# Patient Record
Sex: FEMALE | Race: WHITE | NOT HISPANIC OR LATINO | Employment: PART TIME | URBAN - METROPOLITAN AREA
[De-identification: names, ages, dates, MRNs, and addresses within clinical notes are randomized per-mention and may not be internally consistent; named-entity substitution may affect disease eponyms.]

---

## 2017-03-02 ENCOUNTER — GENERIC CONVERSION - ENCOUNTER (OUTPATIENT)
Dept: OTHER | Facility: OTHER | Age: 57
End: 2017-03-02

## 2017-08-07 ENCOUNTER — HOSPITAL ENCOUNTER (OUTPATIENT)
Dept: RADIOLOGY | Facility: HOSPITAL | Age: 57
Discharge: HOME | End: 2017-08-07
Payer: COMMERCIAL

## 2017-08-07 ENCOUNTER — TRANSCRIBE ORDERS (OUTPATIENT)
Dept: ADMINISTRATIVE | Facility: HOSPITAL | Age: 57
End: 2017-08-07

## 2017-08-07 ENCOUNTER — ALLSCRIPTS OFFICE VISIT (OUTPATIENT)
Dept: OTHER | Facility: OTHER | Age: 57
End: 2017-08-07

## 2017-08-07 DIAGNOSIS — M77.41 METATARSALGIA OF RIGHT FOOT: ICD-10-CM

## 2017-08-07 PROCEDURE — 73630 X-RAY EXAM OF FOOT: CPT

## 2017-08-09 ENCOUNTER — GENERIC CONVERSION - ENCOUNTER (OUTPATIENT)
Dept: OTHER | Facility: OTHER | Age: 57
End: 2017-08-09

## 2017-09-19 ENCOUNTER — TRANSCRIBE ORDERS (OUTPATIENT)
Dept: ADMINISTRATIVE | Facility: HOSPITAL | Age: 57
End: 2017-09-19

## 2017-09-19 DIAGNOSIS — Z12.31 ENCOUNTER FOR MAMMOGRAM TO ESTABLISH BASELINE MAMMOGRAM: Primary | ICD-10-CM

## 2017-09-30 ENCOUNTER — ALLSCRIPTS OFFICE VISIT (OUTPATIENT)
Dept: OTHER | Facility: OTHER | Age: 57
End: 2017-09-30

## 2017-10-02 ENCOUNTER — HOSPITAL ENCOUNTER (OUTPATIENT)
Dept: RADIOLOGY | Facility: HOSPITAL | Age: 57
Discharge: HOME | End: 2017-10-02
Payer: COMMERCIAL

## 2017-10-02 ENCOUNTER — TRANSCRIBE ORDERS (OUTPATIENT)
Dept: ADMINISTRATIVE | Facility: HOSPITAL | Age: 57
End: 2017-10-02

## 2017-10-02 DIAGNOSIS — J18.9 PNEUMONIA: ICD-10-CM

## 2017-10-02 DIAGNOSIS — Z12.31 ENCOUNTER FOR SCREENING MAMMOGRAM FOR MALIGNANT NEOPLASM OF BREAST: ICD-10-CM

## 2017-10-02 DIAGNOSIS — Z01.419 ENCOUNTER FOR GYNECOLOGICAL EXAMINATION WITHOUT ABNORMAL FINDING: ICD-10-CM

## 2017-10-02 DIAGNOSIS — J20.9 ACUTE BRONCHITIS: ICD-10-CM

## 2017-10-02 PROCEDURE — 71020 HB CHEST X-RAY 2VW FRONTAL&LATL: CPT

## 2017-10-05 ENCOUNTER — ALLSCRIPTS OFFICE VISIT (OUTPATIENT)
Dept: OTHER | Facility: OTHER | Age: 57
End: 2017-10-05

## 2017-10-06 NOTE — PROGRESS NOTES
Assessment  1  Community acquired pneumonia (5) (J18 9)   2  Current every day smoker (305 1) (F17 200)    Plan  Community acquired pneumonia    · * XR CHEST PA & LATERAL; Status:Active; Requested QVO:[id number];   SocHx: Current every day smoker    · You need to quit smoking ; Status:Complete;   Done: [id number] 02:17PM    Discussion/Summary    She is given a written prescription for Levaquin to have on hand  I have asked her to get a follow-up chest x-ray when she is done the antibiotics for 2 weeks  She is given and no to return to work on the 9th beginning with half days  She is strongly encouraged to begin a tobacco cessation program       Chief Complaint  pt here for recheck on pneumonia  She says she is feeling a little better  Advance Directives  Advance Directive  Luke:   The patient is not in agreement to receive the Advance Care Planning service-   NO - Patient does not have an advance health care directive  Summary of Advance Directive Conversation  Paperwork was given  History of Present Illness  HPI: Patient is here for pneumonia check  She was previously on azithromycin for a sinus infection and then had a choking episode  She then had copious amounts of sputum and developed malaise and fatigue  A chest x-ray demonstrated the presence of an infiltrate  She was switched to Bactrim DS  She is completing a course of steroids and has not required her inhaler  She is feeling much better but has some GI upset from the antibiotics      Review of Systems    Constitutional: No fever, no chills, feels well, no tiredness, no recent weight gain or loss  ENT: no ear ache, no loss of hearing, no nosebleeds or nasal discharge, no sore throat or hoarseness  Cardiovascular: no complaints of slow or fast heart rate, no chest pain, no palpitations, no leg claudication or lower extremity edema  Respiratory: shortness of breath-and-cough     Breasts: no complaints of breast pain, breast lump or nipple discharge  Gastrointestinal: no complaints of abdominal pain, no constipation, no nausea or diarrhea, no vomiting, no bloody stools  Genitourinary: no complaints of dysuria, no incontinence, no pelvic pain, no dysmenorrhea, no vaginal discharge or abnormal vaginal bleeding  Musculoskeletal: no complaints of arthralgia, no myalgia, no joint swelling or stiffness, no limb pain or swelling  Integumentary: no complaints of skin rash or lesion, no itching or dry skin, no skin wounds  Neurological: no complaints of headache, no confusion, no numbness or tingling, no dizziness or fainting  Active Problems  1  Abnormality, skin (757 9) (L98 9)   2  Alopecia (704 00) (L65 9)   3  Asthma (493 90) (J45 909)   4  Bronchitis with bronchospasm (490) (J20 9)   5  Cervical smear, as part of routine gynecological examination (V76 2) (Z01 419)   6  Chronic sinusitis (473 9) (J32 9)   7  Decreased body height (781 91) (R29 890)   8  History of allergy (V15 09) (Z88 9)   9  Hyperlipidemia (272 4) (E78 5)   10  Metatarsalgia of right foot (726 70) (M77 41)   11  History of Need for vaccine for TD (tetanus-diphtheria) (V06 5) (Z23)   12  Osteoarthritis of both hands (715 94) (M19 041,M19 042)   13  Osteoporosis (733 00) (M81 0)   14  Other chronic pain (338 29) (G89 29)   15  Visit for routine gyn exam (V72 31) (Z01 419)   16  Vitamin D deficiency (268 9) (E55 9)    Past Medical History  1  History of Breast self examination education, encounter for (V65 49) (Z71 89)   2  History of Colon cancer screening (V76 51) (Z12 11)   3  History of Encounter for pre-employment examination (V70 5) (Z02 1)   4  History of Encounter for screening for osteoporosis (V82 81) (Z13 820)   5  History of  2 (V22 2) (Z33 1)   6  History of temporomandibular joint disorder (V13 59) (Z87 39)   7  History of Need for influenza vaccination (V04 81) (Z23)   8  History of Need for vaccine for TD (tetanus-diphtheria) (V06 5) (Z23)   9   History of Nephrolithiasis (V13 01)   10  History of Screening for human papillomavirus (HPV) (V73 81) (Z11 51)   11  History of Sessile colonic polyp (211 3) (K63 5)   12  History of Vulvar abscess (616 4) (N76 4)  Active Problems And Past Medical History Reviewed: The active problems and past medical history were reviewed and updated today  Family History  Father    1  Family history of cerebrovascular accident (V17 1) (Z82 3)  Family History Reviewed: The family history was reviewed and updated today  Social History   · Being A Social Drinker   · Denied: History of Caffeine Use   · Current every day smoker (305 1) (F17 200)   · Marital History - Currently    · [address]   · Two children   · Uses Safety Equipment - Seatbelts  The social history was reviewed and updated today  Surgical History  1  History of Appendectomy   2  History of Colonoscopy (Fiberoptic) Screening   3  History of Colposcopy Cervix With Biopsy(S) With Endocervical Curettage   4  History of Jaw Surgery  Surgical History Reviewed: The surgical history was reviewed and updated today  Current Meds   1  Aspirin 325 MG Oral Tablet; Therapy: 89Yip9073 to Recorded   2  Azithromycin 250 MG Oral Tablet; TAKE 2 TABLETS ON DAY 1 THEN TAKE 1 TABLET A   DAY FOR 4 DAYS; Therapy: 77LLH1936 to (Gato Estrada)  Requested for: 71RUP7739; Last   Rx:[id number] Ordered   3  Bactrim -160 MG Oral Tablet; TAKE 1 TABLET TWICE DAILY WITH FOOD; Therapy: 97OHX5418 to (Gato Estrada)  Requested for: 07ZXK3511; Last   Rx:[id number] Ordered   4  Multivitamins Oral Capsule; Therapy: () to Recorded   5  ProAir  (90 Base) MCG/ACT Inhalation Aerosol Solution; INHALE 2 PUFFS   EVERY 4 HOURS AS NEEDED; Therapy: 48RUU0314 to (Last Rx:23Mvj7732)  Requested for: 24MIB9358 Ordered   6  Tums 500 MG Oral Tablet Chewable; Therapy: () to Recorded   7   Vitamin D3 2000 UNIT Oral Tablet Chewable; Therapy: ([phone]) to Recorded    The medication list was reviewed and updated today  Allergies  1  Augmentin XR TB12   2  Avelox TABS   3  Ceclor CAPS   4  Keflex CAPS   5  Doxycycline Monohydrate CAPS   6  Egg/Pro LIQD   7  Vicodin TABS    Vitals   Recorded: 50IYC0706 01:53PM   Temperature 99 2 F   Heart Rate 83   Systolic 546   Diastolic 54   Height 5 ft 0 5 in   Weight 96 lb    BMI Calculated 18 44   BSA Calculated 1 37   O2 Saturation 93     Physical Exam    Constitutional   General appearance: No acute distress, well appearing and well nourished  Eyes   Conjunctiva and lids: No swelling, erythema or discharge  Pupils and irises: Equal, round and reactive to light  Ears, Nose, Mouth, and Throat   External inspection of ears and nose: Normal     Otoscopic examination: Tympanic membranes translucent with normal light reflex  Canals patent without erythema  Nasal mucosa, septum, and turbinates: Normal without edema or erythema  Oropharynx: Normal with no erythema, edema, exudate or lesions  Pulmonary   Respiratory effort: No increased work of breathing or signs of respiratory distress  Auscultation of lungs: Abnormal  -Course breath sounds bilaterally  Cardiovascular   Auscultation of heart: Normal rate and rhythm, normal S1 and S2, without murmurs  Examination of extremities for edema and/or varicosities: Normal     Carotid pulses: Normal     Abdomen   Abdomen: Non-tender, no masses  Liver and spleen: No hepatomegaly or splenomegaly  Lymphatic   Palpation of lymph nodes in neck: No lymphadenopathy  Musculoskeletal   Gait and station: Normal     Digits and nails: Normal without clubbing or cyanosis  Inspection/palpation of joints, bones, and muscles: Normal     Skin   Skin and subcutaneous tissue: Normal without rashes or lesions  Neurologic   Cranial nerves: Cranial nerves 2-12 intact  Reflexes: 2+ and symmetric  Sensation: No sensory loss      Psychiatric   Orientation to person, place, and time: Normal     Mood and affect: Normal          Future Appointments    Date/Time Provider Specialty Site   10/10/2017 02:00 PM Luiz Ramos DO Obstetrics/Gynecology [facility]     Signatures   Electronically signed by : Rj Cleary DO; Oct  5 2017  2:17PM EST                       (Author)

## 2017-10-10 ENCOUNTER — ALLSCRIPTS OFFICE VISIT (OUTPATIENT)
Dept: OTHER | Facility: OTHER | Age: 57
End: 2017-10-10

## 2017-10-18 LAB
ADEQUACY: (HISTORICAL): NORMAL
CLINICIAN PROVIDIED ICD 9 OR 10 (HISTORICAL): NORMAL
COMMENT (HISTORICAL): NORMAL
DIAGNOSIS (HISTORICAL): NORMAL
HPV HIGH RISK (HISTORICAL): NEGATIVE
NOTE: (HISTORICAL): NORMAL
PERFORMED BY (HISTORICAL): NORMAL
TEST INFORMATION (HISTORICAL): NORMAL

## 2017-10-24 ENCOUNTER — HOSPITAL ENCOUNTER (OUTPATIENT)
Dept: MAMMOGRAPHY | Facility: MEDICAL CENTER | Age: 57
Discharge: HOME | End: 2017-10-24
Payer: COMMERCIAL

## 2017-10-24 DIAGNOSIS — Z12.31 ENCOUNTER FOR SCREENING MAMMOGRAM FOR MALIGNANT NEOPLASM OF BREAST: ICD-10-CM

## 2017-10-24 DIAGNOSIS — Z01.419 ENCOUNTER FOR GYNECOLOGICAL EXAMINATION WITHOUT ABNORMAL FINDING: ICD-10-CM

## 2017-10-24 PROCEDURE — G0202 SCR MAMMO BI INCL CAD: HCPCS

## 2017-10-24 PROCEDURE — 77063 BREAST TOMOSYNTHESIS BI: CPT

## 2017-10-26 ENCOUNTER — HOSPITAL ENCOUNTER (OUTPATIENT)
Dept: RADIOLOGY | Facility: HOSPITAL | Age: 57
Discharge: HOME | End: 2017-10-26
Payer: COMMERCIAL

## 2017-10-26 DIAGNOSIS — J18.9 PNEUMONIA: ICD-10-CM

## 2017-10-26 PROCEDURE — 71020 HB CHEST X-RAY 2VW FRONTAL&LATL: CPT

## 2017-10-27 NOTE — PROGRESS NOTES
Assessment  1  Bronchitis with bronchospasm (490) (J20 9)   2  Current every day smoker (305 1) (F17 200)    Plan  Bronchitis with bronchospasm    · ProAir  (90 Base) MCG/ACT Inhalation Aerosol Solution; INHALE 2 PUFFS  EVERY 4 HOURS AS NEEDED   · From  Sulfamethoxazole-Trimethoprim 800-160 MG Oral Tablet Take 1 tablet  twice daily To Bactrim -160 MG Oral Tablet (Sulfamethoxazole-Trimethoprim)  Take 1 tablet twice daily    Discussion/Summary    1) change to bactrim 1 tab twice a day, stop zithromax: note multiple drug allergiesprednisone 10mg 4 tabs for 2 days, 3 tabs for 2 days, 2 tabs for 2 days, 1 tabs for 2 daysproair 2 puffs every 4 hours as needed  Possible side effects of new medications were reviewed with the patient/guardian today  The treatment plan was reviewed with the patient/guardian  The patient/guardian understands and agrees with the treatment plan      Chief Complaint  Pt here with chest congestion, yellowish green mucus that comes and goes  Dr Bo Toney sent in Azithromycin (today would be day #4) but she just feels worse  History of Present Illness  HPI: symptoms started few weeks ago  started with sore throat, went right to chest  coughing with production, thick mucous, yellow and green , feels feverish  in for zithromax for sinus infection and symptoms are not improving  were sick      Review of Systems    Constitutional: feeling tired, but-- as noted in HPI-- and-- no chills  ENT: earache,-- sore throat-- and-- nasal discharge, but-- as noted in HPI,-- no nosebleeds,-- no hearing loss-- and-- no hoarseness  Cardiovascular: no complaints of slow or fast heart rate, no chest pain, no palpitations, no leg claudication or lower extremity edema  Respiratory: shortness of breath,-- cough,-- wheezing-- and-- PND, but-- as noted in HPI,-- no orthopnea-- and-- no shortness of breath during exertion  Breasts: no complaints of breast pain, breast lump or nipple discharge  Gastrointestinal: no complaints of abdominal pain, no constipation, no nausea or diarrhea, no vomiting, no bloody stools  Genitourinary: no complaints of dysuria, no incontinence, no pelvic pain, no dysmenorrhea, no vaginal discharge or abnormal vaginal bleeding  Musculoskeletal: no complaints of arthralgia, no myalgia, no joint swelling or stiffness, no limb pain or swelling  Integumentary: no complaints of skin rash or lesion, no itching or dry skin, no skin wounds  Neurological: headache, but-- as noted in HPI  Active Problems  1  Abnormality, skin (757 9) (L98 9)   2  Alopecia (704 00) (L65 9)   3  Asthma (493 90) (J45 909)   4  Cervical smear, as part of routine gynecological examination (V76 2) (Z01 419)   5  Chronic sinusitis (473 9) (J32 9)   6  Decreased body height (781 91) (R29 890)   7  History of allergy (V15 09) (Z88 9)   8  Hyperlipidemia (272 4) (E78 5)   9  Metatarsalgia of right foot (726 70) (M77 41)   10  History of Need for vaccine for TD (tetanus-diphtheria) (V06 5) (Z23)   11  Osteoarthritis of both hands (715 94) (M19 041,M19 042)   12  Osteoporosis (733 00) (M81 0)   13  Other chronic pain (338 29) (G89 29)   14  Visit for routine gyn exam (V72 31) (Z01 419)   15  Vitamin D deficiency (268 9) (E55 9)    Past Medical History  1  History of Breast self examination education, encounter for (V65 49) (Z71 89)   2  History of Colon cancer screening (V76 51) (Z12 11)   3  History of Encounter for pre-employment examination (V70 5) (Z02 1)   4  History of Encounter for screening for osteoporosis (V82 81) (Z13 820)   5  History of  2 (V22 2) (Z33 1)   6  History of temporomandibular joint disorder (V13 59) (Z87 39)   7  History of Need for influenza vaccination (V04 81) (Z23)   8  History of Need for vaccine for TD (tetanus-diphtheria) (V06 5) (Z23)   9  History of Nephrolithiasis (V13 01)   10  History of Screening for human papillomavirus (HPV) (V73 81) (Z11 51)   11   History of Sessile colonic polyp (211 3) (K63 5)   12  History of Vulvar abscess (616 4) (N76 4)  Active Problems And Past Medical History Reviewed: The active problems and past medical history were reviewed and updated today  Family History  Father    1  Family history of cerebrovascular accident (V17 1) (Z82 3)  Family History Reviewed: The family history was reviewed and updated today  Social History   · Being A Social Drinker   · Denied: History of Caffeine Use   · Current every day smoker (305 1) (F17 200)   · Marital History - Currently    · [address]   · Two children   · Uses Safety Equipment - Seatbelts  The social history was reviewed and updated today  The social history was reviewed and is unchanged  Surgical History  1  History of Appendectomy   2  History of Colonoscopy (Fiberoptic) Screening   3  History of Colposcopy Cervix With Biopsy(S) With Endocervical Curettage   4  History of Jaw Surgery  Surgical History Reviewed: The surgical history was reviewed and updated today  Current Meds   1  Aspirin 325 MG Oral Tablet; Therapy: 50Han5509 to Recorded   2  Azithromycin 250 MG Oral Tablet; TAKE 2 TABLETS ON DAY 1 THEN TAKE 1 TABLET A   DAY FOR 4 DAYS; Therapy: 44BRG9937 to (Chato Jones)  Requested for: 21HZB2605; Last   Rx:[id number] Ordered   3  Multivitamins Oral Capsule; Therapy: () to Recorded   4  Tums 500 MG Oral Tablet Chewable; Therapy: () to Recorded   5  Vitamin D3 2000 UNIT Oral Tablet Chewable; Therapy: () to Recorded    The medication list was reviewed and updated today  Allergies  1  Augmentin XR TB12   2  Avelox TABS   3  Ceclor CAPS   4  Keflex CAPS   5  Doxycycline Monohydrate CAPS   6  Egg/Pro LIQD   7   Vicodin TABS    Vitals   Recorded: 73DUH4894 09:20AM   Temperature 99 7 F   Heart Rate 85   Systolic 587   Diastolic 76   Height 5 ft 0 5 in   Weight 97 lb

## 2017-10-30 ENCOUNTER — GENERIC CONVERSION - ENCOUNTER (OUTPATIENT)
Dept: OTHER | Facility: OTHER | Age: 57
End: 2017-10-30

## 2018-01-13 VITALS
BODY MASS INDEX: 17.65 KG/M2 | DIASTOLIC BLOOD PRESSURE: 62 MMHG | HEIGHT: 61 IN | WEIGHT: 93.5 LBS | SYSTOLIC BLOOD PRESSURE: 114 MMHG

## 2018-01-13 VITALS
SYSTOLIC BLOOD PRESSURE: 116 MMHG | TEMPERATURE: 98 F | WEIGHT: 96 LBS | HEIGHT: 61 IN | DIASTOLIC BLOOD PRESSURE: 72 MMHG | HEART RATE: 68 BPM | OXYGEN SATURATION: 96 % | BODY MASS INDEX: 18.12 KG/M2

## 2018-01-14 VITALS
HEART RATE: 85 BPM | BODY MASS INDEX: 18.31 KG/M2 | TEMPERATURE: 99.7 F | WEIGHT: 97 LBS | SYSTOLIC BLOOD PRESSURE: 122 MMHG | DIASTOLIC BLOOD PRESSURE: 76 MMHG | HEIGHT: 61 IN | OXYGEN SATURATION: 94 %

## 2018-01-15 VITALS
SYSTOLIC BLOOD PRESSURE: 102 MMHG | TEMPERATURE: 99.2 F | HEART RATE: 83 BPM | BODY MASS INDEX: 18.12 KG/M2 | OXYGEN SATURATION: 93 % | WEIGHT: 96 LBS | HEIGHT: 61 IN | DIASTOLIC BLOOD PRESSURE: 54 MMHG

## 2018-01-16 NOTE — RESULT NOTES
Discussion/Summary   chest x-ray is now normal      Verified Results  * XR CHEST PA & LATERAL 26Oct2017 01:45PM Stefanie Bolivar Order Number: [id number]     Test Name Result Flag Reference   XR CHEST PA & LATERAL (Report)     CHEST - DUAL ENERGY     INDICATION: Follow-up right middle lobe pneumonia  COMPARISON: 10/2/2017 at 12:06 AM      VIEWS: PA (including soft tissue/bone algorithms) and lateral projections     IMAGES: 4     FINDINGS:        Cardiomediastinal silhouette appears unremarkable  There is improved aeration within the right middle lobe  No new focal consolidations, pleural effusions, or pneumothorax  Visualized osseous structures appear within normal limits for the patient's age  IMPRESSION:     Improved aeration within the right middle lobe  No focal consolidations, pleural effusions, or pneumothorax         Workstation performed: [id number]     Signed by:   Mark Perdue MD   10/30/17

## 2018-01-16 NOTE — RESULT NOTES
Discussion/Summary   xray is negative for fracture     Verified Results  * XR FOOT 3+ VIEW RIGHT 05Rrh2547 10:36AM Stefanie Bolivar Order Number: [id number]     Test Name Result Flag Reference   XR FOOT 3+ VW RIGHT (Report)     RIGHT FOOT     INDICATION: Right foot pain  COMPARISON: None     VIEWS: 3     IMAGES: 3     FINDINGS:     There is no acute fracture or dislocation  Bipartite tibial sesamoid noted  No degenerative changes  No lytic or blastic lesions are seen  Soft tissues are unremarkable  IMPRESSION:     No acute osseous abnormality         Workstation performed: [id number]     Signed by:   Collin Doll MD   8/9/17

## 2018-01-25 ENCOUNTER — TELEPHONE (OUTPATIENT)
Dept: FAMILY MEDICINE CLINIC | Facility: CLINIC | Age: 58
End: 2018-01-25

## 2018-01-25 DIAGNOSIS — J01.01 ACUTE RECURRENT MAXILLARY SINUSITIS: ICD-10-CM

## 2018-01-25 DIAGNOSIS — J01.01 ACUTE RECURRENT MAXILLARY SINUSITIS: Primary | ICD-10-CM

## 2018-01-25 RX ORDER — AZITHROMYCIN 250 MG/1
250 TABLET, FILM COATED ORAL DAILY
Qty: 6 TABLET | Refills: 0 | Status: SHIPPED | OUTPATIENT
Start: 2018-01-25 | End: 2018-01-25 | Stop reason: SDUPTHER

## 2018-01-26 RX ORDER — AZITHROMYCIN 250 MG/1
TABLET, FILM COATED ORAL
Qty: 6 TABLET | Refills: 0 | Status: SHIPPED | OUTPATIENT
Start: 2018-01-26 | End: 2018-01-31

## 2018-02-10 ENCOUNTER — OFFICE VISIT (OUTPATIENT)
Dept: FAMILY MEDICINE CLINIC | Facility: CLINIC | Age: 58
End: 2018-02-10
Payer: COMMERCIAL

## 2018-02-10 VITALS
DIASTOLIC BLOOD PRESSURE: 72 MMHG | HEIGHT: 64 IN | SYSTOLIC BLOOD PRESSURE: 128 MMHG | TEMPERATURE: 99 F | HEART RATE: 72 BPM | BODY MASS INDEX: 17.54 KG/M2 | WEIGHT: 102.75 LBS | OXYGEN SATURATION: 98 %

## 2018-02-10 DIAGNOSIS — J32.0 CHRONIC MAXILLARY SINUSITIS: Primary | ICD-10-CM

## 2018-02-10 PROCEDURE — 99213 OFFICE O/P EST LOW 20 MIN: CPT | Performed by: FAMILY MEDICINE

## 2018-02-10 RX ORDER — PREDNISONE 10 MG/1
TABLET ORAL
Qty: 30 TABLET | Refills: 0 | Status: SHIPPED | COMMUNITY
Start: 2018-02-10 | End: 2018-04-09

## 2018-02-10 RX ORDER — ASPIRIN 325 MG
325 TABLET ORAL DAILY
COMMUNITY

## 2018-02-10 RX ORDER — DOXYCYCLINE 100 MG/1
100 CAPSULE ORAL 2 TIMES DAILY
Qty: 20 CAPSULE | Refills: 0 | Status: SHIPPED | OUTPATIENT
Start: 2018-02-10 | End: 2018-02-20

## 2018-02-10 RX ORDER — MULTIVITAMIN
1 TABLET ORAL DAILY
COMMUNITY

## 2018-02-10 NOTE — PATIENT INSTRUCTIONS
Rhinosinusitis   WHAT YOU NEED TO KNOW:   Rhinosinusitis (RS) is inflammation of your nose and sinuses  It commonly begins as a virus, often as a common cold  Viruses usually last 7 to 10 days and do not need treatment  When the virus does not get better on its own, you may have bacterial RS  This means that bacteria have begun to grow inside your sinuses  Acute RS lasts less than 4 weeks  Chronic RS lasts 12 weeks or more  Recurrent RS is when you have 4 or more episodes of RS in one year  DISCHARGE INSTRUCTIONS:   Return to the emergency department if:   · Your eye and eyelid are red, swollen, and painful  · You cannot open your eye  · You have double vision or you cannot see  · Your eyeball bulges out or you cannot move your eye  · You are more sleepy than normal or you notice changes in your ability to think, move, or talk  · You have a stiff neck, a fever, or a bad headache  · You have swelling of your forehead or scalp  Contact your healthcare provider if:   · Your symptoms are worse or do not improve after 3 to 5 days of treatment  · You have questions or concerns about your condition or care  Medicines: You may need any of the following:  · Acetaminophen  decreases pain and fever  It is available without a doctor's order  Ask how much to take and how often to take it  Follow directions  Acetaminophen can cause liver damage if not taken correctly  · NSAIDs , such as ibuprofen, help decrease swelling, pain, and fever  This medicine is available with or without a doctor's order  NSAIDs can cause stomach bleeding or kidney problems in certain people  If you take blood thinner medicine, always ask your healthcare provider if NSAIDs are safe for you  Always read the medicine label and follow directions  · Nasal steroid sprays  decrease inflammation in your nose and sinuses  · Decongestants  reduce swelling and drain mucus in the nose and sinuses   They may help you breathe easier  · Antihistamines  dry mucus in the nose and relieve sneezing  · Antibiotics  treat a bacterial infection and may be needed if your symptoms do not improve or they get worse  · Take your medicine as directed  Contact your healthcare provider if you think your medicine is not helping or if you have side effects  Tell him or her if you are allergic to any medicine  Keep a list of the medicines, vitamins, and herbs you take  Include the amounts, and when and why you take them  Bring the list or the pill bottles to follow-up visits  Carry your medicine list with you in case of an emergency  Self-care:   · Rinse your sinuses  Use a sinus rinse device to rinse your nasal passages with a saline (salt water) solution  This will help thin the mucus in your nose and rinse away pollen and dirt  It will also help reduce swelling so you can breathe normally  Ask your healthcare provider how often to do this  · Breathe in steam   Heat a bowl of water until you see steam  Lean over the bowl and make a tent over your head with a large towel  Breathe deeply for about 20 minutes  Be careful not to get too close to the steam or burn yourself  Do this 3 times a day  You can also breathe deeply when you take a hot shower  · Sleep with your head elevated  Place an extra pillow under your head before you go to sleep to help your sinuses drain  · Drink liquids as directed  Ask your healthcare provider how much liquid to drink each day and which liquids are best for you  Liquids will thin the mucus in your nose and help it drain  Avoid drinks that contain alcohol or caffeine  · Do not smoke, and avoid secondhand smoke  Nicotine and other chemicals in cigarettes and cigars can make your symptoms worse  Ask your healthcare provider for information if you currently smoke and need help to quit  E-cigarettes or smokeless tobacco still contain nicotine   Talk to your healthcare provider before you use these products  Follow up with your healthcare provider as directed: Follow up if your symptoms are worse or not better after 3 to 5 days of treatment  Write down your questions so you remember to ask them during your visits  © 2017 [employer] Information is for End User's use only and may not be sold, redistributed or otherwise used for commercial purposes  All illustrations and images included in CareNotes® are the copyrighted property of A D [employer] M [employer] Inc  or [employer]  The above information is an  only  It is not intended as medical advice for individual conditions or treatments  Talk to your doctor, nurse or pharmacist before following any medical regimen to see if it is safe and effective for you

## 2018-02-15 ENCOUNTER — TELEPHONE (OUTPATIENT)
Dept: FAMILY MEDICINE CLINIC | Facility: CLINIC | Age: 58
End: 2018-02-15

## 2018-02-15 ENCOUNTER — TRANSCRIBE ORDERS (OUTPATIENT)
Dept: FAMILY MEDICINE CLINIC | Facility: CLINIC | Age: 58
End: 2018-02-15

## 2018-02-15 NOTE — TELEPHONE ENCOUNTER
Seen Saturday for ear infection  Still feels like there is fluid in her ear and it's already franky five days  She still has medication to finish but wants to know if there an ENT you can refer her to the one previously mentioned at her appt does not take her insurance

## 2018-02-16 ENCOUNTER — HOSPITAL ENCOUNTER (OUTPATIENT)
Dept: RADIOLOGY | Facility: HOSPITAL | Age: 58
Discharge: HOME | End: 2018-02-16
Attending: FAMILY MEDICINE
Payer: COMMERCIAL

## 2018-02-16 ENCOUNTER — TELEPHONE (OUTPATIENT)
Dept: FAMILY MEDICINE CLINIC | Facility: CLINIC | Age: 58
End: 2018-02-16

## 2018-02-16 DIAGNOSIS — J34.89 SINUS PAIN: ICD-10-CM

## 2018-02-16 DIAGNOSIS — H92.09 OTALGIA, UNSPECIFIED LATERALITY: ICD-10-CM

## 2018-02-16 DIAGNOSIS — H92.09 OTALGIA, UNSPECIFIED LATERALITY: Primary | ICD-10-CM

## 2018-02-16 PROCEDURE — 70220 X-RAY EXAM OF SINUSES: CPT

## 2018-02-16 NOTE — TELEPHONE ENCOUNTER
WILL YOU PLEASE ORDER A SINUS X-RAY, PLEASE SEE PREVIOUS PHONE MESSAGE FOR ALEX    I NEED TO LET PT KNOW AS SOON A IT IS ORDERED

## 2018-02-19 DIAGNOSIS — J01.01 ACUTE RECURRENT MAXILLARY SINUSITIS: Primary | ICD-10-CM

## 2018-04-09 ENCOUNTER — OFFICE VISIT (OUTPATIENT)
Dept: FAMILY MEDICINE CLINIC | Facility: CLINIC | Age: 58
End: 2018-04-09
Payer: COMMERCIAL

## 2018-04-09 VITALS
HEIGHT: 64 IN | RESPIRATION RATE: 12 BRPM | WEIGHT: 99.25 LBS | SYSTOLIC BLOOD PRESSURE: 102 MMHG | DIASTOLIC BLOOD PRESSURE: 70 MMHG | HEART RATE: 73 BPM | BODY MASS INDEX: 16.94 KG/M2 | OXYGEN SATURATION: 97 % | TEMPERATURE: 98.4 F

## 2018-04-09 DIAGNOSIS — J32.8 OTHER CHRONIC SINUSITIS: ICD-10-CM

## 2018-04-09 DIAGNOSIS — J01.01 ACUTE RECURRENT MAXILLARY SINUSITIS: Primary | ICD-10-CM

## 2018-04-09 PROBLEM — M77.41 METATARSALGIA OF RIGHT FOOT: Status: ACTIVE | Noted: 2017-08-07

## 2018-04-09 PROCEDURE — 99214 OFFICE O/P EST MOD 30 MIN: CPT | Performed by: FAMILY MEDICINE

## 2018-04-09 RX ORDER — DOXYCYCLINE HYCLATE 100 MG/1
100 CAPSULE ORAL EVERY 12 HOURS SCHEDULED
Qty: 20 CAPSULE | Refills: 0 | Status: SHIPPED | OUTPATIENT
Start: 2018-04-09 | End: 2022-05-19 | Stop reason: SDUPTHER

## 2018-04-09 RX ORDER — MELATONIN
1000 DAILY
COMMUNITY

## 2018-04-09 RX ORDER — PREDNISONE 10 MG/1
TABLET ORAL
Qty: 20 TABLET | Refills: 0
Start: 2018-04-09 | End: 2018-05-24 | Stop reason: SDUPTHER

## 2018-04-09 NOTE — PROGRESS NOTES
Assessment/Plan:    No problem-specific Assessment & Plan notes found for this encounter  Diagnoses and all orders for this visit:    Acute recurrent maxillary sinusitis  Other chronic sinusitis  -     doxycycline hyclate (VIBRAMYCIN) 100 mg capsule; Take 1 capsule (100 mg total) by mouth every 12 (twelve) hours for 10 days  -     predniSONE 10 mg tablet; 4 tabs daily x2 days, 3 tabs daily x2 days, 2 tabs daily x 2 days, 1 tab daily x2 days  -     XR chest pa & lateral; Future    I suspect that the patient has a bacterial sinusitis  I prescribed antibiotics and encouraged medication for sx relief  Rest and fluids encouraged as well  Given her chronic sinusitis she does often get a steroid when she has acute sinusitis and she does feel she needs this  Additionally, she had pneumonia last October and she is very concerned about developing pneumonia again  Her lungs sound clear today but I did prescribe an x-ray for her-if she is getting worse, especially if she develops fevers and shortness of breath, she can go over to the hospital and get the x-ray done  Subjective:      Patient ID: Tari Rowe is a 60 y o  female  The pt is here because she has been sick for 5 days  + sinus pain/pressure  + ear pain and pressure  + cough  + nasal congestion - has dark green phlegm   + headaches  + PND  + sore throat  No fevers    Took a bactrim this morning          The following portions of the patient's history were reviewed and updated as appropriate: allergies, current medications, past family history, past medical history, past social history, past surgical history and problem list     Review of Systems      Objective:  Vitals:    04/09/18 0939   BP: 102/70   Pulse: 73   Resp: 12   Temp: 98 4 °F (36 9 °C)   SpO2: 97%   Weight: 45 kg (99 lb 4 oz)   Height: 5' 4" (1 626 m)      Physical Exam   Constitutional: She is oriented to person, place, and time   Vital signs are normal  She appears well-developed and well-nourished  She appears ill  HENT:   Head: Normocephalic and atraumatic  Right Ear: External ear normal  Tympanic membrane is bulging  Tympanic membrane is not erythematous  Left Ear: External ear normal  Tympanic membrane is bulging  Tympanic membrane is not erythematous  Nose: Mucosal edema and sinus tenderness present  No rhinorrhea  Right sinus exhibits maxillary sinus tenderness  Right sinus exhibits no frontal sinus tenderness  Left sinus exhibits maxillary sinus tenderness  Left sinus exhibits no frontal sinus tenderness  Mouth/Throat: Mucous membranes are normal  Posterior oropharyngeal erythema present  No oropharyngeal exudate, posterior oropharyngeal edema or tonsillar abscesses  Eyes: Conjunctivae and lids are normal    Pulmonary/Chest: Effort normal and breath sounds normal    Lymphadenopathy:     She has cervical adenopathy  Neurological: She is alert and oriented to person, place, and time  Skin: Skin is warm, dry and intact  Psychiatric: She has a normal mood and affect   Thought content normal

## 2018-05-24 ENCOUNTER — OFFICE VISIT (OUTPATIENT)
Dept: FAMILY MEDICINE CLINIC | Facility: CLINIC | Age: 58
End: 2018-05-24
Payer: COMMERCIAL

## 2018-05-24 VITALS
HEIGHT: 64 IN | HEART RATE: 86 BPM | DIASTOLIC BLOOD PRESSURE: 62 MMHG | OXYGEN SATURATION: 97 % | WEIGHT: 98 LBS | TEMPERATURE: 98.8 F | SYSTOLIC BLOOD PRESSURE: 114 MMHG | BODY MASS INDEX: 16.73 KG/M2

## 2018-05-24 DIAGNOSIS — J32.8 OTHER CHRONIC SINUSITIS: ICD-10-CM

## 2018-05-24 DIAGNOSIS — J01.01 ACUTE RECURRENT MAXILLARY SINUSITIS: ICD-10-CM

## 2018-05-24 DIAGNOSIS — J01.00 ACUTE NON-RECURRENT MAXILLARY SINUSITIS: Primary | ICD-10-CM

## 2018-05-24 PROCEDURE — 3008F BODY MASS INDEX DOCD: CPT | Performed by: FAMILY MEDICINE

## 2018-05-24 PROCEDURE — 99214 OFFICE O/P EST MOD 30 MIN: CPT | Performed by: FAMILY MEDICINE

## 2018-05-24 RX ORDER — DOXYCYCLINE HYCLATE 100 MG/1
100 CAPSULE ORAL EVERY 12 HOURS SCHEDULED
Qty: 20 CAPSULE | Refills: 0 | Status: SHIPPED | OUTPATIENT
Start: 2018-05-24 | End: 2018-06-03

## 2018-05-24 RX ORDER — PREDNISONE 10 MG/1
TABLET ORAL
Qty: 20 TABLET | Refills: 0
Start: 2018-05-24 | End: 2018-10-02

## 2018-05-24 NOTE — PROGRESS NOTES
Assessment/Plan:    No problem-specific Assessment & Plan notes found for this encounter  Diagnoses and all orders for this visit:    Acute non-recurrent maxillary sinusitis  -     doxycycline hyclate (VIBRAMYCIN) 100 mg capsule; Take 1 capsule (100 mg total) by mouth every 12 (twelve) hours for 10 days    Acute recurrent maxillary sinusitis  -     predniSONE 10 mg tablet; 4 tabs daily x2 days, 3 tabs daily x2 days, 2 tabs daily x 2 days, 1 tab daily x2 days    Other chronic sinusitis  -     predniSONE 10 mg tablet; 4 tabs daily x2 days, 3 tabs daily x2 days, 2 tabs daily x 2 days, 1 tab daily x2 days          I suspect that the patient may have a bacterial sinusitis  I prescribed antibiotics and encouraged medication for sx relief  Rest and fluids encouraged as well  I did also prescribe a steroid  She is actually not going to take anything as of yet-she feels like it might be getting better but with the holiday weekend coming up she would prefer to have something on hand  She was just here in April with similar symptoms and was treated with an antibiotic and a steroid at that time  If she does not abusing the medication and this recurs again it would be worth a visit to the ENT  She is using her Rhinocort and I encouraged she continue this, she can also get nasal saline  Subjective:      Patient ID: Amando Barton is a 60 y o  female      The pt is here because she has been sick for 7 days, at least  + sinus pain/pressure  + ear pain and pressure - they feel full  Changes one side to the other, swollen glands  + cough, a lot in the am  + nasal congestion  + headaches  + PND  + sore throat - comes and goes   Green mucous  No fevers    She did have an x-ray of her sinuses in February of 2018  The report showed no sinusitis but a history of sinus surgery-the patient reports she has never had sinus surgery        The following portions of the patient's history were reviewed and updated as appropriate: allergies, current medications, past family history, past medical history, past social history, past surgical history and problem list     Review of Systems      Objective:  Vitals:    05/24/18 0918   BP: 114/62   Pulse: 86   Temp: 98 8 °F (37 1 °C)   SpO2: 97%   Weight: 44 5 kg (98 lb)   Height: 5' 4" (1 626 m)      Physical Exam   Constitutional: She is oriented to person, place, and time  Vital signs are normal  She appears well-developed and well-nourished  She appears ill  HENT:   Head: Normocephalic and atraumatic  Right Ear: Tympanic membrane and external ear normal    Left Ear: Tympanic membrane and external ear normal    Nose: Mucosal edema and sinus tenderness present  No rhinorrhea  Right sinus exhibits maxillary sinus tenderness and frontal sinus tenderness  Left sinus exhibits maxillary sinus tenderness and frontal sinus tenderness  Mouth/Throat: Mucous membranes are normal  Posterior oropharyngeal erythema present  No oropharyngeal exudate, posterior oropharyngeal edema or tonsillar abscesses  Eyes: Conjunctivae and lids are normal    Pulmonary/Chest: Effort normal and breath sounds normal    Lymphadenopathy:     She has cervical adenopathy  Neurological: She is alert and oriented to person, place, and time  Skin: Skin is warm, dry and intact  Psychiatric: She has a normal mood and affect   Thought content normal

## 2018-05-24 NOTE — PATIENT INSTRUCTIONS
Rhinosinusitis   WHAT YOU NEED TO KNOW:   Rhinosinusitis (RS) is inflammation of your nose and sinuses  It commonly begins as a virus, often as a common cold  Viruses usually last 7 to 10 days and do not need treatment  When the virus does not get better on its own, you may have bacterial RS  This means that bacteria have begun to grow inside your sinuses  Acute RS lasts less than 4 weeks  Chronic RS lasts 12 weeks or more  Recurrent RS is when you have 4 or more episodes of RS in one year  DISCHARGE INSTRUCTIONS:   Return to the emergency department if:   · Your eye and eyelid are red, swollen, and painful  · You cannot open your eye  · You have double vision or you cannot see  · Your eyeball bulges out or you cannot move your eye  · You are more sleepy than normal or you notice changes in your ability to think, move, or talk  · You have a stiff neck, a fever, or a bad headache  · You have swelling of your forehead or scalp  Contact your healthcare provider if:   · Your symptoms are worse or do not improve after 3 to 5 days of treatment  · You have questions or concerns about your condition or care  Medicines: You may need any of the following:  · Acetaminophen  decreases pain and fever  It is available without a doctor's order  Ask how much to take and how often to take it  Follow directions  Acetaminophen can cause liver damage if not taken correctly  · NSAIDs , such as ibuprofen, help decrease swelling, pain, and fever  This medicine is available with or without a doctor's order  NSAIDs can cause stomach bleeding or kidney problems in certain people  If you take blood thinner medicine, always ask your healthcare provider if NSAIDs are safe for you  Always read the medicine label and follow directions  · Nasal steroid sprays  decrease inflammation in your nose and sinuses  · Decongestants  reduce swelling and drain mucus in the nose and sinuses   They may help you breathe easier  · Antihistamines  dry mucus in the nose and relieve sneezing  · Antibiotics  treat a bacterial infection and may be needed if your symptoms do not improve or they get worse  · Take your medicine as directed  Contact your healthcare provider if you think your medicine is not helping or if you have side effects  Tell him or her if you are allergic to any medicine  Keep a list of the medicines, vitamins, and herbs you take  Include the amounts, and when and why you take them  Bring the list or the pill bottles to follow-up visits  Carry your medicine list with you in case of an emergency  Self-care:   · Rinse your sinuses  Use a sinus rinse device to rinse your nasal passages with a saline (salt water) solution  This will help thin the mucus in your nose and rinse away pollen and dirt  It will also help reduce swelling so you can breathe normally  Ask your healthcare provider how often to do this  · Breathe in steam   Heat a bowl of water until you see steam  Lean over the bowl and make a tent over your head with a large towel  Breathe deeply for about 20 minutes  Be careful not to get too close to the steam or burn yourself  Do this 3 times a day  You can also breathe deeply when you take a hot shower  · Sleep with your head elevated  Place an extra pillow under your head before you go to sleep to help your sinuses drain  · Drink liquids as directed  Ask your healthcare provider how much liquid to drink each day and which liquids are best for you  Liquids will thin the mucus in your nose and help it drain  Avoid drinks that contain alcohol or caffeine  · Do not smoke, and avoid secondhand smoke  Nicotine and other chemicals in cigarettes and cigars can make your symptoms worse  Ask your healthcare provider for information if you currently smoke and need help to quit  E-cigarettes or smokeless tobacco still contain nicotine   Talk to your healthcare provider before you use these products  Follow up with your healthcare provider as directed: Follow up if your symptoms are worse or not better after 3 to 5 days of treatment  Write down your questions so you remember to ask them during your visits  © 2017 [address] Information is for End User's use only and may not be sold, redistributed or otherwise used for commercial purposes  All illustrations and images included in CareNotes® are the copyrighted property of A D A M , [employer]  or [address]  The above information is an  only  It is not intended as medical advice for individual conditions or treatments  Talk to your doctor, nurse or pharmacist before following any medical regimen to see if it is safe and effective for you

## 2018-08-22 ENCOUNTER — TELEPHONE (OUTPATIENT)
Dept: FAMILY MEDICINE CLINIC | Facility: CLINIC | Age: 58
End: 2018-08-22

## 2018-08-22 DIAGNOSIS — E78.2 MIXED HYPERLIPIDEMIA: Primary | ICD-10-CM

## 2018-08-22 DIAGNOSIS — L65.9 ALOPECIA: ICD-10-CM

## 2018-08-22 DIAGNOSIS — E55.9 VITAMIN D DEFICIENCY: ICD-10-CM

## 2018-08-23 ENCOUNTER — TELEPHONE (OUTPATIENT)
Dept: FAMILY MEDICINE CLINIC | Facility: CLINIC | Age: 58
End: 2018-08-23

## 2018-08-23 DIAGNOSIS — E78.2 MIXED HYPERLIPIDEMIA: Primary | ICD-10-CM

## 2018-08-23 DIAGNOSIS — E55.9 VITAMIN D DEFICIENCY: ICD-10-CM

## 2018-08-23 NOTE — TELEPHONE ENCOUNTER
YOU PUT LABS IN FOR PATIENT YESTERDAY FOR [facility]  HER INSURANCE HAS CHANGED AND [facility] WILL [facility]ING [facility]  CAN YOU PLEASE CHANGE THAT TO [facility] PLEASE? THANK YOU!

## 2018-08-31 LAB
25(OH)D3 SERPL-MCNC: 84 NG/ML (ref 30–100)
ALBUMIN SERPL-MCNC: 4 G/DL (ref 3.6–5.1)
ALBUMIN/GLOB SERPL: 1.7 (CALC) (ref 1–2.5)
ALP SERPL-CCNC: 77 U/L (ref 33–130)
ALT SERPL-CCNC: 27 U/L (ref 6–29)
AST SERPL-CCNC: 30 U/L (ref 10–35)
BASOPHILS # BLD AUTO: 51 CELLS/UL (ref 0–200)
BASOPHILS NFR BLD AUTO: 1 %
BILIRUB SERPL-MCNC: 0.4 MG/DL (ref 0.2–1.2)
BUN SERPL-MCNC: 15 MG/DL (ref 7–25)
BUN/CREAT SERPL: NORMAL (CALC) (ref 6–22)
CALCIUM SERPL-MCNC: 9.6 MG/DL (ref 8.6–10.4)
CHLORIDE SERPL-SCNC: 104 MMOL/L (ref 98–110)
CHOLEST SERPL-MCNC: 270 MG/DL
CHOLEST/HDLC SERPL: 3.9 (CALC)
CO2 SERPL-SCNC: 26 MMOL/L (ref 20–32)
CREAT SERPL-MCNC: 0.82 MG/DL (ref 0.5–1.05)
EOSINOPHIL # BLD AUTO: 158 CELLS/UL (ref 15–500)
EOSINOPHIL NFR BLD AUTO: 3.1 %
ERYTHROCYTE [DISTWIDTH] IN BLOOD BY AUTOMATED COUNT: 12.8 % (ref 11–15)
GLOBULIN SER CALC-MCNC: 2.4 G/DL (CALC) (ref 1.9–3.7)
GLUCOSE SERPL-MCNC: 94 MG/DL (ref 65–99)
HCT VFR BLD AUTO: 47.6 % (ref 35–45)
HDLC SERPL-MCNC: 70 MG/DL
HGB BLD-MCNC: 16.5 G/DL (ref 11.7–15.5)
LDLC SERPL CALC-MCNC: 171 MG/DL (CALC)
LYMPHOCYTES # BLD AUTO: 1540 CELLS/UL (ref 850–3900)
LYMPHOCYTES NFR BLD AUTO: 30.2 %
MCH RBC QN AUTO: 32.4 PG (ref 27–33)
MCHC RBC AUTO-ENTMCNC: 34.7 G/DL (ref 32–36)
MCV RBC AUTO: 93.3 FL (ref 80–100)
MONOCYTES # BLD AUTO: 546 CELLS/UL (ref 200–950)
MONOCYTES NFR BLD AUTO: 10.7 %
NEUTROPHILS # BLD AUTO: 2805 CELLS/UL (ref 1500–7800)
NEUTROPHILS NFR BLD AUTO: 55 %
NONHDLC SERPL-MCNC: 200 MG/DL (CALC)
PLATELET # BLD AUTO: 249 THOUSAND/UL (ref 140–400)
PMV BLD REES-ECKER: 9.2 FL (ref 7.5–12.5)
POTASSIUM SERPL-SCNC: 4.4 MMOL/L (ref 3.5–5.3)
PROT SERPL-MCNC: 6.4 G/DL (ref 6.1–8.1)
RBC # BLD AUTO: 5.1 MILLION/UL (ref 3.8–5.1)
SL AMB EGFR AFRICAN AMERICAN: 91 ML/MIN/1.73M2
SL AMB EGFR NON AFRICAN AMERICAN: 78 ML/MIN/1.73M2
SODIUM SERPL-SCNC: 141 MMOL/L (ref 135–146)
TRIGL SERPL-MCNC: 148 MG/DL
TSH SERPL-ACNC: 2.06 MIU/L (ref 0.4–4.5)
WBC # BLD AUTO: 5.1 THOUSAND/UL (ref 3.8–10.8)

## 2018-10-02 ENCOUNTER — ANNUAL EXAM (OUTPATIENT)
Dept: OBGYN CLINIC | Facility: CLINIC | Age: 58
End: 2018-10-02
Payer: COMMERCIAL

## 2018-10-02 VITALS
DIASTOLIC BLOOD PRESSURE: 64 MMHG | BODY MASS INDEX: 18.69 KG/M2 | SYSTOLIC BLOOD PRESSURE: 122 MMHG | HEIGHT: 60 IN | WEIGHT: 95.2 LBS

## 2018-10-02 DIAGNOSIS — Z12.31 ENCOUNTER FOR SCREENING MAMMOGRAM FOR MALIGNANT NEOPLASM OF BREAST: Primary | ICD-10-CM

## 2018-10-02 PROCEDURE — 99396 PREV VISIT EST AGE 40-64: CPT | Performed by: OBSTETRICS & GYNECOLOGY

## 2018-10-02 NOTE — PROGRESS NOTES
A/P    1  Annual Exam     last Pap smear October 2017 Normal    discussed settling of Pap smears her next Pap smear will be due in 2020       mammogram October 17, 2017 referral given for next Pap in October      colonoscopy up-to-date     58-year-old female presents for annual exam   denies any vaginal bleeding   no vaginal discharge   no pain with intercourse    Past medical / social / surgical / family history reviewed and updated   Medication and allergies discussed in detail and updated       Review of Systems - History obtained from chart review and the patient  General ROS: negative  Psychological ROS: negative  Ophthalmic ROS: negative  ENT ROS: negative  Allergy and Immunology ROS: negative  Hematological and Lymphatic ROS: negative  Endocrine ROS: negative  Breast ROS: negative for breast lumps  Respiratory ROS: no cough, shortness of breath, or wheezing  Cardiovascular ROS: no chest pain or dyspnea on exertion  Gastrointestinal ROS: no abdominal pain, change in bowel habits, or black or bloody stools  Genito-Urinary ROS: no dysuria, trouble voiding, or hematuria  Musculoskeletal ROS:  Positive heel pain status post fracture  Neurological ROS: no TIA or stroke symptoms  Dermatological ROS: negative      /64   Ht 4' 11 5" (1 511 m)   Wt 43 2 kg (95 lb 3 2 oz)   BMI 18 91 kg/m²     Physical Exam   Constitutional: She is oriented to person, place, and time  She appears well-developed  Eyes: Pupils are equal, round, and reactive to light  Neck: Normal range of motion  No thyromegaly present  Cardiovascular: Normal rate  Pulmonary/Chest: Effort normal  No respiratory distress  Right breast exhibits no inverted nipple, no mass and no tenderness  Left breast exhibits no inverted nipple, no mass and no tenderness  Breasts are symmetrical    Abdominal: Soft  She exhibits no distension  There is no tenderness     Genitourinary: Vagina normal and uterus normal  Rectal exam shows no external hemorrhoid  Cervix exhibits no motion tenderness and no discharge  Right adnexum displays no mass, no tenderness and no fullness  Left adnexum displays no mass, no tenderness and no fullness  No vaginal discharge found  Lymphadenopathy:     She has no cervical adenopathy  Neurological: She is alert and oriented to person, place, and time  Psychiatric: She has a normal mood and affect  Her behavior is normal  Judgment and thought content normal    Vitals reviewed

## 2018-10-02 NOTE — PATIENT INSTRUCTIONS
Pap Smear   GENERAL INFORMATION:   What is a Pap smear? A Pap smear, or Pap test, is a procedure to check your cervix for abnormal cells  The cervix is the narrow opening at the bottom of your uterus  The cervix meets the top part of the vagina  How do I prepare for a Pap smear? The best time to schedule the test is right after your period stops  Do not have a Pap smear during your monthly period  Do not have intercourse or put anything in your vagina for 24 hours before your test    What will happen during a Pap smear? · You will lie on your back and place your feet on footrests called stirrups  Your caregiver will gently insert a device called a speculum into your vagina  The speculum is used to spread the walls of your vagina so he can see your cervix  He will use a thin brush or cotton swab to collect cells from the inside of your cervix  · Your caregiver will also collect cells from the surface of your cervix with a plastic or wooden tool called a spatula  He may also gently scrape the upper part of your vagina for a sample  The samples are placed in a container with liquid or on a glass slide  They are sent to a lab and examined for abnormal cells  How often do I need a Pap smear? Pap smears are usually done every 1 to 3 years  You may need a Pap smear more often if you have any of the following:  · Positive test result for the human papillomavirus (HPV)    · Cervical intraepithelial neoplasm or cervical cancer    · HIV    · A weak immune system    · Exposure to diethylstilbestrol (BRENDA) medicine when your mother was pregnant with you  CARE AGREEMENT:   You have the right to help plan your care  Learn about your health condition and how it may be treated  Discuss treatment options with your caregivers to decide what care you want to receive  You always have the right to refuse treatment  The above information is an  only   It is not intended as medical advice for individual conditions or treatments  Talk to your doctor, nurse or pharmacist before following any medical regimen to see if it is safe and effective for you  © 2014 [address] is for End User's use only and may not be sold, redistributed or otherwise used for commercial purposes  All illustrations and images included in CareNotes® are the copyrighted property of A D A M , Inc  or [employer]

## 2018-10-08 ENCOUNTER — APPOINTMENT (OUTPATIENT)
Dept: RADIOLOGY | Facility: CLINIC | Age: 58
End: 2018-10-08
Payer: COMMERCIAL

## 2018-10-08 ENCOUNTER — TELEPHONE (OUTPATIENT)
Dept: OBGYN CLINIC | Facility: HOSPITAL | Age: 58
End: 2018-10-08

## 2018-10-08 ENCOUNTER — OFFICE VISIT (OUTPATIENT)
Dept: OBGYN CLINIC | Facility: CLINIC | Age: 58
End: 2018-10-08
Payer: COMMERCIAL

## 2018-10-08 VITALS
WEIGHT: 96.8 LBS | SYSTOLIC BLOOD PRESSURE: 124 MMHG | BODY MASS INDEX: 19.01 KG/M2 | DIASTOLIC BLOOD PRESSURE: 72 MMHG | HEIGHT: 60 IN | HEART RATE: 86 BPM

## 2018-10-08 DIAGNOSIS — M79.671 PAIN IN RIGHT FOOT: Primary | ICD-10-CM

## 2018-10-08 DIAGNOSIS — M79.671 PAIN IN RIGHT FOOT: ICD-10-CM

## 2018-10-08 DIAGNOSIS — M84.374A STRESS FRACTURE OF RIGHT CALCANEUS: ICD-10-CM

## 2018-10-08 PROCEDURE — 73630 X-RAY EXAM OF FOOT: CPT

## 2018-10-08 PROCEDURE — 28400 CLTX CALCANEAL FX W/O MNPJ: CPT | Performed by: ORTHOPAEDIC SURGERY

## 2018-10-08 PROCEDURE — 99243 OFF/OP CNSLTJ NEW/EST LOW 30: CPT | Performed by: ORTHOPAEDIC SURGERY

## 2018-10-08 NOTE — LETTER
October 8, 2018     Patient: Eran Iqbal   YOB: 1960   Date of Visit: 10/8/2018       To Whom it May Concern:    Eran Iqbal is under my professional care  She was seen in my office on 10/8/2018  She has a calcaneal stress fracture in her right foot and cannot drive  She is able to perform work activities that do not involve prolonged weightbearing and may be on light duty or desk work  She is not to bear weight or lift anything while on her feet and must use the cane while ambulating  If you have any questions or concerns, please don't hesitate to call           Sincerely,          Padma Singh MD        CC: Eran Iqbal

## 2018-10-08 NOTE — TELEPHONE ENCOUNTER
Rj- dr Nancy Petit patient  Ph- [phone]  Patient forgot to ask during todays office visit: May she take the boot off while she is sitting at a reclining position? Also, she just wanted to be sure she may walk on the boot while using a cane?  Thank you

## 2018-10-08 NOTE — PROGRESS NOTES
MERT Cai  Attending, Orthopaedic Surgery  [facility] [address] [facility]        ORTHOPAEDIC FOOT [facility] ANKLE CLINIC VISIT     Assessment:     Encounter Diagnoses   Name Primary?  Pain in right foot Yes    Stress fracture of right calcaneus               Plan:   · The patient verbalized understanding of exam findings and treatment plan  We engaged in the shared decision-making process and treatment options were discussed at length with the patient  Surgical and conservative management discussed today along with risks and benefits  · She recovered from her 3rd MT fracture but returned to her activities after 8 weeks of non-weightbearing too fast  She now has developed a calcaneal stress fracture  · We will treat her 6 weeks in a CAM boot  She should continue her Vit D and calcium supplementation  · Return in about 6 weeks (around 11/19/2018)  History of Present Illness:   Chief Complaint:   Chief Complaint   Patient presents with   Kerry Goodson - Pain     Thania Hollis is a 60 y o  female who is being seen for right heel pain  She was recently treated for a 3rd MT fracture and was nonweightbearing in a boot for 8 weeks  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 immediately after the period of immobilization  Pain is localized at calcaneus with minimal radiating and described as sharp and severe  She is here for a second opinion  Her pain can be 10/10 when weightbearing  She tried the boot but it did not help  Patient denies numbness, tingling or radicular pain  Denies history of neuropathy  Patient does not smoke, does not have diabetes and does not take blood thinners  Patient denies family history of anesthesia complications and has not had any complications with anesthesia       Pain/symptom timing:  Worse during the day when active  Pain/symptom context:  Worse with activites and work  Pain/symptom modifying factors: Rest makes better, activities make worse  Pain/symptom associated signs/symptoms: none    Prior treatment   · NSAIDsYes    · Injections No   · Bracing/Orthotics Yes   · Physical Therapy No     Orthopedic Surgical History:   See above    Past Medical, Surgical and Social History:  Past Medical History:  has a past medical history of Nephrolithiasis; Sessile colonic polyp; and TMJ (temporomandibular joint disorder)  Problem List:  does not have any pertinent problems on file  Past Surgical History:  has a past surgical history that includes Appendectomy; Colonoscopy; Colposcopy; and Mandible fracture surgery  Family History: family history includes Stroke in her father  Social History:  reports that she has been smoking  She has been smoking about 0 50 packs per day  She has never used smokeless tobacco  She reports that she drinks alcohol  She reports that she does not use drugs  Current Medications: has a current medication list which includes the following prescription(s): aspirin, cholecalciferol, and multivitamin  Allergies: is allergic to cefaclor; cephalexin; egg-pro  [alitraq]; augmentin [amoxicillin-pot clavulanate]; and avelox [moxifloxacin]       Review of Systems:  General- denies fever/chills  HEENT- denies hearing loss or sore throat  Eyes- denies eye pain or visual disturbances, denies red eyes  Respiratory- denies cough or SOB  Cardio- denies chest pain or palpitations  GI- denies abdominal pain  Endocrine- denies urinary frequency  Urinary- denies pain with urination  Musculoskeletal- Negative except noted above  Skin- denies rashes or wounds  Neurological- denies dizziness or headache  Psychiatric- denies anxiety or difficulty concentrating    Physical Exam:   /72 (BP Location: Left arm, Patient Position: Sitting, Cuff Size: Adult)   Pulse 86   Ht 5' (1 524 m)   Wt 43 9 kg (96 lb 12 8 oz)   BMI 18 90 kg/m²   General/Constitutional: No apparent distress: well-nourished and well developed  Eyes: normal ocular motion  Lymphatic: No appreciable lymphadenopathy  Respiratory: Non-labored breathing  Vascular: No edema, swelling or tenderness, except as noted in detailed exam   Integumentary: No impressive skin lesions present, except as noted in detailed exam   Neuro: No ataxia or tremors noted  Psych: Normal mood and affect, oriented to person, place and time  Appropriate affect  Musculoskeletal: Normal, except as noted in detailed exam and in HPI  Examination    Right    Gait Antalgic   Musculoskeletal Tender to palpation at calcaneus    Skin Normal   Swelling medially and laterally    Nails Normal    Range of Motion  20 degrees dorsiflexion, 40 degrees plantarflexion  Subtalar motion: 15e, 20i    Stability Stable    Muscle Strength 5/5 tibialis anterior  5/5 gastrocnemius-soleus  5/5 posterior tibialis  5/5 peroneal/eversion strength  5/5 EHL  5/5 FHL    Neurologic Normal    Sensation Intact to light touch throughout sural, saphenous, superficial peroneal, deep peroneal and medial/lateral plantar nerve distributions  Harper-Marie 5 07 filament (10g) testing deferred  Cardiovascular Brisk capillary refill < 2 seconds,intact DP and PT pulses    Special Tests +Calcaneal compression test      Imaging Studies:   3 views of the right foot were taken, reviewed and interpreted independently that demonstrate healed 3rd MT fracture which is non-displaced  No other bony abnormality      Fracture / Dislocation Treatment  Date/Time: 10/8/2018 4:38 PM  Performed by: Neo Trejo  Authorized by: Neo Trejo     Patient Location:  Clinic  Verbal consent obtained?: Yes    Consent given by:  Patient  Patient states understanding of procedure being performed: Yes    Test results available and properly labeled: Yes    Patient identity confirmed:  Verbally with patient  Injury location:  Foot  Location details:  Right foot  Injury type:  Fracture  Fracture type: calcaneal    Distal perfusion: normal    Neurological function: normal    Range of motion: reduced    Local anesthesia used?: No    Manipulation performed?: No    Immobilization:  Brace  Neurovascular status: Neurovascularly intact    Distal perfusion: normal    Neurological function: normal    Range of motion: unchanged    Patient tolerance:  Patient tolerated the procedure well with no immediate complications            Newt Adas Lachman, MD  Foot & Ankle Surgery   Department [address]      I personally performed the service  Newt Adas Lachman, MD

## 2018-10-18 ENCOUNTER — TRANSCRIBE ORDERS (OUTPATIENT)
Dept: ADMINISTRATIVE | Facility: HOSPITAL | Age: 58
End: 2018-10-18

## 2018-10-18 DIAGNOSIS — M81.0 AGE RELATED OSTEOPOROSIS, UNSPECIFIED PATHOLOGICAL FRACTURE PRESENCE: Primary | ICD-10-CM

## 2018-10-30 ENCOUNTER — HOSPITAL ENCOUNTER (OUTPATIENT)
Dept: MAMMOGRAPHY | Facility: MEDICAL CENTER | Age: 58
Discharge: HOME | End: 2018-10-30
Payer: COMMERCIAL

## 2018-10-30 DIAGNOSIS — Z12.31 ENCOUNTER FOR SCREENING MAMMOGRAM FOR MALIGNANT NEOPLASM OF BREAST: ICD-10-CM

## 2018-10-30 PROCEDURE — 77067 SCR MAMMO BI INCL CAD: CPT

## 2018-10-30 PROCEDURE — 77063 BREAST TOMOSYNTHESIS BI: CPT

## 2018-11-13 ENCOUNTER — HOSPITAL ENCOUNTER (OUTPATIENT)
Dept: BONE DENSITY | Facility: IMAGING CENTER | Age: 58
Discharge: HOME | End: 2018-11-13
Payer: COMMERCIAL

## 2018-11-13 DIAGNOSIS — M81.0 AGE RELATED OSTEOPOROSIS, UNSPECIFIED PATHOLOGICAL FRACTURE PRESENCE: ICD-10-CM

## 2018-11-13 PROCEDURE — 77080 DXA BONE DENSITY AXIAL: CPT

## 2018-11-29 ENCOUNTER — OFFICE VISIT (OUTPATIENT)
Dept: OBGYN CLINIC | Facility: CLINIC | Age: 58
End: 2018-11-29

## 2018-11-29 VITALS
SYSTOLIC BLOOD PRESSURE: 129 MMHG | HEART RATE: 69 BPM | HEIGHT: 60 IN | BODY MASS INDEX: 18.9 KG/M2 | DIASTOLIC BLOOD PRESSURE: 79 MMHG

## 2018-11-29 DIAGNOSIS — M77.41 METATARSALGIA OF RIGHT FOOT: ICD-10-CM

## 2018-11-29 DIAGNOSIS — M84.374A STRESS FRACTURE OF RIGHT CALCANEUS: Primary | ICD-10-CM

## 2018-11-29 PROCEDURE — 99024 POSTOP FOLLOW-UP VISIT: CPT | Performed by: ORTHOPAEDIC SURGERY

## 2018-11-29 NOTE — PROGRESS NOTES
MERT Saldana  Attending, Orthopaedic Surgery  Foot and [address] [facility] [address] [facility]      ORTHOPAEDIC FOOT AND ANKLE CLINIC VISIT     Assessment:     Encounter Diagnoses   Name Primary?  Stress fracture of right calcaneus Yes    Metatarsalgia of right foot             Plan:   · The patient verbalized understanding of exam findings and treatment plan  We engaged in the shared decision-making process and treatment options were discussed at length with the patient  Surgical and conservative management discussed today along with risks and benefits  · She is feeling much better  It is time to start weaning the boot slowly over the next 2 weeks  · We will refer her to PT to improve her strength and function prior to full return to activities  Return in about 4 weeks (around 12/27/2018)  History of Present Illness:   Chief Complaint:   Chief Complaint   Patient presents with   Campos Villegas is a 60 y o  female who is being seen in follow-up for Right calc stress fracture  When we last saw she we recommended boot for 6 weeks  Pain has signficantly improved  She has no residual pain  Pain/symptom timing:  Worse during the day when active  Pain/symptom context:  Worse with activites and work  Pain/symptom modifying factors:  Rest makes better, activities make worse  Pain/symptom associated signs/symptoms: none    Prior treatment   · NSAIDsYes   · Injections No   · Bracing/Orthotics Yes    · Physical Therapy No     Orthopedic Surgical History:   See above    Past Medical, Surgical and Social History:  Past Medical History:  has a past medical history of Nephrolithiasis; Sessile colonic polyp; and TMJ (temporomandibular joint disorder)  Problem List:  does not have any pertinent problems on file  Past Surgical History:  has a past surgical history that includes Appendectomy; Colonoscopy; Colposcopy; and Mandible fracture surgery    Family History: family history includes Stroke in her father  Social History:  reports that she has been smoking  She has been smoking about 0 50 packs per day  She has never used smokeless tobacco  She reports that she drinks alcohol  She reports that she does not use drugs  Current Medications: has a current medication list which includes the following prescription(s): aspirin, calcium citrate, cholecalciferol, and multivitamin  Allergies: is allergic to cefaclor; cephalexin; egg-pro  [alitraq]; augmentin [amoxicillin-pot clavulanate]; and avelox [moxifloxacin]  Review of Systems:  General- denies fever/chills  HEENT- denies hearing loss or sore throat  Eyes- denies eye pain or visual disturbances, denies red eyes  Respiratory- denies cough or SOB  Cardio- denies chest pain or palpitations  GI- denies abdominal pain  Endocrine- denies urinary frequency  Urinary- denies pain with urination  Musculoskeletal- Negative except noted above  Skin- denies rashes or wounds  Neurological- denies dizziness or headache  Psychiatric- denies anxiety or difficulty concentrating    Physical Exam:   /79 (BP Location: Right arm, Patient Position: Sitting, Cuff Size: Adult)   Pulse 69   Ht 5' (1 524 m)   BMI 18 90 kg/m²   General/Constitutional: No apparent distress: well-nourished and well developed  Eyes: normal ocular motion  Lymphatic: No appreciable lymphadenopathy  Respiratory: Non-labored breathing  Vascular: No edema, swelling or tenderness, except as noted in detailed exam   Integumentary: No impressive skin lesions present, except as noted in detailed exam   Neuro: No ataxia or tremors noted  Psych: Normal mood and affect, oriented to person, place and time  Appropriate affect  Musculoskeletal: Normal, except as noted in detailed exam and in HPI      Examination    Right    Gait Normal   Musculoskeletal No TTP    Skin Normal       Nails Normal    Range of Motion  10 degrees dorsiflexion, 30 degrees plantarflexion  Subtalar motion: normal    Stability Stable    Muscle Strength 5/5 tibialis anterior  5/5 gastrocnemius-soleus  5/5 posterior tibialis  5/5 peroneal/eversion strength  5/5 EHL  5/5 FHL    Neurologic Normal    Sensation Intact to light touch throughout sural, saphenous, superficial peroneal, deep peroneal and medial/lateral plantar nerve distributions  [place] 5 07 filament (10g) testing deferred  Cardiovascular Brisk capillary refill < 2 seconds,intact DP and PT pulses    Special Tests None      Imaging Studies:   No new imaging          James R Lachman, MD  Foot & Ankle Surgery   Department of [address] [place] [address]      I personally performed the service  Newt Adas Lachman, MD

## 2018-11-30 ENCOUNTER — TELEPHONE (OUTPATIENT)
Dept: OBGYN CLINIC | Facility: HOSPITAL | Age: 58
End: 2018-11-30

## 2018-11-30 NOTE — TELEPHONE ENCOUNTER
Levonne Fonder Dr Lachman Patient  - [phone]  Patient saw Dr Selena Jurado yesterday  He started to wean her off the boot  She is asking if she is allowed to casually drive? Only when she's walking and in sneakers (out of the boot)

## 2018-12-03 ENCOUNTER — TELEPHONE (OUTPATIENT)
Dept: OBGYN CLINIC | Facility: HOSPITAL | Age: 58
End: 2018-12-03

## 2018-12-03 NOTE — TELEPHONE ENCOUNTER
Left message with full details regarding driving restrictions and when she may resume driving again, thank you

## 2018-12-03 NOTE — TELEPHONE ENCOUNTER
patient returned call for Nettie Damian  Patient ask if you could please leave a little more detail in v/m if she does not    Best contact is [phone]

## 2018-12-06 ENCOUNTER — EVALUATION (OUTPATIENT)
Dept: PHYSICAL THERAPY | Facility: REHABILITATION | Age: 58
End: 2018-12-06
Payer: COMMERCIAL

## 2018-12-06 ENCOUNTER — TRANSCRIBE ORDERS (OUTPATIENT)
Dept: PHYSICAL THERAPY | Facility: REHABILITATION | Age: 58
End: 2018-12-06

## 2018-12-06 DIAGNOSIS — M77.41 METATARSALGIA OF RIGHT FOOT: ICD-10-CM

## 2018-12-06 DIAGNOSIS — M84.374A STRESS FRACTURE OF RIGHT CALCANEUS: ICD-10-CM

## 2018-12-06 DIAGNOSIS — M84.374A STRESS FRACTURE OF RIGHT FOOT, INITIAL ENCOUNTER: Primary | ICD-10-CM

## 2018-12-06 PROCEDURE — 97161 PT EVAL LOW COMPLEX 20 MIN: CPT | Performed by: PHYSICAL THERAPIST

## 2018-12-06 PROCEDURE — G8979 MOBILITY GOAL STATUS: HCPCS | Performed by: PHYSICAL THERAPIST

## 2018-12-06 PROCEDURE — G8978 MOBILITY CURRENT STATUS: HCPCS | Performed by: PHYSICAL THERAPIST

## 2018-12-06 NOTE — PROGRESS NOTES
PT Evaluation     Today's date: 2018  Patient name: Nancy Pino  : 1960  MRN: [id number]  Referring provider: Loretta Justice MD  Dx:   Encounter Diagnosis     ICD-10-CM    1  Stress fracture of right calcaneus M84 374A Ambulatory referral to Physical Therapy   2  Metatarsalgia of right foot M77 41 Ambulatory referral to Physical Therapy                  Assessment  Assessment details: 61 y/o female with c/o right foot pain s/p a right foot fracture 4 months ago and a recent calcaneal fracture when she was weaned from the CAM boot  Impairments: abnormal gait, abnormal or restricted ROM, activity intolerance, impaired balance, impaired physical strength, lacks appropriate home exercise program and pain with function  Barriers to therapy: Apprehension due to two recent right foot fractures  Understanding of Dx/Px/POC: good   Prognosis: good    Goals  ST - I with HEP  2 - right ankle df arom > 5 degrees  LT - FOTO > 61  2 - amb community distances without ankle     Plan  Patient would benefit from: skilled physical therapy  Planned therapy interventions: joint mobilization, manual therapy, neuromuscular re-education, patient education, strengthening, home exercise program and gait training  Frequency: 2x week  Duration in visits: 18  Treatment plan discussed with: patient        Subjective Evaluation    Quality of life: good    Pain  Relieving factors: rest  Aggravating factors: standing and walking      Diagnostic Tests  X-ray: abnormal        Objective     Palpation     Right   No palpable tenderness to the anterior tibialis and peroneus  Tenderness of the lateral gastrocnemius and medial gastrocnemius  Tenderness     Right Ankle/Foot   Tenderness in the fifth metatarsal base  No tenderness in the first metatarsal head, lateral malleolus, medial malleolus and plantar fascia       Neurological Testing     Sensation     Ankle/Foot   Left Ankle/Foot   Intact: light touch    Right Ankle/Foot   Intact: light touch     Active Range of Motion     Right Ankle/Foot   Dorsiflexion (ke): -5 degrees   Dorsiflexion (kf): 0 degrees   Great toe extension: [email or web address]    Joint Play     Right Ankle/Foot  Hypermobile in the forefoot  Hypomobile in the talocrural joint and midfoot  Strength/Myotome Testing     Right Ankle/Foot   Dorsiflexion: WFL  Inversion: WFL     Eversion: 4+    General Comments     Ankle/Foot Comments   Standing:  Increased weightbearing on left LE to favor right    Gait:  Decreased push-off on right leading to a short step length          Precautions: osteoporosis    Daily Treatment Diary     Manual  12/06                                                                                 Exercise Diary  12/06            gastroc stretch - towel 1'x3            Seated - HR 2x25            Ankle ABC's 30            Bike - arom             VG - HR             biodex             rocker board - arom             Gait training                                                                                                                                                                             Modalities

## 2018-12-06 NOTE — LETTER
2018    Nahid Cai [facility] [address]    Patient: Thania Hollis   YOB: 1960   Date of Visit: 2018     Encounter Diagnosis     ICD-10-CM    1  Stress fracture of right calcaneus M84 374A Ambulatory referral to Physical Therapy   2  Metatarsalgia of right foot M77 41 Ambulatory referral to Physical Therapy       Dear Dr Javed Cords:    Please review the attached Plan of Care from [address] Garner recent visit  Please verify that you agree therapy should continue by signing the attached document and sending it back to our office  If you have any questions or concerns, please don't hesitate to call  Sincerely,    Suly Ireland PT      Referring Provider:      I certify that I have read the below Plan of Care and certify the need for these services furnished under this plan of treatment while under my care  MD Osei [place] [id number] [address]          PT Evaluation     Today's date: 2018  Patient name: Thania Hollis  : 1960  MRN: [id number]  Referring provider: Deshawn York MD  Dx:   Encounter Diagnosis     ICD-10-CM    1  Stress fracture of right calcaneus M84 374A Ambulatory referral to Physical Therapy   2  Metatarsalgia of right foot M77 41 Ambulatory referral to Physical Therapy                  Assessment  Assessment details: 61 y/o female with c/o right foot pain s/p a right foot fracture 4 months ago and a recent calcaneal fracture when she was weaned from the CAM boot     Impairments: abnormal gait, abnormal or restricted ROM, activity intolerance, impaired balance, impaired physical strength, lacks appropriate home exercise program and pain with function  Barriers to therapy: Apprehension due to two recent right foot fractures  Understanding of Dx/Px/POC: good   Prognosis: good    Goals  ST - I with HEP  2 - right ankle df arom > 5 degrees  LT - FOTO > 61  2 - amb community distances without ankle     Plan  Patient would benefit from: skilled physical therapy  Planned therapy interventions: joint mobilization, manual therapy, neuromuscular re-education, patient education, strengthening, home exercise program and gait training  Frequency: 2x week  Duration in visits: 18  Treatment plan discussed with: patient        Subjective Evaluation    Quality of life: good    Pain  Relieving factors: rest  Aggravating factors: standing and walking      Diagnostic Tests  X-ray: abnormal        Objective     Palpation     Right   No palpable tenderness to the anterior tibialis and peroneus  Tenderness of the lateral gastrocnemius and medial gastrocnemius  Tenderness     Right Ankle/Foot   Tenderness in the fifth metatarsal base  No tenderness in the first metatarsal head, lateral malleolus, medial malleolus and plantar fascia  Neurological Testing     Sensation     Ankle/Foot   Left Ankle/Foot   Intact: light touch    Right Ankle/Foot   Intact: light touch     Active Range of Motion     Right Ankle/Foot   Dorsiflexion (ke): -5 degrees   Dorsiflexion (kf): 0 degrees   Great toe extension: [employer] [facility] [employer]    Joint Play     Right Ankle/Foot  Hypermobile in the forefoot  Hypomobile in the talocrural joint and midfoot  Strength/Myotome Testing     Right Ankle/Foot   Dorsiflexion: WFL  Inversion: WFL     Eversion: 4+    General Comments     Ankle/Foot Comments   Standing:  Increased weightbearing on left LE to favor right    Gait:  Decreased push-off on right leading to a short step length          Precautions: osteoporosis    Daily Treatment Diary     Manual                                                                                   Exercise Diary              gastroc stretch - towel 1'x3            Seated - HR 2x25            Ankle ABC's 30            Bike - arom             VG - HR             biodex             rocker board - arom Gait training                                                                                                                                                                             Modalities

## 2018-12-11 ENCOUNTER — OFFICE VISIT (OUTPATIENT)
Dept: PHYSICAL THERAPY | Facility: REHABILITATION | Age: 58
End: 2018-12-11
Payer: COMMERCIAL

## 2018-12-11 DIAGNOSIS — M84.374A STRESS FRACTURE OF RIGHT CALCANEUS: Primary | ICD-10-CM

## 2018-12-11 DIAGNOSIS — M77.41 METATARSALGIA OF RIGHT FOOT: ICD-10-CM

## 2018-12-11 PROCEDURE — 97140 MANUAL THERAPY 1/> REGIONS: CPT | Performed by: PHYSICAL THERAPIST

## 2018-12-11 PROCEDURE — 97110 THERAPEUTIC EXERCISES: CPT | Performed by: PHYSICAL THERAPIST

## 2018-12-13 ENCOUNTER — OFFICE VISIT (OUTPATIENT)
Dept: PHYSICAL THERAPY | Facility: REHABILITATION | Age: 58
End: 2018-12-13
Payer: COMMERCIAL

## 2018-12-13 DIAGNOSIS — M84.374A STRESS FRACTURE OF RIGHT CALCANEUS: Primary | ICD-10-CM

## 2018-12-13 DIAGNOSIS — M77.41 METATARSALGIA OF RIGHT FOOT: ICD-10-CM

## 2018-12-13 PROCEDURE — 97140 MANUAL THERAPY 1/> REGIONS: CPT | Performed by: PHYSICAL THERAPIST

## 2018-12-13 PROCEDURE — 97110 THERAPEUTIC EXERCISES: CPT | Performed by: PHYSICAL THERAPIST

## 2018-12-13 NOTE — PROGRESS NOTES
Daily Note     Today's date: 2018  Patient name: Eran Iqbal  : 1960  MRN: [id number]  Referring provider: Lavon Rubio MD  Dx:   Encounter Diagnosis     ICD-10-CM    1  Stress fracture of right calcaneus M84 374A    2  Metatarsalgia of right foot M77 41        Start Time: 1015  Stop Time: 1100  Total time in clinic (min): 45 minutes    Subjective: Pt states she felt "great" for the entire day after her last PT session  Objective: See treatment diary below  Sig increased tissue texture of the soleus  Assessment: Tolerated treatment well  Patient would benefit from continued PT      Plan: Continue per plan of care         Precautions: osteoporosis    Daily Treatment Diary     Manual            Prone - tissue deformation - soleus  LMR LMR          Prone - tissue deformation - gastroc  LMR LMR          Supine - gr 2 calcaneal fig-8 mobs  LMR LMR          Supine - tissue deformation - peroneals  LMR           Gr 4 post TCJ glide  LMR LMR                                                                               Exercise Diary            gastroc stretch - towel 1'x3 hep           Seated - HR 2x25 hep           Ankle ABC's 30            Bike - arom             VG - HR  L3 - 4'           biodex             rocker board - arom  nv Stand - 2'          VG - squats  L4 - 5' L4 - 5'          Gait training  nv 3'          gastroc stretch - wall   1'x3          Soleus stretch - wall   1'x3          Rocker board - balance   50                                                                                                                      Modalities

## 2018-12-18 ENCOUNTER — OFFICE VISIT (OUTPATIENT)
Dept: PHYSICAL THERAPY | Facility: REHABILITATION | Age: 58
End: 2018-12-18
Payer: COMMERCIAL

## 2018-12-18 DIAGNOSIS — M84.374A STRESS FRACTURE OF RIGHT CALCANEUS: Primary | ICD-10-CM

## 2018-12-18 DIAGNOSIS — M77.41 METATARSALGIA OF RIGHT FOOT: ICD-10-CM

## 2018-12-18 PROCEDURE — 97110 THERAPEUTIC EXERCISES: CPT | Performed by: PHYSICAL THERAPIST

## 2018-12-18 PROCEDURE — 97140 MANUAL THERAPY 1/> REGIONS: CPT | Performed by: PHYSICAL THERAPIST

## 2018-12-18 NOTE — PROGRESS NOTES
Daily Note     Today's date: 2018  Patient name: Elis Ortega  : 1960  MRN: [id number]  Referring provider: Bethany Lott MD  Dx:   Encounter Diagnosis     ICD-10-CM    1  Stress fracture of right calcaneus M84 374A    2  Metatarsalgia of right foot M77 41        Start Time: 1045  Stop Time: 1130  Total time in clinic (min): 45 minutes    Subjective: Pt is worried because she has pain up her calf into her knee  Objective: See treatment diary below  Assessment: Tolerated treatment well  Patient would benefit from continued PT      Plan: Continue per plan of care           Precautions: osteoporosis    Daily Treatment Diary     Manual           Prone - tissue deformation - soleus  LMR LMR LMR         Prone - tissue deformation - gastroc  LMR LMR LMR         Supine - gr 2 calcaneal fig-8 mobs  LMR LMR LMR         Supine - tissue deformation - peroneals  LMR           Gr 4 post TCJ glide  LMR LMR LMR                                                                              Exercise Diary           gastroc stretch - towel 1'x3 hep           Seated - HR 2x25 hep           Ankle ABC's 30            Bike - arom             VG - HR  L3 - 4'  L6 - fatiguex5         biodex             rocker board - arom  nv Stand - 2'          VG - squats  L4 - 5' L4 - 5'          Gait training  nv 3'          gastroc stretch - wall   1'x3 Slant board - 2'x2         Soleus stretch - wall   1'x3 Slant board - 2'x2         Rocker board - balance   50                                                                                                                      Modalities

## 2018-12-20 ENCOUNTER — OFFICE VISIT (OUTPATIENT)
Dept: PHYSICAL THERAPY | Facility: REHABILITATION | Age: 58
End: 2018-12-20
Payer: COMMERCIAL

## 2018-12-20 DIAGNOSIS — M84.374A STRESS FRACTURE OF RIGHT CALCANEUS: Primary | ICD-10-CM

## 2018-12-20 DIAGNOSIS — M77.41 METATARSALGIA OF RIGHT FOOT: ICD-10-CM

## 2018-12-20 PROCEDURE — 97110 THERAPEUTIC EXERCISES: CPT | Performed by: PHYSICAL THERAPIST

## 2018-12-20 PROCEDURE — 97140 MANUAL THERAPY 1/> REGIONS: CPT | Performed by: PHYSICAL THERAPIST

## 2018-12-20 NOTE — PROGRESS NOTES
Daily Note     Today's date: 2018  Patient name: Vamshi Gooden  : 1960  MRN: [id number]  Referring provider: Carie Abernathy MD  Dx:   Encounter Diagnosis     ICD-10-CM    1  Stress fracture of right calcaneus M84 374A    2  Metatarsalgia of right foot M77 41        Start Time: 1015  Stop Time: 1100  Total time in clinic (min): 45 minutes    Subjective: Pt feels her ankle is much looser and is noticing improvements when walking  Objective: See treatment diary below  Assessment: Tolerated treatment well  Patient would benefit from continued PT      Plan: Continue per plan of care           Precautions: osteoporosis    Daily Treatment Diary     Manual          Prone - tissue deformation - soleus  LMR LMR LMR LMR        Prone - tissue deformation - gastroc  LMR LMR LMR LMR        Supine - gr 2 calcaneal fig-8 mobs  LMR LMR LMR LMR        Supine - tissue deformation - peroneals  LMR           Gr 4 post TCJ glide  LMR LMR LMR 4 & 5 - LMR        Gr 4 plantar glide mid-foot     LMR                                                                Exercise Diary          gastroc stretch - towel 1'x3 hep           Seated - HR 2x25 hep           Ankle ABC's 30            Bike - arom     L1 - 5'        VG - HR  L3 - 4'  L6 - fatiguex5 L6 - 5'        biodex             rocker board - arom  nv Stand - 2'          VG - squats  L4 - 5' L4 - 5'          Gait training  nv 3'          gastroc stretch - wall   1'x3 Slant board - 2'x2 1'x2        Soleus stretch - wall   1'x3 Slant board - 2'x2 1'x2        Rocker board - balance   50          Pro stretch     3'                                                                                                       Modalities

## 2018-12-27 ENCOUNTER — OFFICE VISIT (OUTPATIENT)
Dept: PHYSICAL THERAPY | Facility: REHABILITATION | Age: 58
End: 2018-12-27
Payer: COMMERCIAL

## 2018-12-27 DIAGNOSIS — M77.41 METATARSALGIA OF RIGHT FOOT: ICD-10-CM

## 2018-12-27 DIAGNOSIS — M84.374A STRESS FRACTURE OF RIGHT CALCANEUS: Primary | ICD-10-CM

## 2018-12-27 PROCEDURE — 97110 THERAPEUTIC EXERCISES: CPT | Performed by: PHYSICAL THERAPIST

## 2018-12-27 PROCEDURE — 97140 MANUAL THERAPY 1/> REGIONS: CPT | Performed by: PHYSICAL THERAPIST

## 2018-12-27 NOTE — PROGRESS NOTES
Daily Note     Today's date: 2018  Patient name: Jimmy Villareal  : 1960  MRN: [id number]  Referring provider: Macario Reyes MD  Dx:   Encounter Diagnosis     ICD-10-CM    1  Stress fracture of right calcaneus M84 374A    2  Metatarsalgia of right foot M77 41        Start Time: 1000  Stop Time: 1045  Total time in clinic (min): 45 minutes    Subjective: 1 - "It hurts today "  2 - She now has a slant board at home for stretching  Objective: See treatment diary below  Pt advised to perform her stretches more frequently t/o the day  Assessment: Tolerated treatment fair  Patient would benefit from continued PT      Plan: Continue per plan of care  progress gastroc and soleus strengthening           Precautions: osteoporosis    Daily Treatment Diary     Manual         Prone - tissue deformation - soleus  LMR LMR LMR LMR LMR       Prone - tissue deformation - gastroc  LMR LMR LMR LMR        Supine - gr 2 calcaneal fig-8 mobs  LMR LMR LMR LMR        Supine - tissue deformation - peroneals  LMR           Gr 4 post TCJ glide  LMR LMR LMR 4 & 5 - LMR LMR       Gr 4 plantar glide mid-foot     LMR LMR       Prone gr 4 post TCJ glide      LMR                                                  Exercise Diary         gastroc stretch - towel 1'x3 hep           Seated - HR 2x25 hep           Ankle ABC's 30            Bike - arom     L1 - 5' L1 - 6'       VG - HR  L3 - 4'  L6 - fatiguex5 L6 - 5' nv       biodex             rocker board - arom  nv Stand - 2'          VG - squats  L4 - 5' L4 - 5'          Gait training  nv 3'          gastroc stretch - wall   1'x3 Slant board - 2'x2 1'x2        Soleus stretch - wall   1'x3 Slant board - 2'x2 1'x2 5'       Rocker board - balance   50          Pro stretch     3'        Slant board - gastroc stretch      5' Modalities

## 2019-01-03 ENCOUNTER — OFFICE VISIT (OUTPATIENT)
Dept: OBGYN CLINIC | Facility: CLINIC | Age: 59
End: 2019-01-03

## 2019-01-03 ENCOUNTER — OFFICE VISIT (OUTPATIENT)
Dept: PHYSICAL THERAPY | Facility: REHABILITATION | Age: 59
End: 2019-01-03
Payer: COMMERCIAL

## 2019-01-03 VITALS
SYSTOLIC BLOOD PRESSURE: 147 MMHG | HEART RATE: 76 BPM | WEIGHT: 98.6 LBS | HEIGHT: 60 IN | BODY MASS INDEX: 19.36 KG/M2 | DIASTOLIC BLOOD PRESSURE: 85 MMHG

## 2019-01-03 DIAGNOSIS — M84.374A STRESS FRACTURE OF RIGHT CALCANEUS: Primary | ICD-10-CM

## 2019-01-03 DIAGNOSIS — M77.41 METATARSALGIA OF RIGHT FOOT: ICD-10-CM

## 2019-01-03 PROCEDURE — 97140 MANUAL THERAPY 1/> REGIONS: CPT

## 2019-01-03 PROCEDURE — 99024 POSTOP FOLLOW-UP VISIT: CPT | Performed by: ORTHOPAEDIC SURGERY

## 2019-01-03 PROCEDURE — 97110 THERAPEUTIC EXERCISES: CPT

## 2019-01-03 NOTE — PROGRESS NOTES
Daily Note     Today's date: 1/3/2019  Patient name: Brit Singh  : 1960  MRN: [id number]  Referring provider: Kings Wyatt MD  Dx:   Encounter Diagnosis     ICD-10-CM    1  Stress fracture of right calcaneus M84 374A    2  Metatarsalgia of right foot M77 41        Start Time: 1030  Stop Time: 1115  Total time in clinic (min): 45 minutes    Subjective: Patient reports that 2 days ago her pain was a "9/10" but today she has "4/10"  Patient states, "I used a ball to massage my foot and it seemed to help " Patient has a follow up with doctor today at 1400  Objective: See treatment diary below  Assessment: Tolerated treatment fair  Patient would benefit from continued PT  Patient presents with mild antalgic gait with decreased R weight bearing  Decreased myofascial restriction post therapy  Plan: Continue per plan of care        Precautions: osteoporosis    Daily Treatment Diary     Manual  12/06 12/11 12/13 12/18 12/20 12/27 1/3      Prone - tissue deformation - soleus  LMR LMR LMR LMR LMR JM      Prone - tissue deformation - gastroc  LMR LMR LMR LMR        Supine - gr 2 calcaneal fig-8 mobs  LMR LMR LMR LMR        Supine - tissue deformation - peroneals  LMR           Gr 4 post TCJ glide  LMR LMR LMR 4 & 5 - LMR LMR       Gr 4 plantar glide mid-foot     LMR LMR       Prone gr 4 post TCJ glide      LMR                                                Exercise Diary  12/06 12/11 12/13 12/18 12/20 12/27 1/3      gastroc stretch - towel 1'x3 hep           Seated - HR 2x25 hep           Ankle ABC's 30            Bike - arom     L1 - 5' L1 - 6' L1 - 6'      VG - HR  L3 - 4'  L6 - fatiguex5 L6 - 5' nv       biodex             rocker board - arom  nv Stand - 2'          VG - squats  L4 - 5' L4 - 5'          Gait training  nv 3'          gastroc stretch - wall   1'x3 Slant board - 2'x2 1'x2        Soleus stretch - wall   1'x3 Slant board - 2'x2 1'x2 5' 5'      Rocker board - balance   50 Pro stretch     3'        Slant board - gastroc stretch      5' 5'      Biodex       PC 2'  LOS 2'  Random2'                                                                         Modalities

## 2019-01-03 NOTE — PROGRESS NOTES
MERT Cai  Attending, Orthopaedic Surgery  Foot and [address] [address] [facility]      ORTHOPAEDIC FOOT AND ANKLE CLINIC VISIT     Assessment:     Encounter Diagnoses   Name Primary?  Stress fracture of right calcaneus Yes    Metatarsalgia of right foot             Plan:   · The patient verbalized understanding of exam findings and treatment plan  We engaged in the shared decision-making process and treatment options were discussed at length with the patient  Surgical and conservative management discussed today along with risks and benefits  · She had an aggressive session in therapy approximately 10 days ago that she has been paying for ever since  I told her to take it easy over the next few days, Rest, Ice, elevation and NSAIDs as needed until this passes and gets back on track  · We will see her again in 2 months          History of Present Illness:   Chief Complaint:   Chief Complaint   Patient presents with   Roxanaclaudia Ervin is a 60 y o  female who is being seen in follow-up for Right foot pain  When we last saw she we recommended PT and weaning of the boot  She did very well until 10 days ago when she had a very strenuous PT session which she has paid for ever since   Pain had improved prior to that  Residual pain is localized at lateral dorsal midfoot with minimal radiating and described as sharp and severe  Pain/symptom timing:  Worse during the day when active  Pain/symptom context:  Worse with activites and work  Pain/symptom modifying factors:  Rest makes better, activities make worse  Pain/symptom associated signs/symptoms: none    Prior treatment   · NSAIDsYes   · Injections Yes   · Bracing/Orthotics Yes    · Physical Therapy Yes     Orthopedic Surgical History:   None    Past Medical, Surgical and Social History:  Past Medical History:  has a past medical history of Nephrolithiasis;  Sessile colonic polyp; and TMJ (temporomandibular joint disorder)  Problem List:  does not have any pertinent problems on file  Past Surgical History:  has a past surgical history that includes Appendectomy; Colonoscopy; Colposcopy; and Mandible fracture surgery  Family History: family history includes Stroke in her father  Social History:  reports that she has been smoking  She has been smoking about 0 50 packs per day  She has never used smokeless tobacco  She reports that she drinks alcohol  She reports that she does not use drugs  Current Medications: has a current medication list which includes the following prescription(s): aspirin, calcium citrate, cholecalciferol, and multivitamin  Allergies: is allergic to cefaclor; cephalexin; egg-pro  [compleat]; augmentin [amoxicillin-pot clavulanate]; and avelox [moxifloxacin]  Review of Systems:  General- denies fever/chills  HEENT- denies hearing loss or sore throat  Eyes- denies eye pain or visual disturbances, denies red eyes  Respiratory- denies cough or SOB  Cardio- denies chest pain or palpitations  GI- denies abdominal pain  Endocrine- denies urinary frequency  Urinary- denies pain with urination  Musculoskeletal- Negative except noted above  Skin- denies rashes or wounds  Neurological- denies dizziness or headache  Psychiatric- denies anxiety or difficulty concentrating    Physical Exam:   /85 (BP Location: Right arm, Patient Position: Sitting, Cuff Size: Adult)   Pulse 76   Ht 5' (1 524 m)   Wt 44 7 kg (98 lb 9 6 oz)   BMI 19 26 kg/m²   General/Constitutional: No apparent distress: well-nourished and well developed    Eyes: normal ocular motion  Lymphatic: No appreciable lymphadenopathy  Respiratory: Non-labored breathing  Vascular: No edema, swelling or tenderness, except as noted in detailed exam   Integumentary: No impressive skin lesions present, except as noted in detailed exam   Neuro: No ataxia or tremors noted  Psych: Normal mood and affect, oriented to person, place and time  Appropriate affect  Musculoskeletal: Normal, except as noted in detailed exam and in HPI  Examination    Right    Gait Normal   Musculoskeletal Tender to palpation at dorsal midfoot    Skin Normal       Nails Normal    Range of Motion  20 degrees dorsiflexion, 30 degrees plantarflexion  Subtalar motion: normal    Stability Stable    Muscle Strength 5/5 tibialis anterior  5/5 gastrocnemius-soleus  5/5 posterior tibialis  5/5 peroneal/eversion strength  5/5 EHL  5/5 FHL    Neurologic Normal    Sensation Intact to light touch throughout sural, saphenous, superficial peroneal, deep peroneal and medial/lateral plantar nerve distributions  [place] 5 07 filament (10g) testing deferred  Cardiovascular Brisk capillary refill < 2 seconds,intact DP and PT pulses    Special Tests None      Imaging Studies:   No new imaging        James R Lachman, MD  Foot & Ankle Surgery   Department of [address] [place] [address]      I personally performed the service  Fern Como Lachman, MD

## 2019-01-08 ENCOUNTER — APPOINTMENT (OUTPATIENT)
Dept: PHYSICAL THERAPY | Facility: REHABILITATION | Age: 59
End: 2019-01-08
Payer: COMMERCIAL

## 2019-01-10 ENCOUNTER — OFFICE VISIT (OUTPATIENT)
Dept: PHYSICAL THERAPY | Facility: REHABILITATION | Age: 59
End: 2019-01-10
Payer: COMMERCIAL

## 2019-01-10 DIAGNOSIS — M77.41 METATARSALGIA OF RIGHT FOOT: ICD-10-CM

## 2019-01-10 DIAGNOSIS — M84.374A STRESS FRACTURE OF RIGHT CALCANEUS: Primary | ICD-10-CM

## 2019-01-10 PROCEDURE — 97110 THERAPEUTIC EXERCISES: CPT | Performed by: PHYSICAL THERAPIST

## 2019-01-10 PROCEDURE — 97140 MANUAL THERAPY 1/> REGIONS: CPT | Performed by: PHYSICAL THERAPIST

## 2019-01-10 NOTE — PROGRESS NOTES
Daily Note     Today's date: 1/10/2019  Patient name: Stanford Avitia  : 1960  MRN: [id number]  Referring provider: Marysol Jennings MD  Dx:   Encounter Diagnosis     ICD-10-CM    1  Stress fracture of right calcaneus M84 374A    2  Metatarsalgia of right foot M77 41        Start Time: 1050  Stop Time: 1130  Total time in clinic (min): 40 minutes    Subjective: Pt reports improvements, "finally"  Objective: See treatment diary below  Assessment: Tolerated treatment well   Patient exhibited good technique with therapeutic exercises      Plan: re-eval next visit with expected d/c      Precautions: osteoporosis    Daily Treatment Diary     Manual  12/06 12/11 12/13 12/18 12/20 12/27 1/3 01/10     Prone - tissue deformation - soleus  LMR LMR LMR LMR LMR JM LMR     Prone - tissue deformation - gastroc  LMR LMR LMR LMR   LMR     Supine - gr 2 calcaneal fig-8 mobs  LMR LMR LMR LMR   LMR     Supine - tissue deformation - peroneals  LMR           Gr 4 post TCJ glide  LMR LMR LMR 4 & 5 - LMR LMR       Gr 4 plantar glide mid-foot     LMR LMR       Prone gr 4 post TCJ glide      LMR  LMR                                              Exercise Diary  12/06 12/11 12/13 12/18 12/20 12/27 1/3 01/10     gastroc stretch - towel 1'x3 hep           Seated - HR 2x25 hep           Ankle ABC's 30            Bike - arom     L1 - 5' L1 - 6' L1 - 6'      VG - HR  L3 - 4'  L6 - fatiguex5 L6 - 5' nv  L7 - 3x9     biodex             rocker board - arom  nv Stand - 2'          VG - squats  L4 - 5' L4 - 5'          Gait training  nv 3'          gastroc stretch - wall   1'x3 Slant board - 2'x2 1'x2   5'     Soleus stretch - wall   1'x3 Slant board - 2'x2 1'x2 5' 5' 5'     Rocker board - balance   50          Pro stretch     3'        Slant board - gastroc stretch      5' 5' 5'     Biodex       PC 2'  LOS 2'  Random2'                                                                         Modalities

## 2019-01-15 ENCOUNTER — APPOINTMENT (OUTPATIENT)
Dept: PHYSICAL THERAPY | Facility: REHABILITATION | Age: 59
End: 2019-01-15
Payer: COMMERCIAL

## 2019-01-17 ENCOUNTER — OFFICE VISIT (OUTPATIENT)
Dept: PHYSICAL THERAPY | Facility: REHABILITATION | Age: 59
End: 2019-01-17
Payer: COMMERCIAL

## 2019-01-17 DIAGNOSIS — M84.374A STRESS FRACTURE OF RIGHT CALCANEUS: Primary | ICD-10-CM

## 2019-01-17 DIAGNOSIS — M77.41 METATARSALGIA OF RIGHT FOOT: ICD-10-CM

## 2019-01-17 PROCEDURE — 97140 MANUAL THERAPY 1/> REGIONS: CPT

## 2019-01-17 PROCEDURE — 97110 THERAPEUTIC EXERCISES: CPT

## 2019-01-17 NOTE — PROGRESS NOTES
Daily Note     Today's date: 2019  Patient name: Alexandra Mathew  : 1960  MRN: [id number]  Referring provider: Gopi Galindo MD  Dx:   Encounter Diagnosis     ICD-10-CM    1  Stress fracture of right calcaneus M84 374A    2  Metatarsalgia of right foot M77 41                   Subjective: Pt notes that she has been feeling good lately but noticed increased pain on the top of her R foot yesterday when she walked at times  Notes that her heel has been feeling good lately  Objective: See treatment diary below  Assessment: Tolerated treatment well  Pt was able to increase gastroc and soleus mobility post self stretching and tissue deformation  She still had pain with weight bearing when she walked on the top of her foot  Patient exhibited good technique with therapeutic exercises  Ended on the bike today        Plan: re-eval next visit with expected d/c      Precautions: osteoporosis    Daily Treatment Diary     Manual  12/06 12/11 12/13 12/18 12/20 12/27 1/3 01/10 1/17    Prone - tissue deformation - soleus  LMR LMR LMR LMR LMR JM LMR MM    Prone - tissue deformation - gastroc  LMR LMR LMR LMR   LMR MM    Supine - gr 2 calcaneal fig-8 mobs  LMR LMR LMR LMR   LMR     Supine - tissue deformation - peroneals  LMR           Gr 4 post TCJ glide  LMR LMR LMR 4 & 5 - LMR LMR       Gr 4 plantar glide mid-foot     LMR LMR       Prone gr 4 post TCJ glide      LMR  LMR                                              Exercise Diary  12/06 12/11 12/13 12/18 12/20 12/27 1/3 01/10 1/17    gastroc stretch - towel 1'x3 hep           Seated - HR 2x25 hep           Ankle ABC's 30            Bike - arom     L1 - 5' L1 - 6' L1 - 6'  L1- 6'    VG - HR  L3 - 4'  L6 - fatiguex5 L6 - 5' nv  L7 - 3x9 L7 3x9    biodex             rocker board - arom  nv Stand - 2'          VG - squats  L4 - 5' L4 - 5'          Gait training  nv 3'          gastroc stretch - wall   1'x3 Slant board - 2'x2 1'x2   5' 5'    Soleus stretch - wall   1'x3 Slant board - 2'x2 1'x2 5' 5' 5' 5'    Rocker board - balance   50          Pro stretch     3'        Slant board - gastroc stretch      5' 5' 5' 5'    Biodex       PC 2'  LOS 2'  Random2'                                                                         Modalities

## 2019-01-22 ENCOUNTER — EVALUATION (OUTPATIENT)
Dept: PHYSICAL THERAPY | Facility: REHABILITATION | Age: 59
End: 2019-01-22
Payer: COMMERCIAL

## 2019-01-22 DIAGNOSIS — M84.374A STRESS FRACTURE OF RIGHT CALCANEUS: Primary | ICD-10-CM

## 2019-01-22 DIAGNOSIS — M77.41 METATARSALGIA OF RIGHT FOOT: ICD-10-CM

## 2019-01-22 PROCEDURE — 97110 THERAPEUTIC EXERCISES: CPT | Performed by: PHYSICAL THERAPIST

## 2019-01-22 PROCEDURE — 97140 MANUAL THERAPY 1/> REGIONS: CPT | Performed by: PHYSICAL THERAPIST

## 2019-01-22 NOTE — PROGRESS NOTES
PT Re-Evaluation     Today's date: 2019  Patient name: Prem Sloan  : 1960  MRN: [id number]  Referring provider: Cash Marinelli MD  Dx:   Encounter Diagnosis     ICD-10-CM    1  Stress fracture of right calcaneus M84 374A    2  Metatarsalgia of right foot M77 41        Start Time: 1020  Stop Time: 1100  Total time in clinic (min): 40 minutes     Pt has attended 9 sessions of PT since 18  Assessment/Plan    Subjective     She continues to c/o limitations with gait due to dorsal mid-foot pain and swelling  She denies any heel pain  Objective    AROM  Right Ankle/Foot   Dorsiflexion (ke): 8 degrees   Dorsiflexion (kf): 3 degrees   Great toe extension: WFL    MMT  Right Ankle/Foot   Dorsiflexion: WFL  Inversion: WFL  Eversion: WFL    PALPATION  TTP along the base of the 2nd metatarsal and the middle cuneiform    FOTO has increased from 41 to 47 indicating improvement       Goals  ST - I with HEP - MET  2 - right ankle df arom > 5 degrees - MET  LT - FOTO > 61 - NOT MET  2 - amb community distances without ankle pain - NOT MET     Precautions: osteoporosis    Daily Treatment Diary     Manual  12/06 12/11 12/13 12/18 12/20 12/27 1/3 01/10 1/17 01/22   Prone - tissue deformation - soleus  LMR LMR LMR LMR LMR JM LMR MM    Prone - tissue deformation - gastroc  LMR LMR LMR LMR   LMR MM    Supine - gr 2 calcaneal fig-8 mobs  LMR LMR LMR LMR   LMR     Supine - tissue deformation - peroneals  LMR           Gr 4 post TCJ glide  LMR LMR LMR 4 & 5 - LMR LMR       Gr 4 plantar glide mid-foot     LMR LMR    LMR   Prone gr 4 post TCJ glide      LMR  LMR     Gr 4 cuneiform plantar glide          LMR                Re-eval          LMR     Exercise Diary  12/06 12/11 12/13 12/18 12/20 12/27 1/3 01/10 1/17 01/22   gastroc stretch - towel 1'x3 hep           Seated - HR 2x25 hep           Ankle ABC's 30            Bike - arom     L1 - 5' L1 - 6' L1 - 6'  L1- 6'    VG - HR  L3 - 4'  L6 - fatiguex5 L6 - 5' nv  L7 - 3x9 L7 3x9    biodex             rocker board - arom  nv Stand - 2'          VG - squats  L4 - 5' L4 - 5'          Gait training  nv 3'          gastroc stretch - wall   1'x3 Slant board - 2'x2 1'x2   5' 5' hep   Soleus stretch - wall   1'x3 Slant board - 2'x2 1'x2 5' 5' 5' 5' hep   Rocker board - balance   50          Pro stretch     3'        Slant board - gastroc stretch      5' 5' 5' 5' 5'   Biodex       PC 2'  LOS 2'  Random2'      Towel curls          verbal   SLS on foam          45"x3   HR (up on 2, down on 1)          2x9   HEP update          LMR                  Modalities

## 2019-01-24 ENCOUNTER — APPOINTMENT (OUTPATIENT)
Dept: PHYSICAL THERAPY | Facility: REHABILITATION | Age: 59
End: 2019-01-24
Payer: COMMERCIAL

## 2019-01-29 ENCOUNTER — APPOINTMENT (OUTPATIENT)
Dept: PHYSICAL THERAPY | Facility: REHABILITATION | Age: 59
End: 2019-01-29
Payer: COMMERCIAL

## 2019-01-31 ENCOUNTER — APPOINTMENT (OUTPATIENT)
Dept: PHYSICAL THERAPY | Facility: REHABILITATION | Age: 59
End: 2019-01-31
Payer: COMMERCIAL

## 2019-06-04 ENCOUNTER — OFFICE VISIT (OUTPATIENT)
Dept: FAMILY MEDICINE CLINIC | Facility: CLINIC | Age: 59
End: 2019-06-04
Payer: COMMERCIAL

## 2019-06-04 VITALS
OXYGEN SATURATION: 97 % | DIASTOLIC BLOOD PRESSURE: 74 MMHG | TEMPERATURE: 99.8 F | SYSTOLIC BLOOD PRESSURE: 136 MMHG | WEIGHT: 99.8 LBS | BODY MASS INDEX: 19.59 KG/M2 | HEIGHT: 60 IN | HEART RATE: 69 BPM

## 2019-06-04 DIAGNOSIS — J01.00 ACUTE NON-RECURRENT MAXILLARY SINUSITIS: Primary | ICD-10-CM

## 2019-06-04 PROCEDURE — 99214 OFFICE O/P EST MOD 30 MIN: CPT | Performed by: FAMILY MEDICINE

## 2019-06-04 PROCEDURE — 3008F BODY MASS INDEX DOCD: CPT | Performed by: FAMILY MEDICINE

## 2019-06-04 RX ORDER — PREDNISONE 10 MG/1
TABLET ORAL
Qty: 40 TABLET | Refills: 0 | Status: SHIPPED | OUTPATIENT
Start: 2019-06-04 | End: 2019-11-12 | Stop reason: ALTCHOICE

## 2019-06-04 RX ORDER — BENZONATATE 200 MG/1
200 CAPSULE ORAL 3 TIMES DAILY PRN
Qty: 20 CAPSULE | Refills: 0 | Status: SHIPPED | OUTPATIENT
Start: 2019-06-04 | End: 2019-11-12 | Stop reason: ALTCHOICE

## 2019-06-04 RX ORDER — DOXYCYCLINE 100 MG/1
100 CAPSULE ORAL 2 TIMES DAILY
Qty: 20 CAPSULE | Refills: 0 | Status: SHIPPED | OUTPATIENT
Start: 2019-06-04 | End: 2019-06-14

## 2019-09-25 ENCOUNTER — TELEPHONE (OUTPATIENT)
Dept: FAMILY MEDICINE CLINIC | Facility: CLINIC | Age: 59
End: 2019-09-25

## 2019-09-25 DIAGNOSIS — Z13.0 SCREENING, ANEMIA, DEFICIENCY, IRON: ICD-10-CM

## 2019-09-25 DIAGNOSIS — Z11.59 ENCOUNTER FOR HEPATITIS C SCREENING TEST FOR LOW RISK PATIENT: ICD-10-CM

## 2019-09-25 DIAGNOSIS — E78.2 MIXED HYPERLIPIDEMIA: Primary | ICD-10-CM

## 2019-09-25 DIAGNOSIS — Z13.1 SCREENING FOR DIABETES MELLITUS: ICD-10-CM

## 2019-09-25 NOTE — TELEPHONE ENCOUNTER
ATTEMPT #1    Please call the patient and schedule a routine physical/health maintenance visit  I ordered routine blood work to [address] [place] [address]   The pt should have the blood work done prior to the visit

## 2019-10-04 ENCOUNTER — TELEPHONE (OUTPATIENT)
Dept: FAMILY MEDICINE CLINIC | Facility: CLINIC | Age: 59
End: 2019-10-04

## 2019-10-04 DIAGNOSIS — M85.89 OSTEOPENIA OF MULTIPLE SITES: Primary | ICD-10-CM

## 2019-10-04 NOTE — TELEPHONE ENCOUNTER
She is coming in for blood work she would also like to add a calcium level and a Vit D level   She would also like a magnesium level done also

## 2019-10-11 ENCOUNTER — TRANSCRIBE ORDERS (OUTPATIENT)
Dept: ADMINISTRATIVE | Facility: HOSPITAL | Age: 59
End: 2019-10-11

## 2019-10-11 DIAGNOSIS — Z12.31 ENCOUNTER FOR SCREENING MAMMOGRAM FOR BREAST CANCER: Primary | ICD-10-CM

## 2019-10-15 ENCOUNTER — ANNUAL EXAM (OUTPATIENT)
Dept: OBGYN CLINIC | Facility: CLINIC | Age: 59
End: 2019-10-15
Payer: COMMERCIAL

## 2019-10-15 VITALS
DIASTOLIC BLOOD PRESSURE: 70 MMHG | HEIGHT: 60 IN | WEIGHT: 99.4 LBS | BODY MASS INDEX: 19.52 KG/M2 | SYSTOLIC BLOOD PRESSURE: 140 MMHG

## 2019-10-15 DIAGNOSIS — Z12.31 ENCOUNTER FOR SCREENING MAMMOGRAM FOR BREAST CANCER: ICD-10-CM

## 2019-10-15 DIAGNOSIS — Z01.419 ENCOUNTER FOR ANNUAL ROUTINE GYNECOLOGICAL EXAMINATION: Primary | ICD-10-CM

## 2019-10-15 PROCEDURE — 99396 PREV VISIT EST AGE 40-64: CPT | Performed by: OBSTETRICS & GYNECOLOGY

## 2019-10-15 NOTE — PROGRESS NOTES
Assessment/Plan:    pap is up to date    Extremely dense breast tissue-I explained this  We discussed 3D mammogram and also possible additional screening with automated breast ultrasound  She was given information on this  mammogram reviewed  RX given     Discussed self breast exams    colon cancer screening-she has colonoscopy every 5 years and thinks she is due either in 2020 or 2021    Osteoporosis  in total hip, she has discussed this with a rheumatologist   She was considering prolia  I reviewed weight bearing and muscle strengthening exercises, Ca and Vit D  I strongly recommended she stop smoking  discussed preventive care, regular exercise and a healthy diet  Discussed smoking cessation as this contributes to her osteoporosis  No problem-specific Assessment & Plan notes found for this encounter  Diagnoses and all orders for this visit:    Encounter for annual routine gynecological examination    Encounter for screening mammogram for breast cancer  -     Mammo screening bilateral w 3d & cad; Future          Subjective:      Patient ID: Tran Aguilera is a 60 y o  female  Pt here for yearly  She has no gyn complaints  She had two fractures in her right foot and a DEXA was ordered  This showed osteoporosis  Normal pap and negative HPV in 2017    Mammogram due later this month, she has extremely dense tissue, average risk  She continues to smoke  The following portions of the patient's history were reviewed and updated as appropriate: allergies, current medications, past family history, past medical history, past social history, past surgical history and problem list     Review of Systems   Constitutional: Negative  Gastrointestinal: Negative  Genitourinary: Negative            Objective:      /70 (BP Location: Left arm, Patient Position: Sitting, Cuff Size: Standard)   Ht 4' 9 5" (1 461 m)   Wt 45 1 kg (99 lb 6 4 oz)   BMI 21 14 kg/m²          Physical Exam   Constitutional: She appears well-developed  Neck: No tracheal deviation present  No thyromegaly present  Cardiovascular: Normal rate and regular rhythm  Pulmonary/Chest: Effort normal and breath sounds normal  Right breast exhibits no inverted nipple, no mass, no nipple discharge, no skin change and no tenderness  Left breast exhibits no inverted nipple, no mass, no nipple discharge, no skin change and no tenderness  Breasts are symmetrical    Examined seated and supine   Abdominal: Soft  She exhibits no distension and no mass  There is no tenderness  Genitourinary: Rectum normal, vagina normal and uterus normal  No labial fusion  There is no rash, tenderness, lesion or injury on the right labia  There is no rash, tenderness, lesion or injury on the left labia  Cervix exhibits no motion tenderness, no discharge and no friability  Right adnexum displays no mass, no tenderness and no fullness  Left adnexum displays no mass, no tenderness and no fullness  Vitals reviewed

## 2019-10-22 ENCOUNTER — CLINICAL SUPPORT (OUTPATIENT)
Dept: FAMILY MEDICINE CLINIC | Facility: CLINIC | Age: 59
End: 2019-10-22
Payer: COMMERCIAL

## 2019-10-22 DIAGNOSIS — E78.2 MIXED HYPERLIPIDEMIA: ICD-10-CM

## 2019-10-22 DIAGNOSIS — Z13.0 SCREENING, ANEMIA, DEFICIENCY, IRON: ICD-10-CM

## 2019-10-22 DIAGNOSIS — M85.89 OSTEOPENIA OF MULTIPLE SITES: Primary | ICD-10-CM

## 2019-10-22 DIAGNOSIS — Z11.59 ENCOUNTER FOR HEPATITIS C SCREENING TEST FOR LOW RISK PATIENT: ICD-10-CM

## 2019-10-22 DIAGNOSIS — Z13.1 SCREENING FOR DIABETES MELLITUS: ICD-10-CM

## 2019-10-22 PROCEDURE — 36415 COLL VENOUS BLD VENIPUNCTURE: CPT

## 2019-10-23 LAB
25(OH)D3 SERPL-MCNC: 78 NG/ML (ref 30–100)
ALBUMIN SERPL-MCNC: 4.1 G/DL (ref 3.6–5.1)
ALBUMIN/GLOB SERPL: 2 (CALC) (ref 1–2.5)
ALP SERPL-CCNC: 69 U/L (ref 33–130)
ALT SERPL-CCNC: 16 U/L (ref 6–29)
AST SERPL-CCNC: 20 U/L (ref 10–35)
BASOPHILS # BLD AUTO: 49 CELLS/UL (ref 0–200)
BASOPHILS NFR BLD AUTO: 1 %
BILIRUB SERPL-MCNC: 0.4 MG/DL (ref 0.2–1.2)
BUN SERPL-MCNC: 17 MG/DL (ref 7–25)
BUN/CREAT SERPL: NORMAL (CALC) (ref 6–22)
CALCIUM SERPL-MCNC: 9.6 MG/DL (ref 8.6–10.4)
CHLORIDE SERPL-SCNC: 105 MMOL/L (ref 98–110)
CHOLEST SERPL-MCNC: 248 MG/DL
CHOLEST/HDLC SERPL: 3.1 (CALC)
CO2 SERPL-SCNC: 24 MMOL/L (ref 20–32)
CREAT SERPL-MCNC: 0.88 MG/DL (ref 0.5–0.99)
EOSINOPHIL # BLD AUTO: 137 CELLS/UL (ref 15–500)
EOSINOPHIL NFR BLD AUTO: 2.8 %
ERYTHROCYTE [DISTWIDTH] IN BLOOD BY AUTOMATED COUNT: 12.8 % (ref 11–15)
GLOBULIN SER CALC-MCNC: 2.1 G/DL (CALC) (ref 1.9–3.7)
GLUCOSE SERPL-MCNC: 92 MG/DL (ref 65–99)
HCT VFR BLD AUTO: 47.1 % (ref 35–45)
HCV AB S/CO SERPL IA: 0.01
HCV AB SERPL QL IA: NORMAL
HDLC SERPL-MCNC: 81 MG/DL
HGB BLD-MCNC: 15.9 G/DL (ref 11.7–15.5)
LDLC SERPL CALC-MCNC: 148 MG/DL (CALC)
LYMPHOCYTES # BLD AUTO: 1519 CELLS/UL (ref 850–3900)
LYMPHOCYTES NFR BLD AUTO: 31 %
MAGNESIUM SERPL-MCNC: 2 MG/DL (ref 1.5–2.5)
MCH RBC QN AUTO: 31.8 PG (ref 27–33)
MCHC RBC AUTO-ENTMCNC: 33.8 G/DL (ref 32–36)
MCV RBC AUTO: 94.2 FL (ref 80–100)
MONOCYTES # BLD AUTO: 568 CELLS/UL (ref 200–950)
MONOCYTES NFR BLD AUTO: 11.6 %
NEUTROPHILS # BLD AUTO: 2626 CELLS/UL (ref 1500–7800)
NEUTROPHILS NFR BLD AUTO: 53.6 %
NONHDLC SERPL-MCNC: 167 MG/DL (CALC)
PLATELET # BLD AUTO: 262 THOUSAND/UL (ref 140–400)
PMV BLD REES-ECKER: 9.2 FL (ref 7.5–12.5)
POTASSIUM SERPL-SCNC: 4.4 MMOL/L (ref 3.5–5.3)
PROT SERPL-MCNC: 6.2 G/DL (ref 6.1–8.1)
RBC # BLD AUTO: 5 MILLION/UL (ref 3.8–5.1)
SL AMB EGFR AFRICAN AMERICAN: 83 ML/MIN/1.73M2
SL AMB EGFR NON AFRICAN AMERICAN: 71 ML/MIN/1.73M2
SODIUM SERPL-SCNC: 140 MMOL/L (ref 135–146)
TRIGL SERPL-MCNC: 89 MG/DL
TSH SERPL-ACNC: 1.1 MIU/L (ref 0.4–4.5)
WBC # BLD AUTO: 4.9 THOUSAND/UL (ref 3.8–10.8)

## 2019-11-12 ENCOUNTER — TELEPHONE (OUTPATIENT)
Dept: FAMILY MEDICINE CLINIC | Facility: CLINIC | Age: 59
End: 2019-11-12

## 2019-11-12 ENCOUNTER — OFFICE VISIT (OUTPATIENT)
Dept: FAMILY MEDICINE CLINIC | Facility: CLINIC | Age: 59
End: 2019-11-12
Payer: COMMERCIAL

## 2019-11-12 VITALS
TEMPERATURE: 99.2 F | HEIGHT: 60 IN | HEART RATE: 66 BPM | SYSTOLIC BLOOD PRESSURE: 140 MMHG | BODY MASS INDEX: 19.39 KG/M2 | WEIGHT: 98.75 LBS | OXYGEN SATURATION: 98 % | DIASTOLIC BLOOD PRESSURE: 78 MMHG

## 2019-11-12 DIAGNOSIS — J01.00 ACUTE NON-RECURRENT MAXILLARY SINUSITIS: Primary | ICD-10-CM

## 2019-11-12 PROCEDURE — 99214 OFFICE O/P EST MOD 30 MIN: CPT | Performed by: FAMILY MEDICINE

## 2019-11-12 RX ORDER — DOXYCYCLINE HYCLATE 100 MG/1
100 CAPSULE ORAL EVERY 12 HOURS SCHEDULED
Qty: 20 CAPSULE | Refills: 0 | Status: SHIPPED | OUTPATIENT
Start: 2019-11-12 | End: 2019-11-22

## 2019-11-12 RX ORDER — PREDNISONE 10 MG/1
TABLET ORAL
Qty: 20 TABLET | Refills: 0 | Status: SHIPPED | OUTPATIENT
Start: 2019-11-12 | End: 2020-09-17

## 2019-11-12 NOTE — TELEPHONE ENCOUNTER
Pt wanted to know why you ordered the Doxycycline Hyclate instead of Mono? She already picked up the medication  But the price was a big difference

## 2019-11-12 NOTE — TELEPHONE ENCOUNTER
No specific reason    I was unaware that there was a price difference between monohydrate and hyclate

## 2019-11-12 NOTE — PROGRESS NOTES
Subjective:   Chief Complaint   Patient presents with    URI     PT IS HERE FOR URI SX'S SINCE SATURDAY   SINUS SX'S , CHEST CONGESTION AND SCRATCHY THROAT  Patient ID: Gaston Samano is a 60 y o  female  The pt is here because she has been sick for 4 days  + sinus pain/pressure  + ear pain and pressure  + cough  + nasal congestion  + headaches  + PND  + sore throat  No fevers        The following portions of the patient's history were reviewed and updated as appropriate: allergies, current medications, past family history, past medical history, past social history, past surgical history and problem list     Review of Systems        Tobacco Cessation Counseling: Tobacco cessation counseling was provided  The patient is sincerely urged to quit consumption of tobacco  She is ready to quit tobacco        Objective:  Vitals:    11/12/19 1105   BP: 140/78   Pulse: 66   Temp: 99 2 °F (37 3 °C)   SpO2: 98%   Weight: 44 8 kg (98 lb 12 oz)   Height: 5' (1 524 m)      Physical Exam   Constitutional: She is oriented to person, place, and time  Vital signs are normal  She appears well-developed and well-nourished  She appears ill  HENT:   Head: Normocephalic and atraumatic  Right Ear: Tympanic membrane and external ear normal    Left Ear: Tympanic membrane and external ear normal    Nose: Mucosal edema present  No rhinorrhea or sinus tenderness  Right sinus exhibits maxillary sinus tenderness  Right sinus exhibits no frontal sinus tenderness  Left sinus exhibits maxillary sinus tenderness  Left sinus exhibits no frontal sinus tenderness  Mouth/Throat: Mucous membranes are normal  Posterior oropharyngeal erythema present  No oropharyngeal exudate, posterior oropharyngeal edema or tonsillar abscesses  Eyes: Conjunctivae and lids are normal    Pulmonary/Chest: Effort normal and breath sounds normal    Lymphadenopathy:     She has cervical adenopathy     Neurological: She is alert and oriented to person, place, and time  Skin: Skin is warm, dry and intact  Psychiatric: She has a normal mood and affect  Thought content normal          Assessment/Plan:    No problem-specific Assessment & Plan notes found for this encounter  Diagnoses and all orders for this visit:    Acute non-recurrent maxillary sinusitis  -     doxycycline hyclate (VIBRAMYCIN) 100 mg capsule; Take 1 capsule (100 mg total) by mouth every 12 (twelve) hours for 10 days  -     predniSONE 10 mg tablet; 4 tabs daily x2 days, 3 tabs daily x2 days, 2 tabs daily x 2 days, 1 tab daily x2 days  -     budesonide (RINOCORT AQUA) 32 MCG/ACT nasal spray; 1 spray into each nostril daily        At the point but the patient I believe that she probably has a viral upper respiratory infection  She is very prone to developing sinusitis, though  I am going to give her both prednisone and an antibiotic  I advised she could start the prednisone now she would like and add the antibiotic within a few days if she feels the symptoms are worsening  She has already started her Rhinocort and has requested a refill of this

## 2019-11-21 ENCOUNTER — HOSPITAL ENCOUNTER (OUTPATIENT)
Dept: BONE DENSITY | Facility: IMAGING CENTER | Age: 59
Discharge: HOME | End: 2019-11-21
Payer: COMMERCIAL

## 2019-11-21 VITALS — WEIGHT: 100 LBS | BODY MASS INDEX: 19.63 KG/M2 | HEIGHT: 60 IN

## 2019-11-21 DIAGNOSIS — Z12.31 ENCOUNTER FOR SCREENING MAMMOGRAM FOR BREAST CANCER: ICD-10-CM

## 2019-11-21 PROCEDURE — 77067 SCR MAMMO BI INCL CAD: CPT

## 2019-11-21 PROCEDURE — 77063 BREAST TOMOSYNTHESIS BI: CPT

## 2020-09-17 ENCOUNTER — TELEMEDICINE (OUTPATIENT)
Dept: FAMILY MEDICINE CLINIC | Facility: CLINIC | Age: 60
End: 2020-09-17
Payer: COMMERCIAL

## 2020-09-17 VITALS — TEMPERATURE: 97.5 F

## 2020-09-17 DIAGNOSIS — J01.00 ACUTE NON-RECURRENT MAXILLARY SINUSITIS: ICD-10-CM

## 2020-09-17 DIAGNOSIS — J30.2 SEASONAL ALLERGIC RHINITIS, UNSPECIFIED TRIGGER: ICD-10-CM

## 2020-09-17 DIAGNOSIS — Z13.29 SCREENING FOR THYROID DISORDER: ICD-10-CM

## 2020-09-17 DIAGNOSIS — E78.2 MIXED HYPERLIPIDEMIA: ICD-10-CM

## 2020-09-17 DIAGNOSIS — Z13.1 SCREENING FOR DIABETES MELLITUS: ICD-10-CM

## 2020-09-17 DIAGNOSIS — Z13.0 SCREENING, ANEMIA, DEFICIENCY, IRON: Primary | ICD-10-CM

## 2020-09-17 PROCEDURE — 99214 OFFICE O/P EST MOD 30 MIN: CPT | Performed by: FAMILY MEDICINE

## 2020-09-17 PROCEDURE — 3725F SCREEN DEPRESSION PERFORMED: CPT | Performed by: FAMILY MEDICINE

## 2020-09-17 PROCEDURE — 4004F PT TOBACCO SCREEN RCVD TLK: CPT | Performed by: FAMILY MEDICINE

## 2020-09-17 RX ORDER — PREDNISONE 10 MG/1
TABLET ORAL
Qty: 40 TABLET | Refills: 0 | Status: SHIPPED | OUTPATIENT
Start: 2020-09-17 | End: 2021-07-26 | Stop reason: ALTCHOICE

## 2020-09-17 RX ORDER — DOXYCYCLINE HYCLATE 100 MG/1
100 CAPSULE ORAL EVERY 12 HOURS SCHEDULED
Qty: 20 CAPSULE | Refills: 0 | Status: SHIPPED | OUTPATIENT
Start: 2020-09-17 | End: 2020-09-27

## 2020-09-17 NOTE — PROGRESS NOTES
Virtual Regular Visit      Assessment/Plan:    Problem List Items Addressed This Visit        Other    Hyperlipidemia    Relevant Orders    Lipid panel      Other Visit Diagnoses     Screening, anemia, deficiency, iron    -  Primary    Relevant Orders    CBC and differential    Screening for thyroid disorder        Relevant Orders    TSH, 3rd generation with Free T4 reflex    Screening for diabetes mellitus        Relevant Orders    Comprehensive metabolic panel    Seasonal allergic rhinitis, unspecified trigger        Relevant Medications    Rhinocort Aqua 32 MCG/ACT nasal spray    predniSONE 10 mg tablet    Acute non-recurrent maxillary sinusitis        Relevant Medications    predniSONE 10 mg tablet    doxycycline hyclate (VIBRAMYCIN) 100 mg capsule        I am going to send the patient prednisone and doxycycline which is what she generally takes for her sinusitis  I definitely agree with her that she knows when she should and should not take it  She will almost certainly get a sinus infection at some point during the fall as is her typical routine  I did refill the Rhinocort and we will e-mail her the prescription so that she can get it filled at the [facility]  She is due for a physical in October  Her labs have been ordered and she has a physical scheduled  Reason for visit is   Chief Complaint   Patient presents with    Sinusitis     Pt calling because she thinks she has a sinus infection  Started with a sore throat on Monday as well as congestion and left ear pain  Pt is using her nose spray and it is helping  Pt scheduled for FBW on 10/22/2020 and is set up for her GYN appt   Virtual Regular Visit        Encounter provider Paris Escudero MD    Provider located at [address] [facility] [id number]      Recent Visits  No visits were found meeting these conditions     Showing recent visits within past 7 days and meeting all other requirements     Today's Visits  Date Type Provider Dept   09/17/20 Telemedicine MD Chandler Sainz   Showing today's visits and meeting all other requirements     Future Appointments  No visits were found meeting these conditions  Showing future appointments within next 150 days and meeting all other requirements        The patient was identified by name and date of birth  Michelle Skinner was informed that this is a telemedicine visit and that the visit is being conducted through [facility] and patient was informed that this is a secure, HIPAA-compliant platform  She agrees to proceed     My office door was closed  No one else was in the room  She acknowledged consent and understanding of privacy and security of the video platform  The patient has agreed to participate and understands they can discontinue the visit at any time  Patient is aware this is a billable service  Subjective  Michelle Skinner is a 63 y o  female         Patient is being seen virtually today because of her allergies/sinus symptoms  Started with sore throat on the left on Monday and was concerned that she was starting with sinusitis  Was not too bad and she decided to start taking her Rhinocort  Tuesday she was feeling much better after using the Rhinocort she has continued to do so  Was supposed to have her gradndauthers Tuesday and cancelled just in case  But now she is feeling significantly better, back to herself without any sore throat or postnasal drip  She denies sinus pain or pressure  She knows that at some point during the fall she will certainly developed a sinus infection  She had made the appointment so she was hoping that I could prescribe the antibiotic and prednisone that she generally gets when she has a sinus infection  She promises not to use it right now, she know she does not need it  She is thinking it would be beneficial to her to have on hand, though, as if she does develop symptoms for many days of sinusitis she feels she knows when she needs it and can take it  Additionally, she needs a refill of the Rhinocort Aqua  This is not covered in the [place] and she has been getting the prescription from a [address] [place] [address] [address]  She needs a printed prescription e-mail to her       Past Medical History:   Diagnosis Date    Nephrolithiasis     Sessile colonic polyp     last assessed 9/1/16    TMJ (temporomandibular joint disorder)        Past Surgical History:   Procedure Laterality Date    APPENDECTOMY      COLONOSCOPY      fiberoptic/screening - last assessed 9/2/14    COLPOSCOPY      cervix w/bx w/endocervical curettage     MANDIBLE FRACTURE SURGERY      jaw surgery - last assessed 9/2/14 - this was for the TMJ (Dr Christina Bourne)    TONSILLECTOMY         Current Outpatient Medications   Medication Sig Dispense Refill    aspirin 325 mg tablet Take 325 mg by mouth daily      CALCIUM CITRATE PO Take 500 mg by mouth      cholecalciferol (VITAMIN D3) 1,000 units tablet Take 1,000 Units by mouth daily      doxycycline hyclate (VIBRAMYCIN) 100 mg capsule Take 1 capsule (100 mg total) by mouth every 12 (twelve) hours for 10 days 20 capsule 0    Multiple Vitamin (MULTIVITAMIN) tablet Take 1 tablet by mouth daily      predniSONE 10 mg tablet 5 tabs daily x3 days, 4 tabs daily x2 days, 3 tabs daily x2 days, 2 tabs daily x 2 days, 1 tab daily x2 days 40 tablet 0    Rhinocort Aqua 32 MCG/ACT nasal spray 1 spray into each nostril daily 3 Bottle 3     No current facility-administered medications for this visit  The     Allergies   Allergen Reactions    Cefaclor Hives     Category: Allergy;     Cephalexin Nausea Only     Reaction Date: 28Apr2011; Category: Adverse Reaction;     Egg-Pro  [Compleat]     Augmentin [Amoxicillin-Pot Clavulanate] Rash and Hives     Reaction Date: [id number]; Category: Allergy;     Avelox [Moxifloxacin] Palpitations     Reaction Date: [id number]; Category:  Adverse Reaction; Review of Systems    Video Exam    Vitals:    09/17/20 1111   Temp: 97 5 °F (36 4 °C)   TempSrc: Tympanic       Physical Exam  Constitutional:       General: She is not in acute distress  Appearance: She is well-developed  She is not diaphoretic  HENT:      Head: Normocephalic  Eyes:      Conjunctiva/sclera: Conjunctivae normal    Neck:      Musculoskeletal: Normal range of motion  Pulmonary:      Effort: Pulmonary effort is normal  No respiratory distress  Skin:     General: Skin is warm and dry  Neurological:      General: No focal deficit present  Mental Status: She is alert and oriented to person, place, and time  Psychiatric:         Mood and Affect: Mood normal          Behavior: Behavior normal          Thought Content: Thought content normal          Judgment: Judgment normal           I spent 15 minutes directly with the patient during this visit      VIRTUAL VISIT DISCLAIMER    Abdiel Robles acknowledges that she has consented to an online visit or consultation  She understands that the online visit is based solely on information provided by her, and that, in the absence of a face-to-face physical evaluation by the physician, the diagnosis she receives is both limited and provisional in terms of accuracy and completeness  This is not intended to replace a full medical face-to-face evaluation by the physician  Abdiel Robles understands and accepts these terms

## 2020-10-20 ENCOUNTER — ANNUAL EXAM (OUTPATIENT)
Dept: OBGYN CLINIC | Facility: CLINIC | Age: 60
End: 2020-10-20
Payer: COMMERCIAL

## 2020-10-20 VITALS
BODY MASS INDEX: 19.24 KG/M2 | TEMPERATURE: 97.2 F | WEIGHT: 98 LBS | DIASTOLIC BLOOD PRESSURE: 82 MMHG | HEIGHT: 60 IN | SYSTOLIC BLOOD PRESSURE: 122 MMHG

## 2020-10-20 DIAGNOSIS — Z01.419 ENCOUNTER FOR ANNUAL ROUTINE GYNECOLOGICAL EXAMINATION: Primary | ICD-10-CM

## 2020-10-20 DIAGNOSIS — Z12.31 ENCOUNTER FOR SCREENING MAMMOGRAM FOR BREAST CANCER: ICD-10-CM

## 2020-10-20 DIAGNOSIS — Z78.0 ASYMPTOMATIC MENOPAUSE: ICD-10-CM

## 2020-10-20 DIAGNOSIS — M81.0 OSTEOPOROSIS WITHOUT CURRENT PATHOLOGICAL FRACTURE, UNSPECIFIED OSTEOPOROSIS TYPE: ICD-10-CM

## 2020-10-20 DIAGNOSIS — R19.04 LLQ ABDOMINAL MASS: ICD-10-CM

## 2020-10-20 PROCEDURE — 99396 PREV VISIT EST AGE 40-64: CPT | Performed by: OBSTETRICS & GYNECOLOGY

## 2020-10-23 ENCOUNTER — HOSPITAL ENCOUNTER (OUTPATIENT)
Dept: ULTRASOUND IMAGING | Facility: HOSPITAL | Age: 60
Discharge: HOME | End: 2020-10-23
Attending: OBSTETRICS & GYNECOLOGY
Payer: COMMERCIAL

## 2020-10-23 DIAGNOSIS — R19.04 LLQ ABDOMINAL MASS: ICD-10-CM

## 2020-10-23 PROCEDURE — 76830 TRANSVAGINAL US NON-OB: CPT

## 2020-10-23 PROCEDURE — 76856 US EXAM PELVIC COMPLETE: CPT

## 2020-10-29 ENCOUNTER — TELEPHONE (OUTPATIENT)
Dept: OBGYN CLINIC | Facility: CLINIC | Age: 60
End: 2020-10-29

## 2021-01-07 ENCOUNTER — HOSPITAL ENCOUNTER (OUTPATIENT)
Dept: BONE DENSITY | Facility: IMAGING CENTER | Age: 61
Discharge: HOME | End: 2021-01-07
Payer: COMMERCIAL

## 2021-01-07 ENCOUNTER — HOSPITAL ENCOUNTER (OUTPATIENT)
Dept: MAMMOGRAPHY | Facility: IMAGING CENTER | Age: 61
Discharge: HOME | End: 2021-01-07
Payer: COMMERCIAL

## 2021-01-07 VITALS — WEIGHT: 98 LBS | HEIGHT: 60 IN | BODY MASS INDEX: 19.24 KG/M2

## 2021-01-07 DIAGNOSIS — M81.0 OSTEOPOROSIS WITHOUT CURRENT PATHOLOGICAL FRACTURE, UNSPECIFIED OSTEOPOROSIS TYPE: ICD-10-CM

## 2021-01-07 DIAGNOSIS — Z12.31 ENCOUNTER FOR SCREENING MAMMOGRAM FOR BREAST CANCER: ICD-10-CM

## 2021-01-07 DIAGNOSIS — Z78.0 ASYMPTOMATIC MENOPAUSE: ICD-10-CM

## 2021-01-07 PROCEDURE — 77063 BREAST TOMOSYNTHESIS BI: CPT

## 2021-01-07 PROCEDURE — 77080 DXA BONE DENSITY AXIAL: CPT

## 2021-01-07 PROCEDURE — 77067 SCR MAMMO BI INCL CAD: CPT

## 2021-05-07 ENCOUNTER — TELEPHONE (OUTPATIENT)
Dept: FAMILY MEDICINE CLINIC | Facility: CLINIC | Age: 61
End: 2021-05-07

## 2021-05-07 NOTE — TELEPHONE ENCOUNTER
Health Maintenance Due   Topic Date Due   • Shingles Vaccine (2 of 3) 07/27/2016   • Medicare Wellness Visit  09/23/2020       Patient is due for topics as listed above but is not proceeding with Immunization(s) Shingles at this time.      Pt wants rhinocort aqua 32 mcg/act    She wants it printed out and she will   She is going to send to [facility]

## 2021-05-11 DIAGNOSIS — J30.2 SEASONAL ALLERGIC RHINITIS, UNSPECIFIED TRIGGER: ICD-10-CM

## 2021-05-21 DIAGNOSIS — J30.2 SEASONAL ALLERGIC RHINITIS, UNSPECIFIED TRIGGER: ICD-10-CM

## 2021-06-02 ENCOUNTER — TELEPHONE (OUTPATIENT)
Dept: FAMILY MEDICINE CLINIC | Facility: CLINIC | Age: 61
End: 2021-06-02

## 2021-06-02 NOTE — TELEPHONE ENCOUNTER
[address] calling regarding pt Rhinocort aqua nasal spray, pharmacy states it comes by doses is it ok ?    Call back # [phone] reference # [id number]

## 2021-06-02 NOTE — TELEPHONE ENCOUNTER
Pharmacy called stating they only have 120 dose in the rhino herman nasal herman , 15 ml was ordered, pharmacy want to know if the 120 dose is ok?    pharmacy number [phone]  Ref # [id number]

## 2021-07-19 ENCOUNTER — TELEPHONE (OUTPATIENT)
Dept: FAMILY MEDICINE CLINIC | Facility: CLINIC | Age: 61
End: 2021-07-19

## 2021-07-22 ENCOUNTER — TELEPHONE (OUTPATIENT)
Dept: FAMILY MEDICINE CLINIC | Facility: CLINIC | Age: 61
End: 2021-07-22

## 2021-07-22 NOTE — TELEPHONE ENCOUNTER
Patient is being treated for infectious colitis  She is seeing you in follow up from the ER on Tuesday  When she was at the ER they gave her Augmentin  She runs out on Saturday  Symptoms are getting better than they were  She only has a stomach ache occasionally  Some loose stools but stools are forming  Do you think she needs additional abx until she is seen? Just checking to make sure      Please advise patient

## 2021-07-27 ENCOUNTER — TELEMEDICINE (OUTPATIENT)
Dept: FAMILY MEDICINE CLINIC | Facility: CLINIC | Age: 61
End: 2021-07-27
Payer: COMMERCIAL

## 2021-07-27 ENCOUNTER — TELEPHONE (OUTPATIENT)
Dept: FAMILY MEDICINE CLINIC | Facility: CLINIC | Age: 61
End: 2021-07-27

## 2021-07-27 VITALS — HEIGHT: 60 IN | BODY MASS INDEX: 19.04 KG/M2 | TEMPERATURE: 98.4 F | WEIGHT: 97 LBS

## 2021-07-27 DIAGNOSIS — K52.9 COLITIS: Primary | ICD-10-CM

## 2021-07-27 PROCEDURE — 3008F BODY MASS INDEX DOCD: CPT | Performed by: FAMILY MEDICINE

## 2021-07-27 PROCEDURE — 4004F PT TOBACCO SCREEN RCVD TLK: CPT | Performed by: FAMILY MEDICINE

## 2021-07-27 PROCEDURE — 99214 OFFICE O/P EST MOD 30 MIN: CPT | Performed by: FAMILY MEDICINE

## 2021-07-27 RX ORDER — AMOXICILLIN AND CLAVULANATE POTASSIUM 875; 125 MG/1; MG/1
1 TABLET, FILM COATED ORAL EVERY 12 HOURS
COMMUNITY
Start: 2021-07-18 | End: 2021-07-27

## 2021-07-27 NOTE — TELEPHONE ENCOUNTER
She just spoke with you today and everything was going well  But new she just had 3 bouts of diarrhea  What should she do?      [phone]

## 2021-07-27 NOTE — PROGRESS NOTES
Virtual Regular Visit    Verification of patient location:    Patient is located in the following state in which I hold an active license [place]      Assessment/Plan:    Problem List Items Addressed This Visit     None      Visit Diagnoses     Colitis    -  Primary         I reviewed everything that the patient had done in the ER and her ER visit  She is going to follow-up with GI  She has completed her antibiotics  Her symptoms return before her GI visit she will let me know  Reason for visit is   Chief Complaint   Patient presents with    Follow-up     PT CALLS IN FOR E R  FOLLOW UP    ABDOMINAL PAIN, DIARRHEA  PT DUE FOR PHYSICAL AND FBW   Virtual Regular Visit        Encounter provider Eula Sprague MD    Provider located at [address] [place] [id number]      Recent Visits  Date Type Provider Dept   07/22/21 Telephone [facility] Chandler Blackburn   Showing recent visits within past 7 days and meeting all other requirements  Today's Visits  Date Type Provider Dept   07/27/21 MD Chandler Dahl   Showing today's visits and meeting all other requirements  Future Appointments  No visits were found meeting these conditions  Showing future appointments within next 150 days and meeting all other requirements       The patient was identified by name and date of birth  Herman Almazan was informed that this is a telemedicine visit and that the visit is being conducted through [address] [place] [address] Now and patient was informed that this is a secure, HIPAA-compliant platform  She agrees to proceed     My office door was closed  No one else was in the room  She acknowledged consent and understanding of privacy and security of the video platform  The patient has agreed to participate and understands they can discontinue the visit at any time  Patient is aware this is a billable service       Subjective  Herman Almazan is a 57 y o  female    The patient is being seen virtually today to follow-up after an ER visit   The pt had stomach aches for weeks, gas  By last weekend she was feeling terrible  She had terrible abd pain and went to the ER  Felt constipated  No blood in the stool initially but some by the end of the day  She was diagnosed with colitis  She had a CT scan  She had stool cx  She had blood work  She got fluids and meds to help her sx  She was started on abx - Augmentin   She has completed this   She has cont with a bland diet  Starting the slowly increase her diet  Due for colonoscopy - made an appt with GI at the end August  Stools are now formed        I reviewed the patient's ER notes, CT scan, labs, stool cultures       Past Medical History:   Diagnosis Date    Nephrolithiasis     Sessile colonic polyp     last assessed 9/1/16    TMJ (temporomandibular joint disorder)        Past Surgical History:   Procedure Laterality Date    APPENDECTOMY      COLONOSCOPY      fiberoptic/screening - last assessed 9/2/14    COLPOSCOPY      cervix w/bx w/endocervical curettage     MANDIBLE FRACTURE SURGERY      jaw surgery - last assessed 9/2/14 - this was for the TMJ (Dr Shadi Belle)    TONSILLECTOMY         Current Outpatient Medications   Medication Sig Dispense Refill    aspirin 325 mg tablet Take 325 mg by mouth daily      CALCIUM CITRATE PO Take 500 mg by mouth      cholecalciferol (VITAMIN D3) 1,000 units tablet Take 1,000 Units by mouth daily      Multiple Vitamin (MULTIVITAMIN) tablet Take 1 tablet by mouth daily      Rhinocort Aqua 32 MCG/ACT nasal spray 1 spray into each nostril daily (Patient taking differently: 1 spray into each nostril as needed ) 15 mL 3     No current facility-administered medications for this visit  Allergies   Allergen Reactions    Cefaclor Hives     Category: Allergy;     Cephalexin Nausea Only     Reaction Date: 28Apr2011; Category:  Adverse Reaction;     Egg-Pro  [Compleat]  Avelox [Moxifloxacin] Palpitations     Reaction Date: [id number]; Category: Adverse Reaction; Review of Systems    Video Exam    Vitals:    07/27/21 1032   Temp: 98 4 °F (36 9 °C)   Weight: 44 kg (97 lb)   Height: 5' (1 524 m)       Physical Exam  Constitutional:       General: She is not in acute distress  Appearance: She is well-developed  She is not diaphoretic  HENT:      Head: Normocephalic  Eyes:      Conjunctiva/sclera: Conjunctivae normal    Pulmonary:      Effort: Pulmonary effort is normal  No respiratory distress  Musculoskeletal:      Cervical back: Normal range of motion  Skin:     General: Skin is warm and dry  Neurological:      General: No focal deficit present  Mental Status: She is alert and oriented to person, place, and time  Psychiatric:         Mood and Affect: Mood normal          Behavior: Behavior normal          Thought Content: Thought content normal          Judgment: Judgment normal           I spent 15 minutes directly with the patient during this visit    VIRTUAL VISIT DISCLAIMER      Jamila Young verbally agrees to participate in [employer]  Pt is aware that [employer] could be limited without vital signs or the ability to perform a full hands-on physical Promise Wan understands she or the provider may request at any time to terminate the video visit and request the patient to seek care or treatment in person

## 2021-08-03 ENCOUNTER — TELEPHONE (OUTPATIENT)
Dept: FAMILY MEDICINE CLINIC | Facility: CLINIC | Age: 61
End: 2021-08-03

## 2021-08-04 ENCOUNTER — TELEPHONE (OUTPATIENT)
Dept: FAMILY MEDICINE CLINIC | Facility: CLINIC | Age: 61
End: 2021-08-04

## 2021-08-10 ENCOUNTER — TELEPHONE (OUTPATIENT)
Dept: GASTROENTEROLOGY | Facility: CLINIC | Age: 61
End: 2021-08-10

## 2021-08-10 NOTE — TELEPHONE ENCOUNTER
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

## 2021-08-11 ENCOUNTER — TELEPHONE (OUTPATIENT)
Dept: GASTROENTEROLOGY | Facility: CLINIC | Age: 61
End: 2021-08-11

## 2021-08-11 ENCOUNTER — OFFICE VISIT (OUTPATIENT)
Dept: GASTROENTEROLOGY | Facility: CLINIC | Age: 61
End: 2021-08-11
Payer: COMMERCIAL

## 2021-08-11 VITALS
SYSTOLIC BLOOD PRESSURE: 116 MMHG | BODY MASS INDEX: 18.06 KG/M2 | WEIGHT: 92 LBS | HEIGHT: 60 IN | DIASTOLIC BLOOD PRESSURE: 78 MMHG | HEART RATE: 83 BPM

## 2021-08-11 DIAGNOSIS — R19.7 DIARRHEA OF PRESUMED INFECTIOUS ORIGIN: ICD-10-CM

## 2021-08-11 DIAGNOSIS — Z86.010 HISTORY OF COLON POLYPS: ICD-10-CM

## 2021-08-11 DIAGNOSIS — R63.4 WEIGHT LOSS, ABNORMAL: ICD-10-CM

## 2021-08-11 DIAGNOSIS — R10.30 LOWER ABDOMINAL PAIN: Primary | ICD-10-CM

## 2021-08-11 PROBLEM — Z86.0100 HISTORY OF COLON POLYPS: Status: ACTIVE | Noted: 2021-08-11

## 2021-08-11 PROCEDURE — 3008F BODY MASS INDEX DOCD: CPT | Performed by: FAMILY MEDICINE

## 2021-08-11 PROCEDURE — 99204 OFFICE O/P NEW MOD 45 MIN: CPT | Performed by: INTERNAL MEDICINE

## 2021-08-11 RX ORDER — OCTISALATE, AVOBENZONE, HOMOSALATE, AND OCTOCRYLENE 29.4; 29.4; 49; 25.48 MG/ML; MG/ML; MG/ML; MG/ML
4 LOTION TOPICAL DAILY
Qty: 30 CAPSULE | Refills: 5
Start: 2021-08-11 | End: 2021-11-04

## 2021-08-11 RX ORDER — DICYCLOMINE HYDROCHLORIDE 10 MG/1
10 CAPSULE ORAL
Qty: 120 CAPSULE | Refills: 2 | Status: SHIPPED | OUTPATIENT
Start: 2021-08-11 | End: 2021-11-04

## 2021-08-11 NOTE — PATIENT INSTRUCTIONS
AVOID DAIRY, HIGH SUGAR CONTENT, HIGH CARB LOADS  DAIRY-FREE DIET    Lactose is the sugar that is found naturally in milk and milk products, as well as foods with ingredients such as milk or whey  In order for the body to use lactose, it has to break it down using an enzyme called lactase  Lactose intolerance is a condition that occurs when a person does not produce enough lactase to break down the lactose in food  Symptoms of lactose intolerance include:    Abdominal cramping  Bloating  Gas  Diarrhea    These symptoms may occur shortly after eating foods that contain lactose, but sometimes will not occur until hours later  This is because people vary in the amount of lactose they can tolerate  Avoiding Lactose    There are many different words that are used to describe the different forms that lactose may come in  Read food labels, and stay away from foods with any of the following ingredients:    Milk  Skim milk powder  Skim milk solids  Lactose  Whey  Caseinate  Curd    Reading food/nutrition labels is a very important habit to get into when looking for foods that do not contain lactose  Lactose can come in many "hidden" forms, and even products that do not appear to have milk included, may in fact have a milk product as an ingredient      Lactose-Free Foods    Following are lists of lactose-free foods, and foods to avoid:    Food Group     Beverages  ENJOY: Teas, regular iced tea, regular carbonated beverages, soy milk, cocoa powder, Nestle's Quik   AVOID: Milk (all types), powdered milk, sweetened/condensed milk, instant hot cocoa, instant iced tea, Ovaltine, chocolate drink mixes, cream, half-n-half and diet soda    Breads/Cereals/Crackers   ENJOY: [place] bread, Western Kathy bread, Mandaeism rye bread, [place] bread, angelica crackers, soda crackers, Ritz crackers, hot or cold cereals without added milk solids (read the label)   AVOID: Breads/rolls containing milk, prepared baking mixes (muffins, biscuits, pancakes, etc ), Zwieback, corn nuts, instant cereal with added milk solids    Potatoes and Starches   ENJOY: Items prepared without milk or milk products: macaroni, noodles, rice, spaghetti, white/sweet potatoes   AVOID: Products with milk added, such as instant potatoes, frozen Western Kathy fries, Scalloped/au gratin potatoes, macaroni and cheese mixes    Vegetables   ENJOY: Fresh, frozen, and canned vegetables without added milk products   AVOID: Creamed or breaded vegetables or vegetables with margarine added    Fruit   ENJOY: Fresh, canned or frozen fruit not processed with milk/milk products   AVOID: Any canned or frozen fruit processed with milk or milk products    Meat and Meat Substitutes   ENJOY: Plain meat, fish, poultry, eggs, Kosher prepared meat products, soybean meat substitutes, dried peas, beans, lentils, and nuts   AVOID: Breaded or creamed eggs, fish, meat or poultry, luncheon meats, sausage, hot dogs containing cheese or cheese products    Fats and Oils   ENJOY: [place]o, shortening, Miracle Whip, milk-free margarine, diet imitation margarine, salad dressings without milk products, vegetable oils, olives, mayonnaise, Coffee Rich and Rich's Whipped Topping   AVOID: Sour cream, cream cheese, chip dips, sauces and salad dressings made with milk products and peanut butter with added milk solids    Soups/Combination Foods   ENJOY: Bouillon, broth, vegetable soups, clear, soups, consommes, homemade soups made with allowed ingredients   AVOID: Chowders, ream soups, canned and dehydrated soups containing milk products    Seasonings   ENJOY: Pure monosodium glutamate (msg), soy sauce, carob powder, olives, gravy made with water, Baker's cocoa, pure seasonings and spices, sugar, honey, corn syrup, jam, jelly, marmalade, and molasses   AVOID: Condiments with milk solids or lactose added    Desserts   ENJOY: [place]'s, homemade cookies, cakes or pies made from allowed ingredients, tofu desserts, pure-sugar candies, marshmallows, and gelatin   AVOID: Desserts prepared with milk/milk products, pudding, sherbet, ice cream, custard, frozen yogurt, toffee, peppermint, butterscotch, chocolate, caramels, reduced-calorie desserts made with sugar substitute, or chewing gum made with lactose

## 2021-08-11 NOTE — PROGRESS NOTES
[facility] - Outpatient Consultation  Cody Pierre 57 y o  female MRN: [id number]  Encounter: [id number]    ASSESSMENT AND PLAN:      1  Lower abdominal pain  57-year-old female referred to us by Dr Nadege Johnson for abdominal pain and diarrhea  Reviewed the stool studies which were all negative from July 17th  CT scan from [facility] ER reviewed, sigmoid colitis  Likely infectious in etiology with some post infectious irritable bowel symptoms  - start dicyclomine (BENTYL) 10 mg capsule; Take 1 capsule (10 mg total) by mouth 4 (four) times a day (before meals and at bedtime)  Dispense: 120 capsule; Refill: 2  - dairy free diet, and limit high sugar carb loads  Okay to start reintroducing some other foods as tolerated  Keep a food journal   - start some probiotics given she finished a course of antibiotics last month  - Schedule colonoscopy @ [facility] and assess for inflammatory bowel disease, biopsy for microscopic colitis  We will schedule this sometime at the end of 9/2021 to give 6-8 weeks from completion of Abx      2  Diarrhea of presumed infectious origin    - Probiotic Product (Align) 4 MG CAPS; Take 1 capsule (4 mg total) by mouth daily  Dispense: 30 capsule; Refill: 5    3  History of colon polyps  Last colonoscopy in 9/2016, so due now  4  Weight loss, abnormal - likely 2/2 to not eating as well recently w the infection and abd cramping  Will recheck in 3 months  Followup Appointment: 3 months to f/u weight loss  ______________________________________________________________________    Chief Complaint   Patient presents with    Infectious Colitis     follow  up ED Speed  HPI:   Cody Pierre is a 57y o  year old female who presents today at the request of Dr Nadege Johnson for colitis  Patient states that she has been otherwise doing well  No sick contacts  No recent travel  No new medications    On July 17th, she had severe episode of lower abdominal pain, diarrhea, nausea  She went to the ER in [place] and CT showed sigmoid colitis  Stool studies were negative including C diff  Patient is a nurse  Patient completed 7 days of Augmentin with improvement  However, she was having intermittent issues with bloating and lower abdominal cramping  Then last week, she had some tacos, and this triggered another round of significant bloating and lower abdominal pain  No significant diarrhea or bleeding  No fevers or chills  No nausea or vomiting at this point  She has lost about 8 lb through all this  She is already fairly petite at baseline  She is scared to eat because of the abdominal pain and diarrhea and has been eating mainly chicken and mashed potatoes      Historical Information   Past Medical History:   Diagnosis Date    Nephrolithiasis     Sessile colonic polyp     last assessed 9/1/16    TMJ (temporomandibular joint disorder)      Past Surgical History:   Procedure Laterality Date    APPENDECTOMY      COLONOSCOPY      fiberoptic/screening - last assessed 9/2/14    COLPOSCOPY      cervix w/bx w/endocervical curettage     MANDIBLE FRACTURE SURGERY      jaw surgery - last assessed 9/2/14 - this was for the TMJ (Dr Krunal Mahan)    TONSILLECTOMY       Social History     Substance and Sexual Activity   Alcohol Use Not Currently    Comment: social      Social History     Substance and Sexual Activity   Drug Use No     Social History     Tobacco Use   Smoking Status Current Every Day Smoker    Packs/day: 0 50    Types: Cigarettes   Smokeless Tobacco Never Used     Family History   Problem Relation Age of Onset    Stroke Father         CVA    No Known Problems Mother     No Known Problems Sister     No Known Problems Daughter     No Known Problems Maternal Grandmother     No Known Problems Maternal Grandfather     No Known Problems Paternal Grandmother     No Known Problems Paternal Grandfather     No Known Problems Daughter     No Known Problems Paternal Aunt     No Known Problems Paternal Aunt     Breast cancer Cousin         52's    Cirrhosis Neg Hx     Colon cancer Neg Hx     Colon polyps Neg Hx        Meds/Allergies     Current Outpatient Medications:     aspirin 325 mg tablet    CALCIUM CITRATE PO    cholecalciferol (VITAMIN D3) 1,000 units tablet    Multiple Vitamin (MULTIVITAMIN) tablet    dicyclomine (BENTYL) 10 mg capsule    Probiotic Product (Align) 4 MG CAPS    Allergies   Allergen Reactions    Cefaclor Hives     Category: Allergy;     Cephalexin Nausea Only     Reaction Date: 28Apr2011; Category: Adverse Reaction;     Egg-Pro  [Compleat]     Avelox [Moxifloxacin] Palpitations     Reaction Date: [id number]; Category: Adverse Reaction;        PHYSICAL EXAM:    Blood pressure 116/78, pulse 83, height 5' (1 524 m), weight 41 7 kg (92 lb)  Body mass index is 17 97 kg/m²  General Appearance: NAD, cooperative, alert  Eyes: Anicteric, PERRLA, EOMI  ENT:  Normocephalic, atraumatic, normal mucosa  Neck:  Supple, symmetrical, trachea midline,   Resp:  Clear to auscultation bilaterally; no rales, rhonchi or wheezing; respirations unlabored   CV:  S1 S2, Regular rate and rhythm; no murmur, rub, or gallop  GI:  Soft, non-tender, non-distended; normal bowel sounds; no masses, no organomegaly   Rectal: Deferred  Musculoskeletal: No cyanosis, clubbing or edema  Normal ROM    Skin:  No jaundice, rashes, or lesions   Heme/Lymph: No palpable cervical lymphadenopathy  Psych: Normal affect, good eye contact  Neuro: No gross deficits, AAOx3    Lab Results:   Lab Results   Component Value Date    WBC 4 9 10/22/2019    HGB 15 9 (H) 10/22/2019    HCT 47 1 (H) 10/22/2019    MCV 94 2 10/22/2019     10/22/2019     Lab Results   Component Value Date    K 4 4 10/22/2019     10/22/2019    CO2 24 10/22/2019    BUN 17 10/22/2019    CREATININE 0 88 10/22/2019    CALCIUM 9 6 10/22/2019    AST 20 10/22/2019    ALT 16 10/22/2019    ALKPHOS 69 10/22/2019     No results found for: IRON, TIBC, FERRITIN  No results found for: LIPASE    Radiology Results:   No results found  REVIEW OF SYSTEMS:    CONSTITUTIONAL: Denies any fever, chills, rigors  Positive for weight loss  HEENT: No earache or tinnitus  Denies hearing loss or visual disturbances  CARDIOVASCULAR: No chest pain or palpitations  RESPIRATORY: Denies any cough, hemoptysis, shortness of breath or dyspnea on exertion  GASTROINTESTINAL: As noted in the History of Present Illness  GENITOURINARY: No problems with urination  Denies any hematuria or dysuria  NEUROLOGIC: No dizziness or vertigo, denies headaches  MUSCULOSKELETAL: Denies any muscle or joint pain  SKIN: Denies skin rashes or itching  ENDOCRINE: Denies excessive thirst  Denies intolerance to heat or cold  PSYCHOSOCIAL: Denies depression or anxiety  Denies any recent memory loss

## 2021-09-28 ENCOUNTER — ANESTHESIA EVENT (OUTPATIENT)
Dept: GASTROENTEROLOGY | Facility: AMBULATORY SURGERY CENTER | Age: 61
End: 2021-09-28

## 2021-09-28 ENCOUNTER — ANESTHESIA (OUTPATIENT)
Dept: GASTROENTEROLOGY | Facility: AMBULATORY SURGERY CENTER | Age: 61
End: 2021-09-28

## 2021-09-28 ENCOUNTER — HOSPITAL ENCOUNTER (OUTPATIENT)
Dept: GASTROENTEROLOGY | Facility: AMBULATORY SURGERY CENTER | Age: 61
Discharge: HOME | End: 2021-09-28
Payer: COMMERCIAL

## 2021-09-28 VITALS
BODY MASS INDEX: 18.55 KG/M2 | RESPIRATION RATE: 23 BRPM | HEART RATE: 69 BPM | DIASTOLIC BLOOD PRESSURE: 72 MMHG | OXYGEN SATURATION: 97 % | TEMPERATURE: 98.4 F | WEIGHT: 95 LBS | SYSTOLIC BLOOD PRESSURE: 141 MMHG

## 2021-09-28 DIAGNOSIS — R10.30 LOWER ABDOMINAL PAIN: ICD-10-CM

## 2021-09-28 PROCEDURE — 88305 TISSUE EXAM BY PATHOLOGIST: CPT | Performed by: PATHOLOGY

## 2021-09-28 PROCEDURE — 45380 COLONOSCOPY AND BIOPSY: CPT | Performed by: INTERNAL MEDICINE

## 2021-09-28 RX ORDER — SODIUM CHLORIDE, SODIUM LACTATE, POTASSIUM CHLORIDE, CALCIUM CHLORIDE 600; 310; 30; 20 MG/100ML; MG/100ML; MG/100ML; MG/100ML
50 INJECTION, SOLUTION INTRAVENOUS CONTINUOUS
Status: DISCONTINUED | OUTPATIENT
Start: 2021-09-28 | End: 2021-10-02 | Stop reason: HOSPADM

## 2021-09-28 RX ORDER — PROPOFOL 10 MG/ML
INJECTION, EMULSION INTRAVENOUS AS NEEDED
Status: DISCONTINUED | OUTPATIENT
Start: 2021-09-28 | End: 2021-09-28

## 2021-09-28 RX ORDER — LIDOCAINE HYDROCHLORIDE 10 MG/ML
INJECTION, SOLUTION EPIDURAL; INFILTRATION; INTRACAUDAL; PERINEURAL AS NEEDED
Status: DISCONTINUED | OUTPATIENT
Start: 2021-09-28 | End: 2021-09-28

## 2021-09-28 RX ADMIN — PROPOFOL 20 MG: 10 INJECTION, EMULSION INTRAVENOUS at 09:04

## 2021-09-28 RX ADMIN — PROPOFOL 20 MG: 10 INJECTION, EMULSION INTRAVENOUS at 09:15

## 2021-09-28 RX ADMIN — PROPOFOL 120 MG: 10 INJECTION, EMULSION INTRAVENOUS at 08:59

## 2021-09-28 RX ADMIN — PROPOFOL 20 MG: 10 INJECTION, EMULSION INTRAVENOUS at 09:11

## 2021-09-28 RX ADMIN — PROPOFOL 20 MG: 10 INJECTION, EMULSION INTRAVENOUS at 09:08

## 2021-09-28 RX ADMIN — SODIUM CHLORIDE, SODIUM LACTATE, POTASSIUM CHLORIDE, CALCIUM CHLORIDE 50 ML/HR: 600; 310; 30; 20 INJECTION, SOLUTION INTRAVENOUS at 08:42

## 2021-09-28 RX ADMIN — LIDOCAINE HYDROCHLORIDE 50 MG: 10 INJECTION, SOLUTION EPIDURAL; INFILTRATION; INTRACAUDAL; PERINEURAL at 08:59

## 2021-09-28 RX ADMIN — PROPOFOL 20 MG: 10 INJECTION, EMULSION INTRAVENOUS at 09:02

## 2021-09-28 NOTE — ANESTHESIA POSTPROCEDURE EVALUATION
Post-Op Assessment Note    CV Status:  Stable  Pain Score: 0    Pain management: adequate     Mental Status:  Awake   Hydration Status:  Euvolemic   PONV Controlled:  None   Airway Patency:  Patent      Post Op Vitals Reviewed: Yes      Staff: Anesthesiologist, CRNA   Comments: report given to RN; VSS; RA        No complications documented      BP   115/63   Temp      Pulse  68   Resp   20   SpO2   97

## 2021-09-28 NOTE — ANESTHESIA PREPROCEDURE EVALUATION
Procedure:  COLONOSCOPY    Relevant Problems   ANESTHESIA (within normal limits)      CARDIO   (+) Hyperlipidemia      MUSCULOSKELETAL   (+) Osteoarthritis of both hands      NEURO/PSYCH   (+) History of colon polyps      PULMONARY (within normal limits)   (-) Sleep apnea   (-) Smoking   (-) URI (upper respiratory infection)      Other   (+) TMJ (temporomandibular joint disorder) (bilateral; hx jaw surgery)        Physical Exam    Airway  Comment: Normal mouth opening  Mallampati score: II  TM Distance: >3 FB       Dental   No notable dental hx     Cardiovascular      Pulmonary      Other Findings        Anesthesia Plan  ASA Score- 1     Anesthesia Type- IV sedation with anesthesia with ASA Monitors  Additional Monitors:   Airway Plan:           Plan Factors-Exercise tolerance (METS): >4 METS  Chart reviewed  Existing labs reviewed  Patient summary reviewed  Patient is not a current smoker  Induction- intravenous  Postoperative Plan-     Informed Consent- Anesthetic plan and risks discussed with patient  I personally reviewed this patient with the CRNA  Discussed and agreed on the Anesthesia Plan with the CRNA  Michelle Esquivel

## 2021-11-04 ENCOUNTER — TELEMEDICINE (OUTPATIENT)
Dept: GASTROENTEROLOGY | Facility: CLINIC | Age: 61
End: 2021-11-04
Payer: COMMERCIAL

## 2021-11-04 VITALS — WEIGHT: 96 LBS | HEIGHT: 60 IN | BODY MASS INDEX: 18.85 KG/M2

## 2021-11-04 DIAGNOSIS — R63.4 WEIGHT LOSS, ABNORMAL: ICD-10-CM

## 2021-11-04 DIAGNOSIS — R19.7 DIARRHEA OF PRESUMED INFECTIOUS ORIGIN: Primary | ICD-10-CM

## 2021-11-04 DIAGNOSIS — Z86.010 HISTORY OF COLON POLYPS: ICD-10-CM

## 2021-11-04 PROCEDURE — 99213 OFFICE O/P EST LOW 20 MIN: CPT | Performed by: INTERNAL MEDICINE

## 2021-11-11 ENCOUNTER — TELEMEDICINE (OUTPATIENT)
Dept: FAMILY MEDICINE CLINIC | Facility: CLINIC | Age: 61
End: 2021-11-11
Payer: COMMERCIAL

## 2021-11-11 DIAGNOSIS — Z71.85 IMMUNIZATION COUNSELING: Primary | ICD-10-CM

## 2021-11-11 PROBLEM — R63.4 WEIGHT LOSS, ABNORMAL: Status: RESOLVED | Noted: 2021-11-04 | Resolved: 2021-11-11

## 2021-11-11 PROCEDURE — 99213 OFFICE O/P EST LOW 20 MIN: CPT | Performed by: FAMILY MEDICINE

## 2021-11-18 ENCOUNTER — ANNUAL EXAM (OUTPATIENT)
Dept: OBGYN CLINIC | Facility: CLINIC | Age: 61
End: 2021-11-18
Payer: COMMERCIAL

## 2021-11-18 VITALS
WEIGHT: 93.8 LBS | BODY MASS INDEX: 18.42 KG/M2 | SYSTOLIC BLOOD PRESSURE: 140 MMHG | DIASTOLIC BLOOD PRESSURE: 92 MMHG | HEIGHT: 60 IN

## 2021-11-18 DIAGNOSIS — Z12.31 ENCOUNTER FOR SCREENING MAMMOGRAM FOR BREAST CANCER: ICD-10-CM

## 2021-11-18 DIAGNOSIS — Z87.39 HISTORY OF OSTEOPOROSIS: ICD-10-CM

## 2021-11-18 DIAGNOSIS — Z98.890 HISTORY OF COLONOSCOPY: ICD-10-CM

## 2021-11-18 DIAGNOSIS — Z01.419 WOMEN'S ANNUAL ROUTINE GYNECOLOGICAL EXAMINATION: Primary | ICD-10-CM

## 2021-11-18 DIAGNOSIS — Z86.010 HISTORY OF COLON POLYPS: ICD-10-CM

## 2021-11-18 DIAGNOSIS — Z92.89 HISTORY OF MAMMOGRAM: ICD-10-CM

## 2021-11-18 PROCEDURE — G0145 SCR C/V CYTO,THINLAYER,RESCR: HCPCS | Performed by: OBSTETRICS & GYNECOLOGY

## 2021-11-18 PROCEDURE — G0476 HPV COMBO ASSAY CA SCREEN: HCPCS | Performed by: OBSTETRICS & GYNECOLOGY

## 2021-11-18 PROCEDURE — 99386 PREV VISIT NEW AGE 40-64: CPT | Performed by: OBSTETRICS & GYNECOLOGY

## 2021-11-22 LAB
HPV HR 12 DNA CVX QL NAA+PROBE: NEGATIVE
HPV16 DNA CVX QL NAA+PROBE: NEGATIVE
HPV18 DNA CVX QL NAA+PROBE: NEGATIVE

## 2021-11-26 LAB
LAB AP GYN PRIMARY INTERPRETATION: NORMAL
Lab: NORMAL

## 2021-12-06 NOTE — PROGRESS NOTES
Daily Note     Today's date: 2018  Patient name: Almas Ramirez  : 1960  MRN: [id number]  Referring provider: Derick Browne MD  Dx:   Encounter Diagnosis     ICD-10-CM    1  Stress fracture of right calcaneus M84 374A    2  Metatarsalgia of right foot M77 41                   Subjective:  Pt is up to 5 hours a day of walking without the boot, but she c/o increased pain and swelling in the retro-calcaneus region  She called her MD to inform him  Objective: See treatment diary below  Assessment: Tolerated treatment well  Patient would benefit from continued PT      Plan: Continue per plan of care  consider use of a heel lift for gait training         Precautions: osteoporosis    Daily Treatment Diary     Manual             Prone - tissue deformation - soleus  LMR           Prone - tissue deformation - gastroc  LMR           Supine - gr 2 calcaneal fig-8 mobs  LMR           Supine - tissue deformation - peroneals  LMR           Gr 4 post TCJ glide  LMR                                                                                Exercise Diary             gastroc stretch - towel 1'x3 hep           Seated - HR 2x25 hep           Ankle ABC's 30            Bike - arom             VG - HR  L3 - 4'           biodex             rocker board - arom  nv           VG - squats  L4 - 5'           Gait training  nv                                                                                                                                                              Modalities no

## 2022-01-13 ENCOUNTER — HOSPITAL ENCOUNTER (OUTPATIENT)
Dept: MAMMOGRAPHY | Facility: IMAGING CENTER | Age: 62
Discharge: HOME | End: 2022-01-13
Payer: COMMERCIAL

## 2022-01-13 VITALS — BODY MASS INDEX: 19.04 KG/M2 | WEIGHT: 97 LBS | HEIGHT: 60 IN

## 2022-01-13 DIAGNOSIS — Z12.31 ENCOUNTER FOR SCREENING MAMMOGRAM FOR BREAST CANCER: ICD-10-CM

## 2022-01-13 PROCEDURE — 77063 BREAST TOMOSYNTHESIS BI: CPT

## 2022-01-13 PROCEDURE — 77067 SCR MAMMO BI INCL CAD: CPT

## 2022-02-28 ENCOUNTER — TELEPHONE (OUTPATIENT)
Dept: FAMILY MEDICINE CLINIC | Facility: CLINIC | Age: 62
End: 2022-02-28

## 2022-02-28 DIAGNOSIS — J01.00 ACUTE NON-RECURRENT MAXILLARY SINUSITIS: Primary | ICD-10-CM

## 2022-02-28 RX ORDER — PREDNISONE 10 MG/1
TABLET ORAL
Qty: 20 TABLET | Refills: 0 | Status: SHIPPED | OUTPATIENT
Start: 2022-02-28 | End: 2022-05-17 | Stop reason: ALTCHOICE

## 2022-02-28 RX ORDER — DOXYCYCLINE HYCLATE 100 MG/1
100 CAPSULE ORAL EVERY 12 HOURS SCHEDULED
Qty: 20 CAPSULE | Refills: 0 | Status: SHIPPED | OUTPATIENT
Start: 2022-02-28 | End: 2022-03-10

## 2022-02-28 RX ORDER — DOXYCYCLINE 100 MG/1
100 CAPSULE ORAL 2 TIMES DAILY
Qty: 20 CAPSULE | Refills: 0 | Status: SHIPPED | OUTPATIENT
Start: 2022-02-28 | End: 2022-03-10

## 2022-02-28 NOTE — TELEPHONE ENCOUNTER
Patient is requesting the doxycycline hyclate, not the monocyte as she had in 2020  Can you please send the doxycycline hyclate into [facility]?

## 2022-02-28 NOTE — TELEPHONE ENCOUNTER
Pt says she is having sinus infection and she is having post nasal drip and stuffy ears and no fever no cough she said doxy will work for her wants into pharmacy     [facility]

## 2022-04-08 NOTE — PROGRESS NOTES
Assessment/Plan:    No problem-specific Assessment & Plan notes found for this encounter  Diagnoses and all orders for this visit:    Chronic maxillary sinusitis  -     doxycycline monohydrate (MONODOX) 100 mg capsule; Take 1 capsule (100 mg total) by mouth 2 (two) times a day for 10 days  -     predniSONE 10 mg tablet; 40mg qdx3d then 30mg qdx3d then 20mg qdx3d then 10mg qdx3d    Other orders  -     Multiple Vitamin (MULTIVITAMIN) tablet; Take 1 tablet by mouth daily  -     aspirin 325 mg tablet; Take 325 mg by mouth daily         she will continue with her Rhinocort and is given a weaning dose of prednisone as samples to take along with the doxycycline  Subjective:      Patient ID: Beny Cheema is a 61 y o  female  Patient is here with complaints of sinus pain and pressure and particular muffling and discomfort into the right ear  She was treated with the Zithromax right after Christmas but never had resolution of her symptoms completely  She now notes muffling to her hearing and crackling        The following portions of the patient's history were reviewed and updated as appropriate: allergies, current medications, past family history, past medical history, past social history, past surgical history and problem list     Review of Systems   Constitutional: Negative for activity change, appetite change, chills, diaphoresis, fatigue and unexpected weight change  HENT: Positive for ear pain, sinus pain and sinus pressure  Negative for congestion, ear discharge, hearing loss, nosebleeds and rhinorrhea  Eyes: Negative for pain, redness, itching and visual disturbance  Respiratory: Negative for cough, choking, chest tightness and shortness of breath  Cardiovascular: Negative for chest pain and leg swelling  Gastrointestinal: Negative for abdominal pain, blood in stool, constipation, diarrhea and nausea  Endocrine: Negative for cold intolerance, polydipsia and polyphagia  Genitourinary: Negative for dysuria, frequency, hematuria and urgency  Musculoskeletal: Negative for arthralgias, back pain, gait problem, joint swelling, neck pain and neck stiffness  Skin: Negative for color change and rash  Allergic/Immunologic: Negative for environmental allergies and food allergies  Neurological: Negative for dizziness, tremors, seizures, speech difficulty, numbness and headaches  Hematological: Negative for adenopathy  Does not bruise/bleed easily  Psychiatric/Behavioral: Negative for behavioral problems, dysphoric mood, hallucinations and self-injury  Objective:    Vitals:    02/10/18 0903   BP: 128/72   Pulse: 72   Temp: 99 °F (37 2 °C)   SpO2: 98%        Physical Exam   Constitutional: She is oriented to person, place, and time  She appears well-developed and well-nourished  HENT:   Head: Normocephalic and atraumatic  Left Ear: External ear normal    Mouth/Throat: Oropharynx is clear and moist  No oropharyngeal exudate  Sinus pressure  Right TM with fluid   Eyes: Conjunctivae and EOM are normal  Pupils are equal, round, and reactive to light  Right eye exhibits no discharge  Left eye exhibits no discharge  No scleral icterus  Neck: Normal range of motion  Neck supple  No thyromegaly present  Cardiovascular: Normal rate, regular rhythm and normal heart sounds  Exam reveals no gallop  No murmur heard  Pulmonary/Chest: Effort normal and breath sounds normal  No respiratory distress  She has no wheezes  She has no rales  Abdominal: Soft  Bowel sounds are normal  She exhibits no mass  There is no rebound and no guarding  Musculoskeletal: Normal range of motion  She exhibits no edema, tenderness or deformity  Lymphadenopathy:     She has no cervical adenopathy  Neurological: She is alert and oriented to person, place, and time  She has normal reflexes  No cranial nerve deficit  Coordination normal    Skin: Skin is warm and dry  No rash noted  Psychiatric: She has a normal mood and affect   Her behavior is normal  Judgment and thought content normal  show

## 2022-05-06 ENCOUNTER — CLINICAL SUPPORT (OUTPATIENT)
Dept: FAMILY MEDICINE CLINIC | Facility: CLINIC | Age: 62
End: 2022-05-06

## 2022-05-06 DIAGNOSIS — B34.9 VIRAL INFECTION, UNSPECIFIED: Primary | ICD-10-CM

## 2022-05-06 PROCEDURE — 87636 SARSCOV2 & INF A&B AMP PRB: CPT | Performed by: FAMILY MEDICINE

## 2022-05-07 LAB
FLUAV RNA RESP QL NAA+PROBE: NEGATIVE
FLUBV RNA RESP QL NAA+PROBE: NEGATIVE
SARS-COV-2 RNA RESP QL NAA+PROBE: POSITIVE

## 2022-05-10 ENCOUNTER — TELEPHONE (OUTPATIENT)
Dept: FAMILY MEDICINE CLINIC | Facility: CLINIC | Age: 62
End: 2022-05-10

## 2022-05-10 NOTE — TELEPHONE ENCOUNTER
----- Message from Mykel Muniz, DO sent at 5/9/2022  6:11 PM EDT -----  Your covid test is positive  If you have mild symptoms, quarantine for 5 days and mask for another 5 days  If you have more significant symptoms, please contact us

## 2022-05-12 ENCOUNTER — HOSPITAL ENCOUNTER (OUTPATIENT)
Dept: RADIOLOGY | Facility: HOSPITAL | Age: 62
Discharge: HOME | End: 2022-05-12
Payer: COMMERCIAL

## 2022-05-12 ENCOUNTER — TELEPHONE (OUTPATIENT)
Dept: FAMILY MEDICINE CLINIC | Facility: CLINIC | Age: 62
End: 2022-05-12

## 2022-05-12 DIAGNOSIS — U07.1 COVID-19: Primary | ICD-10-CM

## 2022-05-12 DIAGNOSIS — U07.1 COVID-19: ICD-10-CM

## 2022-05-12 PROCEDURE — 71046 X-RAY EXAM CHEST 2 VIEWS: CPT

## 2022-05-12 NOTE — TELEPHONE ENCOUNTER
Patient tested positive for covid 10 days ago  She still has a loose cough and lingering congestion  She is asking if you can order a chest xray for her? Please advise

## 2022-05-12 NOTE — TELEPHONE ENCOUNTER
Orders for chest xray are in   Walk in at [facility] for old hospital before [facility] or new hospital any time    Rashad Palacio, ignore other note

## 2022-05-12 NOTE — TELEPHONE ENCOUNTER
Yaakov Michelle had xray today at at [facility]   She had the xray of the chest   She has questions about it  She wanted to thank you  Please look at the xray  And she wants you to explain what it means  Does she need to do anything else  What is her next step? Linda Alberto your return

## 2022-05-13 ENCOUNTER — TELEPHONE (OUTPATIENT)
Dept: FAMILY MEDICINE CLINIC | Facility: CLINIC | Age: 62
End: 2022-05-13

## 2022-05-13 NOTE — TELEPHONE ENCOUNTER
See other note in results  Pt has no changes from covid noted in the lung, no pneumonia  It does show changes from copd/emphysema from smoking  She needs to monitor her breathing for improvement  Sometimes we use inhalers if needed

## 2022-05-13 NOTE — TELEPHONE ENCOUNTER
----- Message from Caprice Juarez DO sent at 5/13/2022  1:03 PM EDT -----  Your chest xray shows changes from copd/emphysema  No pneumonia or changes from covid  Although covid can certainly make your breathing worse

## 2022-05-17 ENCOUNTER — TELEMEDICINE (OUTPATIENT)
Dept: FAMILY MEDICINE CLINIC | Facility: CLINIC | Age: 62
End: 2022-05-17

## 2022-05-17 DIAGNOSIS — Z13.29 SCREENING FOR THYROID DISORDER: ICD-10-CM

## 2022-05-17 DIAGNOSIS — Z13.1 SCREENING FOR DIABETES MELLITUS: ICD-10-CM

## 2022-05-17 DIAGNOSIS — Z13.0 SCREENING, ANEMIA, DEFICIENCY, IRON: ICD-10-CM

## 2022-05-17 DIAGNOSIS — Z12.2 SCREENING FOR LUNG CANCER: ICD-10-CM

## 2022-05-17 DIAGNOSIS — E78.2 MIXED HYPERLIPIDEMIA: ICD-10-CM

## 2022-05-17 DIAGNOSIS — U07.1 COVID-19: Primary | ICD-10-CM

## 2022-05-17 DIAGNOSIS — R06.02 SHORTNESS OF BREATH: ICD-10-CM

## 2022-05-17 PROCEDURE — 3725F SCREEN DEPRESSION PERFORMED: CPT | Performed by: FAMILY MEDICINE

## 2022-05-17 PROCEDURE — 99213 OFFICE O/P EST LOW 20 MIN: CPT | Performed by: FAMILY MEDICINE

## 2022-05-17 PROCEDURE — 4004F PT TOBACCO SCREEN RCVD TLK: CPT | Performed by: FAMILY MEDICINE

## 2022-05-17 RX ORDER — ALBUTEROL SULFATE 90 UG/1
2 AEROSOL, METERED RESPIRATORY (INHALATION) EVERY 6 HOURS PRN
Qty: 6.7 G | Refills: 5 | Status: SHIPPED | OUTPATIENT
Start: 2022-05-17 | End: 2022-06-02 | Stop reason: ALTCHOICE

## 2022-05-17 NOTE — PROGRESS NOTES
Virtual Regular Visit    Verification of patient location:    Patient is located in the following state in which I hold an active license [place]      Assessment/Plan:    Problem List Items Addressed This Visit        Other    Hyperlipidemia    Relevant Orders    Lipid Panel with Direct LDL reflex    COVID-19 - Primary     Still with shortness of breath  Needs follow up ct scan for recheck of abnormal xray showing emphysema           Shortness of breath    Relevant Medications    albuterol (Proventil HFA) 90 mcg/act inhaler      Other Visit Diagnoses     Screening, anemia, deficiency, iron        Relevant Orders    CBC and differential    Screening for diabetes mellitus        Relevant Orders    Comprehensive metabolic panel    Screening for thyroid disorder        Relevant Orders    TSH, 3rd generation with Free T4 reflex    Screening for lung cancer        Relevant Orders    CT lung screening program            Depression Screening and Follow-up Plan: Patient was screened for depression during today's encounter  They screened negative with a PHQ-2 score of 0  Tobacco Cessation Counseling: Tobacco cessation counseling was provided  The patient is sincerely urged to quit consumption of tobacco  She is not ready to quit tobacco  Medication options and side effects of medication not discussed  Patient refused medication  Reason for visit is   Chief Complaint   Patient presents with    Follow-up     Pt want totalk about her positive covid test and her chest xray    Virtual Regular Visit        Encounter provider Sherry Nunn DO    Provider located at [place] [address]  [address] [facility] [address]      Recent Visits  Date Type Provider Dept   05/17/22 Telemedicine DO Chandler Perez   Showing recent visits within past 7 days and meeting all other requirements  Future Appointments  No visits were found meeting these conditions    Showing future appointments within next 150 days and meeting all other requirements       The patient was identified by name and date of birth  Linda Herzog was informed that this is a telemedicine visit and that the visit is being conducted through [address] [facility] and patient was informed that this is a secure, HIPAA-compliant platform  She agrees to proceed     My office door was closed  No one else was in the room  She acknowledged consent and understanding of privacy and security of the video platform  The patient has agreed to participate and understands they can discontinue the visit at any time  Patient is aware this is a billable service  Subjective  Linda Herzog is a 60 y o  female had covid 23 and still with shortness of breath   Pt has covid diagnosed on 5/6 and still with cough and fatigue and shortness of breath  No fever  Symptoms gradually improving  Pt had chest xray showing emphysema  Pt is still smoking  Had a chest xray in the past and did not show emphysema   Is eligible for ct lung cancer screen- discussed       Past Medical History:   Diagnosis Date    Colitis     Nephrolithiasis     Sessile colonic polyp     last assessed 9/1/16    TMJ (temporomandibular joint disorder)        Past Surgical History:   Procedure Laterality Date    APPENDECTOMY      COLONOSCOPY      fiberoptic/screening - last assessed 9/2/14    COLPOSCOPY      cervix w/bx w/endocervical curettage     MANDIBLE FRACTURE SURGERY      jaw surgery - last assessed 9/2/14 - this was for the TMJ (Dr Vidhya Castro)    TONSILLECTOMY         Current Outpatient Medications   Medication Sig Dispense Refill    albuterol (Proventil HFA) 90 mcg/act inhaler Inhale 2 puffs every 6 (six) hours as needed for wheezing 6 7 g 5    aspirin 325 mg tablet Take 325 mg by mouth daily      CALCIUM CITRATE PO Take 500 mg by mouth      cholecalciferol (VITAMIN D3) 1,000 units tablet Take 1,000 Units by mouth daily      Multiple Vitamin (MULTIVITAMIN) tablet Take 1 tablet by mouth daily      doxycycline hyclate (VIBRAMYCIN) 100 mg capsule Take 1 capsule (100 mg total) by mouth every 12 (twelve) hours for 10 days 20 capsule 0     No current facility-administered medications for this visit  Allergies   Allergen Reactions    Cefaclor Hives     Category: Allergy;     Cephalexin Nausea Only     Reaction Date: 28Apr2011; Category: Adverse Reaction;     Eggs Or Egg-Derived Products - Food Allergy Other (See Comments)     congestion    Avelox [Moxifloxacin] Palpitations     Reaction Date: [id number]; Category: Adverse Reaction; Review of Systems   Constitutional: Positive for fatigue  Negative for fever  HENT: Negative  Eyes: Negative  Respiratory: Positive for cough and shortness of breath  Cardiovascular: Negative  Gastrointestinal: Negative  Endocrine: Negative  Genitourinary: Negative  Musculoskeletal: Negative  Skin: Negative  Allergic/Immunologic: Negative  Neurological: Negative  Psychiatric/Behavioral: Negative  Video Exam    There were no vitals filed for this visit  Physical Exam  Vitals and nursing note reviewed  Constitutional:       Appearance: Normal appearance  She is well-developed  HENT:      Head: Normocephalic and atraumatic  Eyes:      Conjunctiva/sclera: Conjunctivae normal    Pulmonary:      Effort: Pulmonary effort is normal    Neurological:      Mental Status: She is alert and oriented to person, place, and time  Psychiatric:         Behavior: Behavior normal          Thought Content: Thought content normal          Judgment: Judgment normal           I spent 15 minutes directly with the patient during this visit    VIRTUAL VISIT DISCLAIMER      Ayan Proctor verbally agrees to participate in [facility]   Pt is aware that [facility] could be limited without vital signs or the ability to perform a full hands-on physical Fred Schulz understands she or the provider may request at any time to terminate the video visit and request the patient to seek care or treatment in person
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
## 2022-05-19 DIAGNOSIS — J01.01 ACUTE RECURRENT MAXILLARY SINUSITIS: ICD-10-CM
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
## 2022-05-19 DIAGNOSIS — J32.8 OTHER CHRONIC SINUSITIS: ICD-10-CM

## 2022-05-19 RX ORDER — DOXYCYCLINE 100 MG/1
100 CAPSULE ORAL 2 TIMES DAILY
Qty: 20 CAPSULE | Refills: 1 | Status: SHIPPED | OUTPATIENT
Start: 2022-05-19 | End: 2022-05-19 | Stop reason: ALTCHOICE

## 2022-05-19 RX ORDER — DOXYCYCLINE HYCLATE 100 MG/1
100 CAPSULE ORAL EVERY 12 HOURS SCHEDULED
Qty: 20 CAPSULE | Refills: 0 | Status: SHIPPED | OUTPATIENT
Start: 2022-05-19 | End: 2022-05-29

## 2022-05-23 PROBLEM — R06.02 SHORTNESS OF BREATH: Status: ACTIVE | Noted: 2022-05-23

## 2022-05-23 NOTE — ASSESSMENT & PLAN NOTE
Still with shortness of breath   Needs follow up ct scan for recheck of abnormal xray showing emphysema

## 2022-06-02 ENCOUNTER — OFFICE VISIT (OUTPATIENT)
Dept: FAMILY MEDICINE CLINIC | Facility: CLINIC | Age: 62
End: 2022-06-02
Payer: COMMERCIAL

## 2022-06-02 VITALS
DIASTOLIC BLOOD PRESSURE: 80 MMHG | BODY MASS INDEX: 18.26 KG/M2 | TEMPERATURE: 98.3 F | SYSTOLIC BLOOD PRESSURE: 130 MMHG | HEART RATE: 87 BPM | OXYGEN SATURATION: 93 % | WEIGHT: 93 LBS | HEIGHT: 60 IN

## 2022-06-02 DIAGNOSIS — H65.04 RECURRENT ACUTE SEROUS OTITIS MEDIA OF RIGHT EAR: Primary | ICD-10-CM

## 2022-06-02 DIAGNOSIS — J01.00 ACUTE NON-RECURRENT MAXILLARY SINUSITIS: ICD-10-CM

## 2022-06-02 PROBLEM — H65.01 ACUTE SEROUS OTITIS MEDIA OF RIGHT EAR: Status: ACTIVE | Noted: 2022-06-02

## 2022-06-02 PROCEDURE — 4004F PT TOBACCO SCREEN RCVD TLK: CPT | Performed by: FAMILY MEDICINE

## 2022-06-02 PROCEDURE — 3008F BODY MASS INDEX DOCD: CPT | Performed by: FAMILY MEDICINE

## 2022-06-02 PROCEDURE — 99213 OFFICE O/P EST LOW 20 MIN: CPT | Performed by: FAMILY MEDICINE

## 2022-06-02 RX ORDER — DOXYCYCLINE HYCLATE 100 MG/1
100 CAPSULE ORAL EVERY 12 HOURS SCHEDULED
Qty: 14 CAPSULE | Refills: 0 | Status: SHIPPED | OUTPATIENT
Start: 2022-06-02 | End: 2022-06-09

## 2022-06-02 NOTE — PATIENT INSTRUCTIONS
Prednisone 10mg 3 tabs for 2 days, 2 tabs for 2 days, 1 tab for 2 days    Schedule with ent  Doxycycline hyclate 1 tab twice a day

## 2022-06-02 NOTE — PROGRESS NOTES
Assessment/Plan:      1  Recurrent acute serous otitis media of right ear  Assessment & Plan:  Trial pred taper, if no help, restart doxycycline and schedule with ent      2  Acute non-recurrent maxillary sinusitis  -     doxycycline hyclate (VIBRAMYCIN) 100 mg capsule; Take 1 capsule (100 mg total) by mouth every 12 (twelve) hours for 7 days        Subjective:  Chief Complaint   Patient presents with    Follow-up     Look her right ear, finish antibiotic, still feel her ear crack, she want to may sure don't  had any fluid cause no want the infection to come back,         Patient ID: Aurelio Mccray is a 60 y o  female  Complains of right ear pressure  On antibiotics resolved completely but now symptoms returning  Review of Systems   Constitutional: Negative  Negative for fatigue and fever  HENT: Positive for ear pain  Eyes: Negative  Respiratory: Negative  Negative for cough  Cardiovascular: Negative  Gastrointestinal: Negative  Endocrine: Negative  Genitourinary: Negative  Musculoskeletal: Negative  Skin: Negative  Allergic/Immunologic: Negative  Neurological: Negative  Psychiatric/Behavioral: Negative  The following portions of the patient's history were reviewed and updated as appropriate: allergies, current medications, past family history, past medical history, past social history, past surgical history and problem list     Objective:  Vitals:    06/02/22 0823   BP: 130/80   Pulse: 87   Temp: 98 3 °F (36 8 °C)   SpO2: 93%   Weight: 42 2 kg (93 lb)   Height: 5' (1 524 m)      Physical Exam  Vitals and nursing note reviewed  Constitutional:       Appearance: She is well-developed  HENT:      Head: Normocephalic and atraumatic  Left Ear: Tympanic membrane normal       Ears:      Comments: Right dull without erythema  Cardiovascular:      Rate and Rhythm: Normal rate and regular rhythm  Heart sounds: Normal heart sounds     Pulmonary: Effort: Pulmonary effort is normal       Breath sounds: Normal breath sounds  Abdominal:      General: Bowel sounds are normal       Palpations: Abdomen is soft  Skin:     General: Skin is warm and dry  Neurological:      Mental Status: She is alert and oriented to person, place, and time  Psychiatric:         Behavior: Behavior normal          Thought Content:  Thought content normal          Judgment: Judgment normal

## 2022-06-05 PROBLEM — J01.00 ACUTE NON-RECURRENT MAXILLARY SINUSITIS: Status: ACTIVE | Noted: 2022-06-05

## 2022-09-09 ENCOUNTER — TELEPHONE (OUTPATIENT)
Dept: FAMILY MEDICINE CLINIC | Facility: CLINIC | Age: 62
End: 2022-09-09

## 2022-09-09 NOTE — TELEPHONE ENCOUNTER
HAS TO BE BRAND NAME ONLY  Send to [address] [place] [address] rx request form  Phone [phone]    Fax     Brand Rhinocort AQ Nasal Spray (Budesonide)    32 mcg (120 doses/nasal spray)    Quantity 1 x 1    Refills 1 yr  Use one spray into each nostril daily  Form scanned into the chart      Please print out the RX and give to the front office people    thanks

## 2022-09-15 ENCOUNTER — CLINICAL SUPPORT (OUTPATIENT)
Dept: FAMILY MEDICINE CLINIC | Facility: CLINIC | Age: 62
End: 2022-09-15
Payer: COMMERCIAL

## 2022-09-15 DIAGNOSIS — E78.2 MIXED HYPERLIPIDEMIA: Primary | ICD-10-CM

## 2022-09-15 DIAGNOSIS — Z13.0 SCREENING, ANEMIA, DEFICIENCY, IRON: ICD-10-CM

## 2022-09-15 DIAGNOSIS — Z13.29 SCREENING FOR ENDOCRINE DISORDER: ICD-10-CM

## 2022-09-15 DIAGNOSIS — Z13.29 SCREENING FOR THYROID DISORDER: ICD-10-CM

## 2022-09-15 PROCEDURE — 36415 COLL VENOUS BLD VENIPUNCTURE: CPT

## 2022-09-16 LAB
ALBUMIN SERPL-MCNC: 4 G/DL (ref 3.6–5.1)
ALBUMIN/GLOB SERPL: 1.9 (CALC) (ref 1–2.5)
ALP SERPL-CCNC: 64 U/L (ref 37–153)
ALT SERPL-CCNC: 27 U/L (ref 6–29)
AST SERPL-CCNC: 28 U/L (ref 10–35)
BASOPHILS # BLD AUTO: 52 CELLS/UL (ref 0–200)
BASOPHILS NFR BLD AUTO: 1 %
BILIRUB SERPL-MCNC: 0.5 MG/DL (ref 0.2–1.2)
BUN SERPL-MCNC: 13 MG/DL (ref 7–25)
BUN/CREAT SERPL: NORMAL (CALC) (ref 6–22)
CALCIUM SERPL-MCNC: 9.4 MG/DL (ref 8.6–10.4)
CHLORIDE SERPL-SCNC: 105 MMOL/L (ref 98–110)
CHOLEST SERPL-MCNC: 259 MG/DL
CHOLEST/HDLC SERPL: 3 (CALC)
CO2 SERPL-SCNC: 30 MMOL/L (ref 20–32)
CREAT SERPL-MCNC: 0.79 MG/DL (ref 0.5–1.05)
EOSINOPHIL # BLD AUTO: 151 CELLS/UL (ref 15–500)
EOSINOPHIL NFR BLD AUTO: 2.9 %
ERYTHROCYTE [DISTWIDTH] IN BLOOD BY AUTOMATED COUNT: 12.7 % (ref 11–15)
GFR/BSA.PRED SERPLBLD CYS-BASED-ARV: 84 ML/MIN/1.73M2
GLOBULIN SER CALC-MCNC: 2.1 G/DL (CALC) (ref 1.9–3.7)
GLUCOSE SERPL-MCNC: 94 MG/DL (ref 65–99)
HCT VFR BLD AUTO: 43.8 % (ref 35–45)
HDLC SERPL-MCNC: 86 MG/DL
HGB BLD-MCNC: 15.5 G/DL (ref 11.7–15.5)
LDLC SERPL CALC-MCNC: 151 MG/DL (CALC)
LYMPHOCYTES # BLD AUTO: 1425 CELLS/UL (ref 850–3900)
LYMPHOCYTES NFR BLD AUTO: 27.4 %
MCH RBC QN AUTO: 32.4 PG (ref 27–33)
MCHC RBC AUTO-ENTMCNC: 35.4 G/DL (ref 32–36)
MCV RBC AUTO: 91.4 FL (ref 80–100)
MONOCYTES # BLD AUTO: 530 CELLS/UL (ref 200–950)
MONOCYTES NFR BLD AUTO: 10.2 %
NEUTROPHILS # BLD AUTO: 3042 CELLS/UL (ref 1500–7800)
NEUTROPHILS NFR BLD AUTO: 58.5 %
NONHDLC SERPL-MCNC: 173 MG/DL (CALC)
PLATELET # BLD AUTO: 225 THOUSAND/UL (ref 140–400)
PMV BLD REES-ECKER: 9.2 FL (ref 7.5–12.5)
POTASSIUM SERPL-SCNC: 3.8 MMOL/L (ref 3.5–5.3)
PROT SERPL-MCNC: 6.1 G/DL (ref 6.1–8.1)
RBC # BLD AUTO: 4.79 MILLION/UL (ref 3.8–5.1)
SODIUM SERPL-SCNC: 141 MMOL/L (ref 135–146)
TRIGL SERPL-MCNC: 102 MG/DL
TSH SERPL-ACNC: 1.42 MIU/L (ref 0.4–4.5)
WBC # BLD AUTO: 5.2 THOUSAND/UL (ref 3.8–10.8)

## 2022-09-19 ENCOUNTER — TELEPHONE (OUTPATIENT)
Dept: FAMILY MEDICINE CLINIC | Facility: CLINIC | Age: 62
End: 2022-09-19

## 2022-09-19 NOTE — TELEPHONE ENCOUNTER
----- Message from Isra Up DO sent at 9/17/2022  7:37 PM EDT -----  Your cholesterol is a little higher than last time  Your other labs are good

## 2022-10-11 PROBLEM — H65.01 ACUTE SEROUS OTITIS MEDIA OF RIGHT EAR: Status: RESOLVED | Noted: 2022-06-02 | Resolved: 2022-10-11

## 2022-10-11 PROBLEM — J01.00 ACUTE NON-RECURRENT MAXILLARY SINUSITIS: Status: RESOLVED | Noted: 2022-06-05 | Resolved: 2022-10-11

## 2022-10-12 PROBLEM — R19.7 DIARRHEA OF PRESUMED INFECTIOUS ORIGIN: Status: RESOLVED | Noted: 2021-08-11 | Resolved: 2022-10-12

## 2022-11-07 ENCOUNTER — OFFICE VISIT (OUTPATIENT)
Dept: FAMILY MEDICINE CLINIC | Facility: CLINIC | Age: 62
End: 2022-11-07

## 2022-11-07 VITALS
BODY MASS INDEX: 18.85 KG/M2 | SYSTOLIC BLOOD PRESSURE: 130 MMHG | HEART RATE: 89 BPM | OXYGEN SATURATION: 95 % | HEIGHT: 60 IN | DIASTOLIC BLOOD PRESSURE: 80 MMHG | TEMPERATURE: 98.6 F | WEIGHT: 96 LBS

## 2022-11-07 DIAGNOSIS — J40 BRONCHITIS: Primary | ICD-10-CM

## 2022-11-07 RX ORDER — ALBUTEROL SULFATE 90 UG/1
2 AEROSOL, METERED RESPIRATORY (INHALATION) EVERY 6 HOURS PRN
Qty: 6.7 G | Refills: 0 | Status: SHIPPED | OUTPATIENT
Start: 2022-11-07

## 2022-11-07 RX ORDER — PREDNISONE 10 MG/1
TABLET ORAL
Qty: 20 TABLET | Refills: 0 | Status: SHIPPED | OUTPATIENT
Start: 2022-11-07 | End: 2022-11-18 | Stop reason: SDUPTHER

## 2022-11-07 RX ORDER — DOXYCYCLINE 100 MG/1
100 CAPSULE ORAL 2 TIMES DAILY
Qty: 20 CAPSULE | Refills: 0 | Status: SHIPPED | OUTPATIENT
Start: 2022-11-07 | End: 2022-11-07 | Stop reason: ALTCHOICE

## 2022-11-07 RX ORDER — DOXYCYCLINE HYCLATE 100 MG/1
100 CAPSULE ORAL EVERY 12 HOURS SCHEDULED
Qty: 20 CAPSULE | Refills: 0 | Status: SHIPPED | OUTPATIENT
Start: 2022-11-07 | End: 2022-11-17

## 2022-11-07 NOTE — PATIENT INSTRUCTIONS
Doxycycline 1 tab twice a day  Albuterol inhaler 2 puffs every 4 hours as needed  Prednisone taper  Fluids  Mucinex or robitussin dm

## 2022-11-07 NOTE — LETTER
Bill Anguiano   1960  Date of office visit 2022    Height 5'  Weight 96 lbs  bmi 18 75  Bp 130/80   Pulse 89    Yolanda is a patient in our office  She is medically cleared and physically fit to perform all duties of her job without restrictions       Sincerely,      GERHARD Butler

## 2022-11-07 NOTE — PROGRESS NOTES
Assessment/Plan:      1  Bronchitis  Assessment & Plan:  Pt states she tolerates doxycycline the best  Albuterol inhaler as needed  pred taper if symptoms ongoing    Orders:  -     albuterol (Proventil HFA) 90 mcg/act inhaler; Inhale 2 puffs every 6 (six) hours as needed for wheezing  -     predniSONE 10 mg tablet; 4 tabs for 2 days, 3 tabs for 2 days, 2 tabs for 2 days and 1 tab for 2 days        Subjective:  Chief Complaint   Patient presents with   • Cough     Chest congestion , ear , no fever , home covid test negative 11/05/2022 and 11/07/2022 both negative   Symptoms start Saturday         Patient ID: Lana Talavera is a 60 y o  female  Pt started with symptoms over the weekend  It went into her chest  She has a Productive cough, green phlegm this am, and chest tightness  Ear pressure  No head congestion  Took 2 covid tests- negative   with similar symptoms    Cough  Pertinent negatives include no fever or shortness of breath  Review of Systems   Constitutional: Positive for fatigue  Negative for fever  HENT: Negative  Eyes: Negative  Respiratory: Positive for cough  Negative for shortness of breath  Cardiovascular: Negative  Gastrointestinal: Negative  Endocrine: Negative  Genitourinary: Negative  Musculoskeletal: Negative  Skin: Negative  Allergic/Immunologic: Negative  Neurological: Negative  Psychiatric/Behavioral: Negative  The following portions of the patient's history were reviewed and updated as appropriate: allergies, current medications, past family history, past medical history, past social history, past surgical history and problem list     Objective:  Vitals:    11/07/22 1046   BP: 130/80   Pulse: 89   Temp: 98 6 °F (37 °C)   TempSrc: Tympanic   SpO2: 95%   Weight: 43 5 kg (96 lb)   Height: 5' (1 524 m)      Physical Exam  Vitals and nursing note reviewed  Constitutional:       Appearance: She is well-developed     HENT: Head: Normocephalic and atraumatic  Cardiovascular:      Rate and Rhythm: Normal rate and regular rhythm  Heart sounds: Normal heart sounds  Pulmonary:      Effort: Pulmonary effort is normal       Breath sounds: Normal breath sounds  Abdominal:      General: Bowel sounds are normal       Palpations: Abdomen is soft  Skin:     General: Skin is warm and dry  Neurological:      Mental Status: She is alert and oriented to person, place, and time  Psychiatric:         Behavior: Behavior normal          Thought Content:  Thought content normal          Judgment: Judgment normal

## 2022-11-08 NOTE — ASSESSMENT & PLAN NOTE
Pt states she tolerates doxycycline the best  Albuterol inhaler as needed   pred taper if symptoms ongoing

## 2022-11-17 ENCOUNTER — TELEPHONE (OUTPATIENT)
Dept: FAMILY MEDICINE CLINIC | Facility: CLINIC | Age: 62
End: 2022-11-17

## 2022-11-17 NOTE — TELEPHONE ENCOUNTER
Patient states that her symptoms have not totally resolved from when she saw you last   Her ears still feel full  Are you able to send in another abx/steroids to help resolve this? [facility] [place]

## 2022-11-17 NOTE — TELEPHONE ENCOUNTER
Patient request a call back in regards to physical paper work and other personal did not want to specific

## 2022-11-18 DIAGNOSIS — J40 BRONCHITIS: Primary | ICD-10-CM

## 2022-11-18 RX ORDER — AZITHROMYCIN 250 MG/1
TABLET, FILM COATED ORAL
Qty: 6 TABLET | Refills: 0 | Status: SHIPPED | OUTPATIENT
Start: 2022-11-18 | End: 2022-11-21 | Stop reason: ALTCHOICE

## 2022-11-18 RX ORDER — PREDNISONE 10 MG/1
TABLET ORAL
Qty: 20 TABLET | Refills: 0 | Status: SHIPPED | OUTPATIENT
Start: 2022-11-18

## 2022-11-21 DIAGNOSIS — J01.00 ACUTE NON-RECURRENT MAXILLARY SINUSITIS: Primary | ICD-10-CM

## 2022-11-21 RX ORDER — DOXYCYCLINE HYCLATE 100 MG/1
100 CAPSULE ORAL EVERY 12 HOURS SCHEDULED
Qty: 20 CAPSULE | Refills: 0 | Status: SHIPPED | OUTPATIENT
Start: 2022-11-21 | End: 2022-11-29

## 2022-11-29 ENCOUNTER — OFFICE VISIT (OUTPATIENT)
Dept: FAMILY MEDICINE CLINIC | Facility: CLINIC | Age: 62
End: 2022-11-29

## 2022-11-29 VITALS
DIASTOLIC BLOOD PRESSURE: 70 MMHG | SYSTOLIC BLOOD PRESSURE: 124 MMHG | BODY MASS INDEX: 18.65 KG/M2 | OXYGEN SATURATION: 95 % | TEMPERATURE: 98 F | HEIGHT: 60 IN | WEIGHT: 95 LBS | HEART RATE: 77 BPM

## 2022-11-29 DIAGNOSIS — E78.2 MIXED HYPERLIPIDEMIA: ICD-10-CM

## 2022-11-29 DIAGNOSIS — M81.0 OSTEOPOROSIS WITHOUT CURRENT PATHOLOGICAL FRACTURE, UNSPECIFIED OSTEOPOROSIS TYPE: ICD-10-CM

## 2022-11-29 DIAGNOSIS — Z00.00 ANNUAL PHYSICAL EXAM: Primary | ICD-10-CM

## 2022-11-29 DIAGNOSIS — Z11.1 ENCOUNTER FOR PPD TEST: ICD-10-CM

## 2022-11-29 PROBLEM — U07.1 COVID-19: Status: RESOLVED | Noted: 2022-05-17 | Resolved: 2022-11-29

## 2022-11-29 NOTE — PROGRESS NOTES
[facility]    NAME: Krishna Gao  AGE: 60 y o  SEX: female  : 1960     DATE: 2022     Assessment and Plan:     Problem List Items Addressed This Visit        Musculoskeletal and Integument    Osteoporosis     Does not want to do injectables or oral  Will continue calcium and vitamin D  Weight bearing exercises with walking            Other    Hyperlipidemia     Labs stable will continue to monitor        Other Visit Diagnoses     Annual physical exam    -  Primary    Encounter for PPD test        Relevant Orders    TB Skin Test (Completed)          Immunizations and preventive care screenings were discussed with patient today  Appropriate education was printed on patient's after visit summary  Counseling:  Alcohol/drug use: discussed moderation in alcohol intake, the recommendations for healthy alcohol use, and avoidance of illicit drug use  Dental Health: discussed importance of regular tooth brushing, flossing, and dental visits  Injury prevention: discussed safety/seat belts, safety helmets, smoke detectors, carbon dioxide detectors, and smoking near bedding or upholstery  Sexual health: discussed sexually transmitted diseases, partner selection, use of condoms, avoidance of unintended pregnancy, and contraceptive alternatives  · Exercise: the importance of regular exercise/physical activity was discussed  Recommend exercise 3-5 times per week for at least 30 minutes  No follow-ups on file  Chief Complaint:     Chief Complaint   Patient presents with   • Physical Exam     Pt here for physical, form, for work,       History of Present Illness:     Adult Annual Physical   Patient here for a comprehensive physical exam  The patient reports no problems      Needs 2 step PPD for work    Labs 9/15/22- al normal except  HDL 86 ,     Colonoscopy 21- repeat 5 years  Mammogram 1/13/2022- gets orders from [facility] 1- 1/7/21- osteoporosis- will get repeat order from [address] with GYN yearly- going next week    Diet and Physical Activity  · Diet/Nutrition: well balanced diet  · Exercise: no formal exercise  Depression Screening  PHQ-2/9 Depression Screening    Little interest or pleasure in doing things: 0 - not at all  Feeling down, depressed, or hopeless: 0 - not at all  PHQ-2 Score: 0  PHQ-2 Interpretation: Negative depression screen       General Health  · Sleep: sleeps well  · Hearing: normal - bilateral   · Vision: goes for regular eye exams, most recent eye exam <1 year ago and wears glasses  · Dental: regular dental visits  /GYN Health  · Patient is: postmenopausal       Review of Systems:     Review of Systems   Constitutional: Negative  HENT: Negative  Eyes: Negative  Respiratory: Negative  Cardiovascular: Negative  Gastrointestinal: Negative  Endocrine: Negative  Genitourinary: Negative  Musculoskeletal: Positive for arthralgias  Skin: Negative  Neurological: Negative  Hematological: Negative  Psychiatric/Behavioral: Negative         Past Medical History:     Past Medical History:   Diagnosis Date   • Colitis    • COVID-19 5/17/2022   • Nephrolithiasis    • Sessile colonic polyp     last assessed 9/1/16   • TMJ (temporomandibular joint disorder)       Past Surgical History:     Past Surgical History:   Procedure Laterality Date   • APPENDECTOMY     • COLONOSCOPY      fiberoptic/screening - last assessed 9/2/14   • COLPOSCOPY      cervix w/bx w/endocervical curettage    • MANDIBLE FRACTURE SURGERY      jaw surgery - last assessed 9/2/14 - this was for the TMJ (Dr Humberto Abraham)   • TONSILLECTOMY        Social History:     Social History     Socioeconomic History   • Marital status: /Civil Union     Spouse name: None   • Number of children: 2   • Years of education: None   • Highest education level: None   Occupational History   • None   Tobacco Use   • Smoking status: Every Day     Packs/day: 0 50     Types: Cigarettes   • Smokeless tobacco: Never   Vaping Use   • Vaping Use: Never used   Substance and Sexual Activity   • Alcohol use: Yes     Comment: social    • Drug use: No   • Sexual activity: Yes     Partners: Male     Birth control/protection: None   Other Topics Concern   • None   Social History Narrative    Does not use caffeine        Uses seat belt    [employer]    Denied hx of caffeine use     Social Determinants of Health     Financial Resource Strain: Not on file   Food Insecurity: Not on file   Transportation Needs: Not on file   Physical Activity: Not on file   Stress: Not on file   Social Connections: Not on file   Intimate Partner Violence: Not on file   Housing Stability: Not on file      Family History:     Family History   Problem Relation Age of Onset   • Stroke Father         CVA   • No Known Problems Mother    • No Known Problems Sister    • No Known Problems Daughter    • No Known Problems Maternal Grandmother    • No Known Problems Maternal Grandfather    • No Known Problems Paternal Grandmother    • No Known Problems Paternal Grandfather    • No Known Problems Daughter    • No Known Problems Paternal Aunt    • No Known Problems Paternal Aunt    • Breast cancer Cousin         52's   • Cirrhosis Neg Hx    • Colon cancer Neg Hx    • Colon polyps Neg Hx       Current Medications:     Current Outpatient Medications   Medication Sig Dispense Refill   • aspirin 325 mg tablet Take 325 mg by mouth daily     • CALCIUM CITRATE PO Take 500 mg by mouth     • cholecalciferol (VITAMIN D3) 1,000 units tablet Take 1,000 Units by mouth daily     • Multiple Vitamin (MULTIVITAMIN) tablet Take 1 tablet by mouth daily     • albuterol (Proventil HFA) 90 mcg/act inhaler Inhale 2 puffs every 6 (six) hours as needed for wheezing (Patient not taking: Reported on 11/29/2022) 6 7 g 0     No current facility-administered medications for this visit  Allergies: Allergies   Allergen Reactions   • Cefaclor Hives     Category: Allergy;    • Cephalexin Nausea Only     Reaction Date: 28Apr2011; Category: Adverse Reaction;    • Eggs Or Egg-Derived Products - Food Allergy Other (See Comments)     congestion   • Avelox [Moxifloxacin] Palpitations     Reaction Date: [id number]; Category: Adverse Reaction;       Physical Exam:     /70   Pulse 77   Temp 98 °F (36 7 °C)   Ht 5' (1 524 m)   Wt 43 1 kg (95 lb)   SpO2 95%   BMI 18 55 kg/m²     Physical Exam  Vitals and nursing note reviewed  Constitutional:       General: She is not in acute distress  Appearance: Normal appearance  She is well-developed and normal weight  HENT:      Head: Normocephalic and atraumatic  Right Ear: Tympanic membrane, ear canal and external ear normal       Left Ear: Tympanic membrane, ear canal and external ear normal       Nose: Nose normal       Mouth/Throat:      Mouth: Mucous membranes are moist       Pharynx: Oropharynx is clear  Eyes:      Conjunctiva/sclera: Conjunctivae normal       Pupils: Pupils are equal, round, and reactive to light  Cardiovascular:      Rate and Rhythm: Normal rate and regular rhythm  Heart sounds: Normal heart sounds  No murmur heard  Pulmonary:      Effort: Pulmonary effort is normal  No respiratory distress  Breath sounds: Normal breath sounds  Abdominal:      General: Abdomen is flat  Bowel sounds are normal       Palpations: Abdomen is soft  Tenderness: There is no abdominal tenderness  Musculoskeletal:      Cervical back: Neck supple  Right lower leg: No edema  Left lower leg: No edema  Lymphadenopathy:      Cervical: No cervical adenopathy  Skin:     General: Skin is warm and dry  Capillary Refill: Capillary refill takes less than 2 seconds  Neurological:      Mental Status: She is alert and oriented to person, place, and time     Psychiatric:         Mood and Affect: Mood normal          Behavior: Behavior normal          Thought Content:  Thought content normal          Judgment: Judgment normal           Ebony Majano, [facility]

## 2022-11-29 NOTE — ASSESSMENT & PLAN NOTE
Does not want to do injectables or oral  Will continue calcium and vitamin D  Weight bearing exercises with walking

## 2022-11-29 NOTE — PATIENT INSTRUCTIONS

## 2022-12-01 ENCOUNTER — ANNUAL EXAM (OUTPATIENT)
Dept: GYNECOLOGY | Facility: CLINIC | Age: 62
End: 2022-12-01

## 2022-12-01 ENCOUNTER — CLINICAL SUPPORT (OUTPATIENT)
Dept: FAMILY MEDICINE CLINIC | Facility: CLINIC | Age: 62
End: 2022-12-01

## 2022-12-01 VITALS
DIASTOLIC BLOOD PRESSURE: 84 MMHG | SYSTOLIC BLOOD PRESSURE: 132 MMHG | BODY MASS INDEX: 18.53 KG/M2 | WEIGHT: 94.4 LBS | HEIGHT: 60 IN

## 2022-12-01 DIAGNOSIS — Z98.890 HISTORY OF COLONOSCOPY WITH POLYPECTOMY: ICD-10-CM

## 2022-12-01 DIAGNOSIS — Z01.419 WOMEN'S ANNUAL ROUTINE GYNECOLOGICAL EXAMINATION: ICD-10-CM

## 2022-12-01 DIAGNOSIS — Z86.010 HISTORY OF COLONOSCOPY WITH POLYPECTOMY: ICD-10-CM

## 2022-12-01 DIAGNOSIS — N95.1 MENOPAUSAL STATE: ICD-10-CM

## 2022-12-01 DIAGNOSIS — Z11.1 ENCOUNTER FOR PPD SKIN TEST READING: ICD-10-CM

## 2022-12-01 DIAGNOSIS — Z12.31 ENCOUNTER FOR SCREENING MAMMOGRAM FOR BREAST CANCER: ICD-10-CM

## 2022-12-01 DIAGNOSIS — Z87.39 HISTORY OF OSTEOPOROSIS: ICD-10-CM

## 2022-12-01 LAB
INDURATION: 0 MM
TB SKIN TEST: NEGATIVE

## 2022-12-01 NOTE — PROGRESS NOTES
Assessment    Annual well-woman exam   Menopausal state, asymptomatic   History osteoporosis, history of fractures   History of colonoscopy polypectomy         Plan    Screening mammography ordered   Continue supplemental calcium vitamin-D   Colon cancer screening, up-to-date   Normal Pap in  next due in    All questions answered  Subjective here for annual exam     Elio Dickerson is a 60 y o  female who presents for annual exam   Menopausal state, she is still sexually active, denies pelvic pain symptoms unusual discharge or vaginal bleeding  Encouraged weight-bearing exercise  The patient reports that there is not domestic violence in her life  Current contraception: post menopausal status  History of abnormal Pap smear: no  Family history of uterine or ovarian cancer: no  Regular self breast exam: yes  History of abnormal mammogram: no  Family history of breast cancer: yes - paternal cousins  History of abnormal lipids: no  Menstrual History:  OB History        2    Para   2    Term   2            AB        Living   2       SAB        IAB        Ectopic        Multiple        Live Births                    Menarche age: 14  No LMP recorded  Patient is postmenopausal      Review of Systems  Pertinent items are noted in HPI        Objective no acute distress   /84 (BP Location: Left arm, Patient Position: Sitting, Cuff Size: Standard)   Ht 5' (1 524 m)   Wt 42 8 kg (94 lb 6 4 oz)   BMI 18 44 kg/m²     General:   alert and oriented, in no acute distress, alert, appears stated age and cooperative   Heart: regular rate and rhythm, S1, S2 normal, no murmur, click, rub or gallop   Lungs: clear to auscultation bilaterally   Abdomen: soft, non-tender, without masses or organomegaly   Vulva: normal, Bartholin's, Urethra, Nageezi's normal, female escutcheon   Vagina: normal mucosa, normal discharge, no palpable nodules   Cervix: anteverted, multiparous appearance, no cervical motion tenderness and no lesions   Uterus: normal size, anteverted, mobile, non-tender, normal shape and consistency   Adnexa: normal adnexa and no mass, fullness, tenderness   Bilateral breast exam in the sitting and supine position with chaperone present, no visible or palpable breast lesions identified  No breast masses noted  No supraclavicular or axillary lymphadenopathy noted  No nipple discharge  Reviewed self-breast exam techniques     Rectal exam,  deferred

## 2022-12-13 ENCOUNTER — CLINICAL SUPPORT (OUTPATIENT)
Dept: FAMILY MEDICINE CLINIC | Facility: CLINIC | Age: 62
End: 2022-12-13

## 2022-12-13 DIAGNOSIS — Z11.1 ENCOUNTER FOR PPD TEST: Primary | ICD-10-CM

## 2022-12-15 ENCOUNTER — CLINICAL SUPPORT (OUTPATIENT)
Dept: FAMILY MEDICINE CLINIC | Facility: CLINIC | Age: 62
End: 2022-12-15

## 2022-12-15 DIAGNOSIS — Z11.1 ENCOUNTER FOR PPD SKIN TEST READING: ICD-10-CM

## 2022-12-15 LAB
INDURATION: 0 MM
TB SKIN TEST: NEGATIVE

## 2023-01-25 NOTE — TELEPHONE ENCOUNTER
lmtcb Otezla Counseling: The side effects of Otezla were discussed with the patient, including but not limited to worsening or new depression, weight loss, diarrhea, nausea, upper respiratory tract infection, and headache. Patient instructed to call the office should any adverse effect occur.  The patient verbalized understanding of the proper use and possible adverse effects of Otezla.  All the patient's questions and concerns were addressed.

## 2023-02-16 ENCOUNTER — HOSPITAL ENCOUNTER (OUTPATIENT)
Dept: MAMMOGRAPHY | Facility: IMAGING CENTER | Age: 63
Discharge: HOME | End: 2023-02-16

## 2023-02-16 VITALS — BODY MASS INDEX: 19.04 KG/M2 | HEIGHT: 60 IN | WEIGHT: 97 LBS

## 2023-02-16 DIAGNOSIS — Z12.31 ENCOUNTER FOR SCREENING MAMMOGRAM FOR BREAST CANCER: ICD-10-CM

## 2023-04-16 PROBLEM — J01.01 ACUTE RECURRENT MAXILLARY SINUSITIS: Status: ACTIVE | Noted: 2023-04-16

## 2023-06-15 PROBLEM — J01.01 ACUTE RECURRENT MAXILLARY SINUSITIS: Status: RESOLVED | Noted: 2023-04-16 | Resolved: 2023-06-15

## 2023-10-16 ENCOUNTER — OFFICE VISIT (OUTPATIENT)
Dept: FAMILY MEDICINE CLINIC | Facility: CLINIC | Age: 63
End: 2023-10-16
Payer: COMMERCIAL

## 2023-10-16 VITALS
DIASTOLIC BLOOD PRESSURE: 80 MMHG | TEMPERATURE: 97.4 F | BODY MASS INDEX: 18.61 KG/M2 | HEIGHT: 60 IN | WEIGHT: 94.8 LBS | OXYGEN SATURATION: 95 % | HEART RATE: 88 BPM | SYSTOLIC BLOOD PRESSURE: 124 MMHG

## 2023-10-16 DIAGNOSIS — Z13.0 SCREENING, ANEMIA, DEFICIENCY, IRON: ICD-10-CM

## 2023-10-16 DIAGNOSIS — E78.2 MIXED HYPERLIPIDEMIA: ICD-10-CM

## 2023-10-16 DIAGNOSIS — Z13.29 SCREENING FOR THYROID DISORDER: ICD-10-CM

## 2023-10-16 DIAGNOSIS — Z13.29 SCREENING FOR ENDOCRINE DISORDER: ICD-10-CM

## 2023-10-16 DIAGNOSIS — J20.9 ACUTE BRONCHITIS, UNSPECIFIED ORGANISM: Primary | ICD-10-CM

## 2023-10-16 PROCEDURE — 99213 OFFICE O/P EST LOW 20 MIN: CPT | Performed by: NURSE PRACTITIONER

## 2023-10-16 RX ORDER — DOXYCYCLINE HYCLATE 100 MG/1
100 CAPSULE ORAL EVERY 12 HOURS SCHEDULED
Qty: 20 CAPSULE | Refills: 0 | Status: SHIPPED | OUTPATIENT
Start: 2023-10-16 | End: 2023-10-26

## 2023-10-16 RX ORDER — PREDNISONE 10 MG/1
TABLET ORAL
Qty: 20 TABLET | Refills: 0 | Status: SHIPPED | OUTPATIENT
Start: 2023-10-16 | End: 2023-10-24

## 2023-10-16 RX ORDER — ALBUTEROL SULFATE 90 UG/1
2 AEROSOL, METERED RESPIRATORY (INHALATION) EVERY 6 HOURS PRN
Qty: 8.5 G | Refills: 0 | Status: SHIPPED | OUTPATIENT
Start: 2023-10-16

## 2023-10-16 NOTE — PROGRESS NOTES
Name: Jacob Carson      : 1960      MRN: [id number]  Encounter Provider: CASEY Narvaez  Encounter Date: 10/16/2023   Encounter department: [address]     1. Acute bronchitis, unspecified organism  Assessment & Plan:  Pt states she tolerates doxycycline the best. Albuterol inhaler as needed. Prednisone as directed    Orders:  -     doxycycline hyclate (VIBRAMYCIN) 100 mg capsule; Take 1 capsule (100 mg total) by mouth every 12 (twelve) hours for 10 days  -     predniSONE 10 mg tablet; Take 4 tablets (40 mg total) by mouth daily for 2 days, THEN 3 tablets (30 mg total) daily for 2 days, THEN 2 tablets (20 mg total) daily for 2 days, THEN 1 tablet (10 mg total) daily for 2 days. -     albuterol (Proventil HFA) 90 mcg/act inhaler; Inhale 2 puffs every 6 (six) hours as needed for wheezing    2. Screening for endocrine disorder  -     Comprehensive metabolic panel; Future  -     Comprehensive metabolic panel    3. Mixed hyperlipidemia  -     Lipid Panel with Direct LDL reflex; Future  -     Lipid Panel with Direct LDL reflex    4. Screening, anemia, deficiency, iron  -     CBC and differential; Future  -     CBC and differential    5. Screening for thyroid disorder  -     TSH, 3rd generation with Free T4 reflex; Future  -     TSH, 3rd generation with Free T4 reflex        Depression Screening and Follow-up Plan: Patient was screened for depression during today's encounter. They screened negative with a PHQ-2 score of 0. Subjective        Started Saturday  night with cough, productive, no fevers. No sore throat. Headache, sinus pressure with cough. COVID negative at home. Has been taking rhinocort. No ear issues, normal appetite, + fatigue, sleeping a lot. No abd pain,n v, d. Has albuterol at home, needs refill. Review of Systems   Constitutional:  Positive for fatigue. Negative for fever.    HENT:  Positive for sinus pressure and sore throat. Negative for ear pain. Respiratory:  Positive for cough. Cardiovascular: Negative. Gastrointestinal: Negative. Skin: Negative. Neurological:  Positive for headaches. Hematological: Negative. Current Outpatient Medications on File Prior to Visit   Medication Sig   • aspirin 325 mg tablet Take 325 mg by mouth daily   • CALCIUM CITRATE PO Take 500 mg by mouth   • cholecalciferol (VITAMIN D3) 1,000 units tablet Take 1,000 Units by mouth daily   • Multiple Vitamin (MULTIVITAMIN) tablet Take 1 tablet by mouth daily   • predniSONE 10 mg tablet 4 tabs for 2 days, 3 tabs for 2 days, 2 tabs for 2 days and 1 tab for 2 days (Patient not taking: Reported on 10/16/2023)       Objective     /80   Pulse 88   Temp (!) 97.4 °F (36.3 °C)   Ht 5' (1.524 m)   Wt 43 kg (94 lb 12.8 oz)   SpO2 95%   BMI 18.51 kg/m²     Physical Exam  Constitutional:       General: She is not in acute distress. Appearance: Normal appearance. She is not ill-appearing. HENT:      Head: Normocephalic. Right Ear: Tympanic membrane, ear canal and external ear normal.      Left Ear: Tympanic membrane, ear canal and external ear normal.      Nose: Congestion and rhinorrhea present. Mouth/Throat:      Mouth: Mucous membranes are moist.      Pharynx: Posterior oropharyngeal erythema present. Eyes:      General: No scleral icterus. Right eye: No discharge. Left eye: No discharge. Extraocular Movements: Extraocular movements intact. Conjunctiva/sclera: Conjunctivae normal.      Pupils: Pupils are equal, round, and reactive to light. Cardiovascular:      Rate and Rhythm: Normal rate and regular rhythm. Heart sounds: Normal heart sounds. No murmur heard. Pulmonary:      Effort: Pulmonary effort is normal.      Breath sounds: Normal breath sounds. No wheezing or rhonchi. Abdominal:      Palpations: Abdomen is soft. Tenderness: There is no abdominal tenderness. Musculoskeletal:      Cervical back: Neck supple. Skin:     General: Skin is warm. Findings: No rash. Neurological:      Mental Status: She is alert and oriented to person, place, and time.    Psychiatric:         Mood and Affect: Mood normal.         Behavior: Behavior normal.       Coco Aaron

## 2023-10-17 ENCOUNTER — TELEPHONE (OUTPATIENT)
Dept: FAMILY MEDICINE CLINIC | Facility: CLINIC | Age: 63
End: 2023-10-17

## 2023-10-17 NOTE — TELEPHONE ENCOUNTER
Incoming fax to [address] [place] [address] requesting refill for Rhinocourt AQ Nasal Spray (Budesonide) 32 mcg (120 doses/nasal spray), for a 3 month supply with refills prn x1 year. Fax form to be signed and faxed back is on your desk.

## 2023-10-18 PROBLEM — J20.9 ACUTE BRONCHITIS: Status: ACTIVE | Noted: 2022-11-07

## 2023-11-06 ENCOUNTER — HOSPITAL ENCOUNTER (OUTPATIENT)
Dept: RADIOLOGY | Facility: HOSPITAL | Age: 63
Discharge: HOME | End: 2023-11-06
Payer: COMMERCIAL

## 2023-11-06 ENCOUNTER — TELEPHONE (OUTPATIENT)
Dept: FAMILY MEDICINE CLINIC | Facility: CLINIC | Age: 63
End: 2023-11-06

## 2023-11-06 ENCOUNTER — OFFICE VISIT (OUTPATIENT)
Dept: FAMILY MEDICINE CLINIC | Facility: CLINIC | Age: 63
End: 2023-11-06
Payer: COMMERCIAL

## 2023-11-06 VITALS
OXYGEN SATURATION: 94 % | TEMPERATURE: 97.5 F | HEART RATE: 85 BPM | DIASTOLIC BLOOD PRESSURE: 62 MMHG | HEIGHT: 60 IN | WEIGHT: 93 LBS | SYSTOLIC BLOOD PRESSURE: 120 MMHG | BODY MASS INDEX: 18.26 KG/M2

## 2023-11-06 DIAGNOSIS — R05.1 ACUTE COUGH: ICD-10-CM

## 2023-11-06 DIAGNOSIS — R05.1 ACUTE COUGH: Primary | ICD-10-CM

## 2023-11-06 DIAGNOSIS — J20.9 ACUTE BRONCHITIS, UNSPECIFIED ORGANISM: Primary | ICD-10-CM

## 2023-11-06 DIAGNOSIS — J01.01 ACUTE RECURRENT MAXILLARY SINUSITIS: ICD-10-CM

## 2023-11-06 PROCEDURE — 99213 OFFICE O/P EST LOW 20 MIN: CPT | Performed by: FAMILY MEDICINE

## 2023-11-06 PROCEDURE — 71046 X-RAY EXAM CHEST 2 VIEWS: CPT

## 2023-11-06 RX ORDER — DOXYCYCLINE HYCLATE 100 MG/1
100 CAPSULE ORAL EVERY 12 HOURS SCHEDULED
Qty: 20 CAPSULE | Refills: 0 | Status: SHIPPED | OUTPATIENT
Start: 2023-11-06 | End: 2023-11-07

## 2023-11-06 RX ORDER — PREDNISONE 10 MG/1
TABLET ORAL
Qty: 20 TABLET | Refills: 0 | Status: SHIPPED | OUTPATIENT
Start: 2023-11-06 | End: 2023-11-07

## 2023-11-06 NOTE — TELEPHONE ENCOUNTER
Pt is calling because she was in on 10/16 for coughing and was prescribed an antibiotic. Pt said she took the antibiotic for the 10 days and the cough came back. Pt said she also feels tightness in her chest. Pt is scheduled to come in today to see if she would need a chest Xray. Pt said she had pneumonia before and would like to try catch whatever it is she has early on.     Please advise

## 2023-11-06 NOTE — PROGRESS NOTES
Assessment/Plan:      1. Acute bronchitis, unspecified organism  Assessment & Plan:  Pt noticed improvement on doxycycline. Seemed to be resolved then started with symptoms again with productive cough. Does not want it to go into pneumonia. Orders:  -     doxycycline hyclate (Vibramycin) 100 mg capsule; Take 1 capsule (100 mg total) by mouth every 12 (twelve) hours for 10 days    2. Acute recurrent maxillary sinusitis  Assessment & Plan:  Restart doxycycline 1 tab twice a day. Orders:  -     predniSONE 10 mg tablet; 4 tabs for 2 days, 3 tabs for 2 days, 2 tabs for 2 days and 1 tab for 2 days          Subjective:  Chief Complaint   Patient presents with    Cough     Cough and spitting up green mucus and going to ask for chest x ray coughing and started again on Saturday most at night, ear pain ear feel congested no fever, tried          Patient ID: Mckayla Connelly is a 58 y.o. female. Pt complains of productive cough and seen at [facility]. Cough  Pertinent negatives include no fever. Review of Systems   Constitutional: Negative. Negative for fatigue and fever. HENT: Negative. Eyes: Negative. Respiratory:  Positive for cough. Cardiovascular: Negative. Gastrointestinal: Negative. Endocrine: Negative. Genitourinary: Negative. Musculoskeletal: Negative. Skin: Negative. Allergic/Immunologic: Negative. Neurological: Negative. Psychiatric/Behavioral: Negative. The following portions of the patient's history were reviewed and updated as appropriate: allergies, current medications, past family history, past medical history, past social history, past surgical history and problem list.    Objective:  Vitals:    11/06/23 1357   BP: 120/62   Pulse: 85   Temp: 97.5 °F (36.4 °C)   TempSrc: Tympanic   SpO2: 94%   Weight: 42.2 kg (93 lb)   Height: 5' (1.524 m)      Physical Exam  Vitals and nursing note reviewed.    Constitutional:       Appearance: She is well-developed. HENT:      Head: Normocephalic and atraumatic. Cardiovascular:      Rate and Rhythm: Normal rate and regular rhythm. Heart sounds: Normal heart sounds. Pulmonary:      Effort: Pulmonary effort is normal.      Breath sounds: Normal breath sounds. Abdominal:      General: Bowel sounds are normal.      Palpations: Abdomen is soft. Skin:     General: Skin is warm and dry. Neurological:      Mental Status: She is alert and oriented to person, place, and time. Psychiatric:         Behavior: Behavior normal.         Thought Content:  Thought content normal.         Judgment: Judgment normal.

## 2023-11-07 DIAGNOSIS — J20.9 ACUTE BRONCHITIS, UNSPECIFIED ORGANISM: ICD-10-CM

## 2023-11-07 DIAGNOSIS — J01.01 ACUTE RECURRENT MAXILLARY SINUSITIS: ICD-10-CM

## 2023-11-07 RX ORDER — PREDNISONE 10 MG/1
TABLET ORAL
Qty: 20 TABLET | Refills: 0 | Status: SHIPPED | OUTPATIENT
Start: 2023-11-07

## 2023-11-07 RX ORDER — DOXYCYCLINE HYCLATE 100 MG/1
100 CAPSULE ORAL EVERY 12 HOURS
Qty: 20 CAPSULE | Refills: 0 | Status: SHIPPED | OUTPATIENT
Start: 2023-11-07 | End: 2023-11-17

## 2023-11-07 NOTE — ASSESSMENT & PLAN NOTE
Pt noticed improvement on doxycycline. Seemed to be resolved then started with symptoms again with productive cough. Does not want it to go into pneumonia.

## 2023-11-08 ENCOUNTER — TELEPHONE (OUTPATIENT)
Dept: FAMILY MEDICINE CLINIC | Facility: CLINIC | Age: 63
End: 2023-11-08

## 2023-11-08 NOTE — TELEPHONE ENCOUNTER
Transcript from [place]. Please review and advise. Hi, this is Irl Haven calling. I did see the results of my chest X-ray. My birth date is [phone]. I was there to see Doctor Michel Ortiz. I don't know if she's in today, but I do have some questions. If you could call me [phone], it is about 11:30 on Wednesday. Thank you.

## 2023-11-09 DIAGNOSIS — I70.0 AORTIC CALCIFICATION (HCC): Primary | ICD-10-CM

## 2023-11-09 NOTE — TELEPHONE ENCOUNTER
Patient called again this morning as she has some questions based off of what she read in her Xray results. She is wondering if she should look further into the calcified aortic valve. IS there a specialist or any other testing that she should have done? States that she is feeling slightly better on the Doxycycline but if her symptoms do not fully resolve should she try something else? Please review Xray and advise on the above.

## 2023-11-09 NOTE — TELEPHONE ENCOUNTER
Patient called back and would like to know if an EKG was/could be ordered along with the Echo. She would like to have both done at the same time. Please advise.

## 2023-11-09 NOTE — TELEPHONE ENCOUNTER
Call pt and she is advised gave message, pt is still have the symptoms since Monday night and still taken the antibiotics and say, if she dont get better want to call in an different antibiotics she will call on Monday

## 2023-11-10 ENCOUNTER — HOSPITAL ENCOUNTER (OUTPATIENT)
Dept: NON INVASIVE DIAGNOSTICS | Facility: HOSPITAL | Age: 63
Discharge: HOME | End: 2023-11-10
Payer: COMMERCIAL

## 2023-11-10 VITALS
SYSTOLIC BLOOD PRESSURE: 120 MMHG | BODY MASS INDEX: 18.26 KG/M2 | DIASTOLIC BLOOD PRESSURE: 62 MMHG | HEIGHT: 60 IN | HEART RATE: 66 BPM | WEIGHT: 93 LBS

## 2023-11-10 DIAGNOSIS — I70.0 AORTIC CALCIFICATION (HCC): ICD-10-CM

## 2023-11-10 LAB
AORTIC ROOT: 3.2 CM
AORTIC VALVE MEAN VELOCITY: 8.5 M/S
AV LVOT MEAN GRADIENT: 3 MMHG
AV LVOT PEAK GRADIENT: 5 MMHG
AV MEAN GRADIENT: 3 MMHG
AV PEAK GRADIENT: 5 MMHG
AV VELOCITY RATIO: 0.95
DOP CALC AO PEAK VEL: 1.16 M/S
DOP CALC AO VTI: 24.72 CM
DOP CALC LVOT PEAK VEL VTI: 22.41 CM
DOP CALC LVOT PEAK VEL: 1.1 M/S
DOP CALC MV VTI: 22.98 CM
E WAVE DECELERATION TIME: 212 MS
E/A RATIO: 1.15
FRACTIONAL SHORTENING: 39 (ref 28–44)
INTERVENTRICULAR SEPTUM IN DIASTOLE (PARASTERNAL SHORT AXIS VIEW): 0.7 CM
INTERVENTRICULAR SEPTUM: 0.7 CM (ref 0.6–1.1)
LAAS-AP2: 8.8 CM2
LAAS-AP4: 8.4 CM2
LEFT ATRIUM SIZE: 2.8 CM
LEFT ATRIUM VOLUME (MOD BIPLANE): 18 ML
LEFT ATRIUM VOLUME INDEX (MOD BIPLANE): 13.3 ML/M2
LEFT INTERNAL DIMENSION IN SYSTOLE: 2.8 CM (ref 2.1–4)
LEFT VENTRICULAR INTERNAL DIMENSION IN DIASTOLE: 4.6 CM (ref 3.5–6)
LEFT VENTRICULAR POSTERIOR WALL IN END DIASTOLE: 0.7 CM
LEFT VENTRICULAR STROKE VOLUME: 67 ML
LVSV (TEICH): 67 ML
MV E'TISSUE VEL-LAT: 11 CM/S
MV E'TISSUE VEL-SEP: 8 CM/S
MV MEAN GRADIENT: 1 MMHG
MV PEAK A VEL: 0.67 M/S
MV PEAK E VEL: 77 CM/S
MV PEAK GRADIENT: 3 MMHG
MV STENOSIS PRESSURE HALF TIME: 62 MS
MV VALVE AREA P 1/2 METHOD: 3.55
RIGHT ATRIAL 2D VOLUME: 19 ML
RIGHT ATRIUM AREA SYSTOLE A4C: 9 CM2
SL CV LEFT ATRIUM LENGTH A2C: 3.5 CM
SL CV PED ECHO LEFT VENTRICLE DIASTOLIC VOLUME (MOD BIPLANE) 2D: 97 ML
SL CV PED ECHO LEFT VENTRICLE SYSTOLIC VOLUME (MOD BIPLANE) 2D: 31 ML
TR MAX PG: 22 MMHG
TR PEAK VELOCITY: 2.4 M/S
TRICUSPID ANNULAR PLANE SYSTOLIC EXCURSION: 2.1 CM
TRICUSPID VALVE PEAK REGURGITATION VELOCITY: 2.37 M/S

## 2023-11-10 PROCEDURE — 93306 TTE W/DOPPLER COMPLETE: CPT

## 2023-11-13 DIAGNOSIS — R06.02 SHORTNESS OF BREATH: ICD-10-CM

## 2023-11-13 DIAGNOSIS — E78.49 OTHER HYPERLIPIDEMIA: Primary | ICD-10-CM

## 2023-11-13 LAB
AORTIC ROOT: 3.2 CM
AORTIC VALVE MEAN VELOCITY: 8.5 M/S
AV LVOT MEAN GRADIENT: 3 MMHG
AV LVOT PEAK GRADIENT: 5 MMHG
AV MEAN GRADIENT: 3 MMHG
AV PEAK GRADIENT: 5 MMHG
AV VELOCITY RATIO: 0.95
DOP CALC AO PEAK VEL: 1.16 M/S
DOP CALC AO VTI: 24.72 CM
DOP CALC LVOT PEAK VEL VTI: 22.41 CM
DOP CALC LVOT PEAK VEL: 1.1 M/S
DOP CALC MV VTI: 22.98 CM
E WAVE DECELERATION TIME: 212 MS
E/A RATIO: 1.15
FRACTIONAL SHORTENING: 39 (ref 28–44)
INTERVENTRICULAR SEPTUM IN DIASTOLE (PARASTERNAL SHORT AXIS VIEW): 0.7 CM
INTERVENTRICULAR SEPTUM: 0.7 CM (ref 0.6–1.1)
LAAS-AP2: 8.8 CM2
LAAS-AP4: 8.4 CM2
LEFT ATRIUM SIZE: 2.8 CM
LEFT ATRIUM VOLUME (MOD BIPLANE): 18 ML
LEFT ATRIUM VOLUME INDEX (MOD BIPLANE): 13.3 ML/M2
LEFT INTERNAL DIMENSION IN SYSTOLE: 2.8 CM (ref 2.1–4)
LEFT VENTRICULAR INTERNAL DIMENSION IN DIASTOLE: 4.6 CM (ref 3.5–6)
LEFT VENTRICULAR POSTERIOR WALL IN END DIASTOLE: 0.7 CM
LEFT VENTRICULAR STROKE VOLUME: 67 ML
LVSV (TEICH): 67 ML
MV E'TISSUE VEL-LAT: 11 CM/S
MV E'TISSUE VEL-SEP: 8 CM/S
MV MEAN GRADIENT: 1 MMHG
MV PEAK A VEL: 0.67 M/S
MV PEAK E VEL: 77 CM/S
MV PEAK GRADIENT: 3 MMHG
MV STENOSIS PRESSURE HALF TIME: 62 MS
MV VALVE AREA P 1/2 METHOD: 3.55
RIGHT ATRIAL 2D VOLUME: 19 ML
RIGHT ATRIUM AREA SYSTOLE A4C: 9 CM2
SL CV LEFT ATRIUM LENGTH A2C: 3.5 CM
SL CV LV EF: 55
SL CV PED ECHO LEFT VENTRICLE DIASTOLIC VOLUME (MOD BIPLANE) 2D: 97 ML
SL CV PED ECHO LEFT VENTRICLE SYSTOLIC VOLUME (MOD BIPLANE) 2D: 31 ML
TR MAX PG: 22 MMHG
TR PEAK VELOCITY: 2.4 M/S
TRICUSPID ANNULAR PLANE SYSTOLIC EXCURSION: 2.1 CM
TRICUSPID VALVE PEAK REGURGITATION VELOCITY: 2.37 M/S

## 2023-11-13 PROCEDURE — 93306 TTE W/DOPPLER COMPLETE: CPT | Performed by: INTERNAL MEDICINE

## 2023-12-07 ENCOUNTER — TELEPHONE (OUTPATIENT)
Dept: FAMILY MEDICINE CLINIC | Facility: CLINIC | Age: 63
End: 2023-12-07

## 2023-12-07 ENCOUNTER — ANNUAL EXAM (OUTPATIENT)
Dept: GYNECOLOGY | Facility: CLINIC | Age: 63
End: 2023-12-07
Payer: COMMERCIAL

## 2023-12-07 VITALS
HEIGHT: 60 IN | BODY MASS INDEX: 18.26 KG/M2 | WEIGHT: 93 LBS | SYSTOLIC BLOOD PRESSURE: 118 MMHG | DIASTOLIC BLOOD PRESSURE: 72 MMHG

## 2023-12-07 DIAGNOSIS — Z87.39 HISTORY OF OSTEOPOROSIS: ICD-10-CM

## 2023-12-07 DIAGNOSIS — Z86.010 HISTORY OF COLONOSCOPY WITH POLYPECTOMY: ICD-10-CM

## 2023-12-07 DIAGNOSIS — Z12.31 ENCOUNTER FOR SCREENING MAMMOGRAM FOR BREAST CANCER: ICD-10-CM

## 2023-12-07 DIAGNOSIS — Z98.890 HISTORY OF COLONOSCOPY WITH POLYPECTOMY: ICD-10-CM

## 2023-12-07 DIAGNOSIS — Z01.419 WOMEN'S ANNUAL ROUTINE GYNECOLOGICAL EXAMINATION: ICD-10-CM

## 2023-12-07 PROCEDURE — 99396 PREV VISIT EST AGE 40-64: CPT | Performed by: OBSTETRICS & GYNECOLOGY

## 2023-12-07 RX ORDER — MELATONIN
1000 DAILY
Qty: 90 TABLET | Refills: 3 | Status: SHIPPED | OUTPATIENT
Start: 2023-12-07 | End: 2024-12-01

## 2023-12-07 NOTE — PROGRESS NOTES
Assessment   Annual well woman exam  History of osteoporosis  History of osteoarthritis of both hands  History of asthma  Family history of breast cancer, cousin  Difficulty gaining weight        Plan   Normal Pap in  next Pap due in   Screening mammogram ordered  Reviewed diet and exercise   All questions answered. Breast self exam technique reviewed and patient encouraged to perform self-exam monthly. Discussed healthy lifestyle modifications. Subjective here for annual exam     Juan De La Paz is a 58 y.o. female who presents for annual exam.  She no longer has menstrual cycles. She has been in menopause for 10 or more years. She is very thin has been attempting to gain some weight she finds this to be very difficult. She still works full-time. History of porosis not currently taking calcium or vitamin D. Renewed prescription for D3. The patient reports that there is not domestic violence in her life. Current contraception: post menopausal status  History of abnormal Pap smear: no  Family history of uterine or ovarian cancer: no  Regular self breast exam: yes  History of abnormal mammogram: no  Family history of breast cancer: no  History of abnormal lipids: no  Menstrual History:  OB History          2    Para   2    Term   2            AB        Living   2         SAB        IAB        Ectopic        Multiple        Live Births                    Menarche age: 14  No LMP recorded. Patient is postmenopausal.     Review of Systems  Pertinent items are noted in HPI.       Objective no acute distress     /72   Ht 5' (1.524 m)   Wt 42.2 kg (93 lb)   BMI 18.16 kg/m²     General:   alert and oriented, in no acute distress, alert, appears stated age, and cooperative   Heart: regular rate and rhythm, S1, S2 normal, no murmur, click, rub or gallop   Lungs: clear to auscultation bilaterally   Abdomen: soft, non-tender, without masses or organomegaly   Vulva: normal, Bartholin's, Urethra, [place]'s normal, female escutcheon   Vagina: normal mucosa, normal discharge, no palpable nodules   Cervix: anteverted, multiparous appearance, no cervical motion tenderness, and no lesions   Uterus: normal size, anteverted, mobile, non-tender, normal shape and consistency   Adnexa: normal adnexa and no mass, fullness, tenderness   Bilateral breast exam in the sitting and supine position with chaperone present, no visible or palpable breast lesions identified. No breast masses noted. No supraclavicular or axillary lymphadenopathy noted. No nipple discharge. Reviewed self-breast exam techniques.    Rectal exam,  deferred

## 2023-12-07 NOTE — TELEPHONE ENCOUNTER
Pt is calling because she received a bill from [facility] for $154.63 for services on 11/13. Pt said that she was in on 10/16 and 11/6, but on 11/13. The only thing listed from the 11/13 is an order for an ECG. Pt said she had already called billing and they told her to the call the office.

## 2023-12-11 ENCOUNTER — TELEPHONE (OUTPATIENT)
Dept: FAMILY MEDICINE CLINIC | Facility: CLINIC | Age: 63
End: 2023-12-11

## 2023-12-11 NOTE — TELEPHONE ENCOUNTER
Patient brought in EOB's for three dates of service:  [facility]mom for patient regarding bills. 11/6/23 -  [facility] for $147.00  We submitted to insurance one time, however, insurance processed times two. She is only responsible for one bill. Noted on pink sticky note on EOB returned to patient. 11/13/23 -  [facility] Prof Fees - This is for Echo done on 11/10. Results were read on 11/13 so must be billed for that date. Pt is responsible for this. Advised patient to wait until she received a final bill from [facility] prior to paying on any of these explanation of benefits.

## 2024-01-05 PROBLEM — J01.01 ACUTE RECURRENT MAXILLARY SINUSITIS: Status: RESOLVED | Noted: 2023-04-16 | Resolved: 2024-01-05

## 2024-01-18 ENCOUNTER — TELEPHONE (OUTPATIENT)
Dept: FAMILY MEDICINE CLINIC | Facility: CLINIC | Age: 64
End: 2024-01-18

## 2024-01-18 ENCOUNTER — OFFICE VISIT (OUTPATIENT)
Dept: FAMILY MEDICINE CLINIC | Facility: CLINIC | Age: 64
End: 2024-01-18
Payer: COMMERCIAL

## 2024-01-18 VITALS
HEIGHT: 60 IN | WEIGHT: 96 LBS | DIASTOLIC BLOOD PRESSURE: 80 MMHG | OXYGEN SATURATION: 97 % | SYSTOLIC BLOOD PRESSURE: 124 MMHG | TEMPERATURE: 95 F | HEART RATE: 92 BPM | BODY MASS INDEX: 18.85 KG/M2

## 2024-01-18 DIAGNOSIS — J01.00 ACUTE NON-RECURRENT MAXILLARY SINUSITIS: Primary | ICD-10-CM

## 2024-01-18 PROCEDURE — 99213 OFFICE O/P EST LOW 20 MIN: CPT | Performed by: NURSE PRACTITIONER

## 2024-01-18 RX ORDER — AMOXICILLIN 500 MG/1
500 TABLET, FILM COATED ORAL 2 TIMES DAILY
Qty: 20 TABLET | Refills: 0 | Status: SHIPPED | OUTPATIENT
Start: 2024-01-18 | End: 2024-01-28

## 2024-01-18 NOTE — TELEPHONE ENCOUNTER
Patient seen today and received a medication refill for her antibiotic but not cough medication, patient curious if a cough medication is going to be prescribed too or if she should get OTC?

## 2024-01-18 NOTE — PATIENT INSTRUCTIONS
Recommend mucinex dm or robitussin dm  Amoxicillin twice daily for 10 days, take with food  Increase fluids  Steamy showers  Continue rhinocort  Sleep with head of bed elevated

## 2024-01-18 NOTE — PROGRESS NOTES
Name: Ursula Gold      : 1960      MRN: [id number]  Encounter Provider: CASEY Turner  Encounter Date: 2024   Encounter department: [facility]    Assessment & Plan     1. Acute non-recurrent maxillary sinusitis  Assessment & Plan:  Recommend mucinex dm or robitussin dm  Amoxicillin twice daily for 10 days, take with food  Increase fluids  Steamy showers  Continue rhinocort  Sleep with head of bed elevated    Orders:  -     amoxicillin (AMOXIL) 500 MG tablet; Take 1 tablet (500 mg total) by mouth 2 (two) times a day for 10 days         Subjective       night into Monday started to feel sick, then had nausea and diarrhea resolved, but has had decreased appetite. Has progressed into right ear pain, cough, mostly dry. Decreased energy. No fevers. Was with her grand kids all weekend. Has been taking ASA and rhinocort nose spray doesn't make much improvement. COVID test negative yesterday.        Review of Systems   Constitutional:  Negative for chills, fatigue and fever.   HENT:  Positive for congestion, ear pain, postnasal drip, rhinorrhea and sore throat. Negative for trouble swallowing.    Respiratory:  Positive for cough.    Cardiovascular: Negative.    Gastrointestinal:  Positive for diarrhea (resolved) and nausea (resolved).       Current Outpatient Medications on File Prior to Visit   Medication Sig   • aspirin 325 mg tablet Take 325 mg by mouth daily   • cholecalciferol (VITAMIN D3) 1,000 units tablet Take 1 tablet (1,000 Units total) by mouth daily   • Multiple Vitamin (MULTIVITAMIN) tablet Take 1 tablet by mouth daily   • albuterol (Proventil HFA) 90 mcg/act inhaler Inhale 2 puffs every 6 (six) hours as needed for wheezing (Patient not taking: Reported on 2023)       Objective     /80   Pulse 92   Temp (!) 95 °F (35 °C) (Tympanic)   Ht 5' (1.524 m)   Wt 43.5 kg (96 lb)   SpO2 97%   BMI 18.75 kg/m²     Physical Exam  Constitutional:        General: She is not in acute distress.     Appearance: Normal appearance. She is normal weight. She is not ill-appearing.   HENT:      Right Ear: Ear canal and external ear normal. Tympanic membrane is erythematous (mildly erythematous). Tympanic membrane is not bulging.      Left Ear: Ear canal and external ear normal. Tympanic membrane is not erythematous or bulging.      Nose: Congestion and rhinorrhea present.      Mouth/Throat:      Mouth: Mucous membranes are moist.      Pharynx: Posterior oropharyngeal erythema (PND) present.   Eyes:      General: No scleral icterus.        Right eye: No discharge.         Left eye: No discharge.      Extraocular Movements: Extraocular movements intact.      Conjunctiva/sclera: Conjunctivae normal.      Pupils: Pupils are equal, round, and reactive to light.   Cardiovascular:      Rate and Rhythm: Normal rate and regular rhythm.      Heart sounds: Normal heart sounds. No murmur heard.  Pulmonary:      Effort: Pulmonary effort is normal.      Breath sounds: Normal breath sounds. No wheezing or rhonchi.   Abdominal:      Palpations: Abdomen is soft.      Tenderness: There is no abdominal tenderness.   Musculoskeletal:      Cervical back: Neck supple.   Lymphadenopathy:      Cervical: No cervical adenopathy.   Skin:     General: Skin is warm.      Findings: No rash.   Neurological:      Mental Status: She is alert and oriented to person, place, and time.       CASEY Turner

## 2024-01-22 ENCOUNTER — TELEPHONE (OUTPATIENT)
Dept: FAMILY MEDICINE CLINIC | Facility: CLINIC | Age: 64
End: 2024-01-22

## 2024-01-22 NOTE — TELEPHONE ENCOUNTER
She should continue amoxicillin, increase fluids, take mucinex or robitussin DM and give it some more time.

## 2024-01-22 NOTE — TELEPHONE ENCOUNTER
Pt is calling because she was in last Thursday and was prescribed amoxicillin. Pt said that over the weekend she was running a temperature but today there is no temperature. Pt said that when she coughs green mucus is coming up. Pt said she tested again for Covid this morning and it came back negative. Pt said that she barely has any energy to do anything but has been trying to force herself to do things. Pt would like to know if she should stick with the amoxicillin, have a chest xray, or a different medication.    Please advise

## 2024-01-25 ENCOUNTER — OFFICE VISIT (OUTPATIENT)
Dept: FAMILY MEDICINE CLINIC | Facility: CLINIC | Age: 64
End: 2024-01-25
Payer: COMMERCIAL

## 2024-01-25 ENCOUNTER — APPOINTMENT (OUTPATIENT)
Dept: RADIOLOGY | Facility: CLINIC | Age: 64
End: 2024-01-25
Payer: COMMERCIAL

## 2024-01-25 ENCOUNTER — HOSPITAL ENCOUNTER (OUTPATIENT)
Dept: RADIOLOGY | Facility: HOSPITAL | Age: 64
End: 2024-01-25
Payer: COMMERCIAL

## 2024-01-25 ENCOUNTER — TELEPHONE (OUTPATIENT)
Dept: FAMILY MEDICINE CLINIC | Facility: CLINIC | Age: 64
End: 2024-01-25

## 2024-01-25 VITALS
HEIGHT: 60 IN | WEIGHT: 100 LBS | DIASTOLIC BLOOD PRESSURE: 65 MMHG | BODY MASS INDEX: 19.63 KG/M2 | OXYGEN SATURATION: 93 % | HEART RATE: 70 BPM | TEMPERATURE: 96.4 F | SYSTOLIC BLOOD PRESSURE: 125 MMHG

## 2024-01-25 DIAGNOSIS — J45.20 MILD INTERMITTENT ASTHMATIC BRONCHITIS WITHOUT COMPLICATION: ICD-10-CM

## 2024-01-25 DIAGNOSIS — J20.9 ACUTE BRONCHITIS, UNSPECIFIED ORGANISM: ICD-10-CM

## 2024-01-25 DIAGNOSIS — J45.31 MILD PERSISTENT ASTHMATIC BRONCHITIS WITH ACUTE EXACERBATION: Primary | ICD-10-CM

## 2024-01-25 PROBLEM — J45.909 ASTHMATIC BRONCHITIS: Status: ACTIVE | Noted: 2024-01-25

## 2024-01-25 PROBLEM — M84.374A STRESS FRACTURE OF RIGHT CALCANEUS: Status: RESOLVED | Noted: 2018-10-08 | Resolved: 2024-01-25

## 2024-01-25 PROBLEM — M77.41 METATARSALGIA OF RIGHT FOOT: Status: RESOLVED | Noted: 2017-08-07 | Resolved: 2024-01-25

## 2024-01-25 PROBLEM — Z71.85 IMMUNIZATION COUNSELING: Status: RESOLVED | Noted: 2021-11-11 | Resolved: 2024-01-25

## 2024-01-25 PROBLEM — R10.30 LOWER ABDOMINAL PAIN: Status: RESOLVED | Noted: 2021-08-11 | Resolved: 2024-01-25

## 2024-01-25 PROCEDURE — 71046 X-RAY EXAM CHEST 2 VIEWS: CPT

## 2024-01-25 PROCEDURE — 99213 OFFICE O/P EST LOW 20 MIN: CPT | Performed by: FAMILY MEDICINE

## 2024-01-25 RX ORDER — DOXYCYCLINE HYCLATE 100 MG/1
100 CAPSULE ORAL EVERY 12 HOURS SCHEDULED
Qty: 20 CAPSULE | Refills: 0 | Status: SHIPPED | OUTPATIENT
Start: 2024-01-25 | End: 2024-02-04

## 2024-01-25 RX ORDER — ALBUTEROL SULFATE 90 UG/1
2 AEROSOL, METERED RESPIRATORY (INHALATION) EVERY 6 HOURS PRN
Qty: 8.5 G | Refills: 0 | Status: SHIPPED | OUTPATIENT
Start: 2024-01-25

## 2024-01-25 RX ORDER — PREDNISONE 10 MG/1
TABLET ORAL
Qty: 20 TABLET | Refills: 0 | Status: SHIPPED | OUTPATIENT
Start: 2024-01-25

## 2024-01-25 NOTE — TELEPHONE ENCOUNTER
Patient's xray results came back negative.  She wanted to make sure that she should still follow the same instructions.  Doxy/Steroids?    Please advise.

## 2024-01-25 NOTE — PROGRESS NOTES
Assessment/Plan:      1. Mild persistent asthmatic bronchitis with acute exacerbation  Assessment & Plan:  Cxr r/o pneumonia. Change to doxycycline 1 tab twice a day. Prednisone taper.     Orders:  -     predniSONE 10 mg tablet; 4 tabs for 2 days, 3 tabs for 2 days, 2 tabs for 2 days and 1 tab for 2 days  -     XR chest pa & lateral; Future; Expected date: 01/25/2024  -     doxycycline hyclate (Vibramycin) 100 mg capsule; Take 1 capsule (100 mg total) by mouth every 12 (twelve) hours for 10 days    2. Acute bronchitis, unspecified organism  -     albuterol (Proventil HFA) 90 mcg/act inhaler; Inhale 2 puffs every 6 (six) hours as needed for wheezing          Subjective:  Chief Complaint   Patient presents with    Follow-up     Follow yesterday  no fever symptoms and want a chest x ray has a deep cough spitting up mucus three days left on antibiotics coughing a lot anytime of the day otc dm cough meds and mucusnex dm use it once yesterday         Patient ID: Ursula Gold is a 63 y.o. female.    Pt is here with her  today.   Pt complains of ongoing cough. Low grade temp intermittently.   3 days left of amoxicillin. - complains of a deep cough- productive intermittently, green and thick   Had head congestion some ear pressure, improved but not resolved.   Appetite ok. Energy level lower. Missed 2 weeks of work.   Started initially with diarrhea and nausea   Tested for covid 2times both negative.  Started last Monday.         Review of Systems   Constitutional:  Positive for fatigue and fever.   HENT:  Positive for congestion, ear pain, postnasal drip and sinus pressure.    Eyes: Negative.    Respiratory:  Positive for cough and wheezing.    Cardiovascular: Negative.    Gastrointestinal: Negative.    Endocrine: Negative.    Genitourinary: Negative.    Musculoskeletal: Negative.    Skin: Negative.    Allergic/Immunologic: Negative.    Neurological: Negative.    Psychiatric/Behavioral: Negative.            The following portions of the patient's history were reviewed and updated as appropriate: allergies, current medications, past family history, past medical history, past social history, past surgical history and problem list.    Objective:  Vitals:    01/25/24 0825 01/25/24 0901   BP: 120/50 125/65   Pulse: 70    Temp: (!) 96.4 °F (35.8 °C)    TempSrc: Tympanic    SpO2: 93%    Weight: 45.4 kg (100 lb)    Height: 5' (1.524 m)       Physical Exam  Vitals and nursing note reviewed.   Constitutional:       Appearance: She is well-developed.   HENT:      Head: Normocephalic and atraumatic.   Cardiovascular:      Rate and Rhythm: Normal rate and regular rhythm.      Heart sounds: Normal heart sounds.   Pulmonary:      Effort: Pulmonary effort is normal.      Breath sounds: Wheezing present.      Comments: Diffuse wheeze bilaterally  Abdominal:      General: Bowel sounds are normal.      Palpations: Abdomen is soft.   Skin:     General: Skin is warm and dry.   Neurological:      Mental Status: She is alert and oriented to person, place, and time.   Psychiatric:         Behavior: Behavior normal.         Thought Content: Thought content normal.         Judgment: Judgment normal.

## 2024-01-25 NOTE — PATIENT INSTRUCTIONS
Prednsione 10mg 4 tabs for 2 days, 3 tabs for 2 days, 2 tabs for 2 days, 1 tabs for 2 days.  Change to doxycycline 1 tab twice a day.   Chest xray   Albuterol 2 puffs every 4 hours as needed

## 2024-02-14 ENCOUNTER — TELEPHONE (OUTPATIENT)
Dept: FAMILY MEDICINE CLINIC | Facility: CLINIC | Age: 64
End: 2024-02-14

## 2024-02-14 DIAGNOSIS — R53.82 CHRONIC FATIGUE: ICD-10-CM

## 2024-02-14 DIAGNOSIS — D75.1 ERYTHROCYTOSIS: Primary | ICD-10-CM

## 2024-02-14 NOTE — TELEPHONE ENCOUNTER
Patient is calling because she is coming in tomorrow to get lab work done, but would like to know if the B12 and Ferritine could be added to that order for tomorrow.    Please advise

## 2024-02-15 ENCOUNTER — OFFICE VISIT (OUTPATIENT)
Dept: FAMILY MEDICINE CLINIC | Facility: CLINIC | Age: 64
End: 2024-02-15
Payer: MEDICARE

## 2024-02-15 ENCOUNTER — TELEPHONE (OUTPATIENT)
Dept: FAMILY MEDICINE CLINIC | Facility: CLINIC | Age: 64
End: 2024-02-15

## 2024-02-15 VITALS
WEIGHT: 91 LBS | BODY MASS INDEX: 17.87 KG/M2 | OXYGEN SATURATION: 97 % | TEMPERATURE: 96.1 F | SYSTOLIC BLOOD PRESSURE: 140 MMHG | DIASTOLIC BLOOD PRESSURE: 80 MMHG | HEART RATE: 82 BPM | HEIGHT: 60 IN

## 2024-02-15 DIAGNOSIS — I70.0 AORTIC CALCIFICATION (HCC): ICD-10-CM

## 2024-02-15 DIAGNOSIS — J45.31 MILD PERSISTENT ASTHMATIC BRONCHITIS WITH ACUTE EXACERBATION: ICD-10-CM

## 2024-02-15 DIAGNOSIS — R06.02 SHORTNESS OF BREATH: ICD-10-CM

## 2024-02-15 DIAGNOSIS — E78.2 MIXED HYPERLIPIDEMIA: ICD-10-CM

## 2024-02-15 DIAGNOSIS — R53.82 CHRONIC FATIGUE: ICD-10-CM

## 2024-02-15 DIAGNOSIS — M26.609 TMJ (TEMPOROMANDIBULAR JOINT DISORDER): ICD-10-CM

## 2024-02-15 DIAGNOSIS — J01.00 SUBACUTE MAXILLARY SINUSITIS: ICD-10-CM

## 2024-02-15 DIAGNOSIS — E46 PROTEIN-CALORIE MALNUTRITION, UNSPECIFIED SEVERITY (HCC): ICD-10-CM

## 2024-02-15 DIAGNOSIS — Z00.00 WELCOME TO MEDICARE PREVENTIVE VISIT: Primary | ICD-10-CM

## 2024-02-15 DIAGNOSIS — E78.49 OTHER HYPERLIPIDEMIA: ICD-10-CM

## 2024-02-15 DIAGNOSIS — D75.1 ERYTHROCYTOSIS: ICD-10-CM

## 2024-02-15 DIAGNOSIS — M81.0 OSTEOPOROSIS WITHOUT CURRENT PATHOLOGICAL FRACTURE, UNSPECIFIED OSTEOPOROSIS TYPE: ICD-10-CM

## 2024-02-15 DIAGNOSIS — J30.89 NON-SEASONAL ALLERGIC RHINITIS, UNSPECIFIED TRIGGER: Primary | ICD-10-CM

## 2024-02-15 PROCEDURE — 99213 OFFICE O/P EST LOW 20 MIN: CPT | Performed by: FAMILY MEDICINE

## 2024-02-15 PROCEDURE — 36415 COLL VENOUS BLD VENIPUNCTURE: CPT | Performed by: FAMILY MEDICINE

## 2024-02-15 PROCEDURE — G0403 EKG FOR INITIAL PREVENT EXAM: HCPCS | Performed by: FAMILY MEDICINE

## 2024-02-15 RX ORDER — PREDNISONE 10 MG/1
TABLET ORAL
Qty: 20 TABLET | Refills: 0 | Status: SHIPPED | OUTPATIENT
Start: 2024-02-15

## 2024-02-15 RX ORDER — MONTELUKAST SODIUM 10 MG/1
10 TABLET ORAL
Qty: 30 TABLET | Refills: 5 | Status: SHIPPED | OUTPATIENT
Start: 2024-02-15 | End: 2024-03-08

## 2024-02-15 RX ORDER — DOXYCYCLINE HYCLATE 100 MG/1
100 CAPSULE ORAL EVERY 12 HOURS SCHEDULED
Qty: 28 CAPSULE | Refills: 0 | Status: SHIPPED | OUTPATIENT
Start: 2024-02-15 | End: 2024-02-29

## 2024-02-15 NOTE — PATIENT INSTRUCTIONS
Doxycycline 1 tab twice a day   Prednisone 10mg 4 tabs for 2 days, 3 tabs for 2 days, 2 tabs for 2 days, 1 tabs for 2 days

## 2024-02-15 NOTE — TELEPHONE ENCOUNTER
Patient states that singulair was discussed at her visit today but it was not called into the pharmacy.  Are you able to send an rx for this to [facility]?    Thank you!

## 2024-02-15 NOTE — PROGRESS NOTES
Assessment and Plan:     Problem List Items Addressed This Visit          Respiratory    Subacute maxillary sinusitis    Relevant Medications    doxycycline hyclate (Vibramycin) 100 mg capsule    Other Relevant Orders    CBC and differential (Completed)    Comprehensive metabolic panel (Completed)    Ferritin (Completed)    Lipid Panel with Direct LDL reflex (Completed)    TSH, 3rd generation with Free T4 reflex (Completed)    Vitamin B12 (Completed)    Asthmatic bronchitis    Relevant Medications    predniSONE 10 mg tablet    Other Relevant Orders    CBC and differential (Completed)    Comprehensive metabolic panel (Completed)    Ferritin (Completed)    Lipid Panel with Direct LDL reflex (Completed)    TSH, 3rd generation with Free T4 reflex (Completed)    Vitamin B12 (Completed)       Musculoskeletal and Integument    Osteoporosis    Relevant Orders    CBC and differential (Completed)    Comprehensive metabolic panel (Completed)    Ferritin (Completed)    Lipid Panel with Direct LDL reflex (Completed)    TSH, 3rd generation with Free T4 reflex (Completed)    Vitamin B12 (Completed)    TMJ (temporomandibular joint disorder)    Relevant Orders    CBC and differential (Completed)    Comprehensive metabolic panel (Completed)    Ferritin (Completed)    Lipid Panel with Direct LDL reflex (Completed)    TSH, 3rd generation with Free T4 reflex (Completed)    Vitamin B12 (Completed)       Other    Hyperlipidemia    Relevant Orders    CBC and differential (Completed)    Comprehensive metabolic panel (Completed)    Ferritin (Completed)    Lipid Panel with Direct LDL reflex (Completed)    TSH, 3rd generation with Free T4 reflex (Completed)    Vitamin B12 (Completed)    CBC and differential (Completed)    Comprehensive metabolic panel (Completed)    Ferritin (Completed)    Lipid Panel with Direct LDL reflex (Completed)    TSH, 3rd generation with Free T4 reflex (Completed)    Vitamin B12 (Completed)    Shortness of breath     Relevant Orders    CBC and differential (Completed)    Comprehensive metabolic panel (Completed)    Ferritin (Completed)    Lipid Panel with Direct LDL reflex (Completed)    TSH, 3rd generation with Free T4 reflex (Completed)    Vitamin B12 (Completed)    Welcome to Medicare preventive visit - Primary    Relevant Orders    CBC and differential (Completed)    Comprehensive metabolic panel (Completed)    Ferritin (Completed)    Lipid Panel with Direct LDL reflex (Completed)    TSH, 3rd generation with Free T4 reflex (Completed)    Vitamin B12 (Completed)    POCT ECG    Chronic fatigue    Relevant Orders    CBC and differential (Completed)    Comprehensive metabolic panel (Completed)    Ferritin (Completed)    Lipid Panel with Direct LDL reflex (Completed)    TSH, 3rd generation with Free T4 reflex (Completed)    Vitamin B12 (Completed)    Erythrocytosis    Relevant Orders    CBC and differential (Completed)    Comprehensive metabolic panel (Completed)    Ferritin (Completed)    Lipid Panel with Direct LDL reflex (Completed)    TSH, 3rd generation with Free T4 reflex (Completed)    Vitamin B12 (Completed)     Other Visit Diagnoses       Aortic calcification (HCC)        Relevant Orders    CBC and differential (Completed)    Comprehensive metabolic panel (Completed)    Ferritin (Completed)    Lipid Panel with Direct LDL reflex (Completed)    TSH, 3rd generation with Free T4 reflex (Completed)    Vitamin B12 (Completed)    Protein-calorie malnutrition, unspecified severity (HCC)        Relevant Orders    Ferritin (Completed)    Vitamin B12 (Completed)            Depression Screening and Follow-up Plan: Patient was screened for depression during today's encounter. They screened negative with a PHQ-2 score of 0.    Tobacco Cessation Counseling: Tobacco cessation counseling was provided. The patient is sincerely urged to quit consumption of tobacco. She is not ready to quit tobacco. Medication options and side effects of  medication not discussed. Patient refused medication.       Preventive health issues were discussed with patient, and age appropriate screening tests were ordered as noted in patient's After Visit Summary.  Personalized health advice and appropriate referrals for health education or preventive services given if needed, as noted in patient's After Visit Summary.     History of Present Illness:     Patient presents for a Medicare Wellness Visit    Pt is seen for medicare wellness.   Started with sinus pressure, green nasal drainage. Post nasal drip, draining into the chest.   Start using rhinocort, used inhaler -   Humidifier.        Patient Care Team:  Minnie Aburto DO as PCP - General (Family Medicine)  Capri Lyles DO as PCP - OBGYN (Obstetrics and Gynecology)  MD Capri Barker DO     Review of Systems:     Review of Systems   Constitutional:  Positive for fatigue. Negative for fever.   HENT:  Positive for congestion, postnasal drip and sinus pressure. Negative for sore throat.    Eyes: Negative.    Respiratory:  Positive for cough.    Cardiovascular: Negative.    Gastrointestinal: Negative.    Genitourinary: Negative.    Musculoskeletal: Negative.    Skin: Negative.    Allergic/Immunologic: Positive for environmental allergies.   Neurological:  Positive for headaches.   Hematological: Negative.    Psychiatric/Behavioral: Negative.          Problem List:     Patient Active Problem List   Diagnosis    Hyperlipidemia    Osteoarthritis of both hands    Osteoporosis    History of colon polyps    TMJ (temporomandibular joint disorder)    Kidney stone    Shortness of breath    Subacute maxillary sinusitis    Asthmatic bronchitis    Non-seasonal allergic rhinitis    Welcome to Medicare preventive visit    Chronic fatigue    Erythrocytosis      Past Medical and Surgical History:     Past Medical History:   Diagnosis Date    Colitis     COVID-19 5/17/2022    Nephrolithiasis     Sessile colonic polyp     last  assessed 9/1/16    TMJ (temporomandibular joint disorder)      Past Surgical History:   Procedure Laterality Date    APPENDECTOMY      COLONOSCOPY      fiberoptic/screening - last assessed 9/2/14    COLPOSCOPY      cervix w/bx w/endocervical curettage     MANDIBLE FRACTURE SURGERY      jaw surgery - last assessed 9/2/14 - this was for the TMJ (Dr. Merino)    TONSILLECTOMY        Family History:     Family History   Problem Relation Age of Onset    Stroke Father         CVA    No Known Problems Mother     No Known Problems Sister     No Known Problems Daughter     No Known Problems Maternal Grandmother     No Known Problems Maternal Grandfather     No Known Problems Paternal Grandmother     No Known Problems Paternal Grandfather     No Known Problems Daughter     No Known Problems Paternal Aunt     No Known Problems Paternal Aunt     Breast cancer Cousin         50's    Cirrhosis Neg Hx     Colon cancer Neg Hx     Colon polyps Neg Hx       Social History:     Social History     Socioeconomic History    Marital status: /Civil Union     Spouse name: None    Number of children: 2    Years of education: None    Highest education level: None   Occupational History    None   Tobacco Use    Smoking status: Every Day     Current packs/day: 0.50     Types: Cigarettes    Smokeless tobacco: Never   Vaping Use    Vaping status: Never Used   Substance and Sexual Activity    Alcohol use: Yes     Comment: social     Drug use: No    Sexual activity: Yes     Partners: Male     Birth control/protection: None   Other Topics Concern    None   Social History Narrative    Does not use caffeine        Uses seat belt    Spiritism    Denied hx of caffeine use     Social Determinants of Health     Financial Resource Strain: Low Risk  (2/15/2024)    Overall Financial Resource Strain (CARDIA)     Difficulty of Paying Living Expenses: Not hard at all   Food Insecurity: Not on file   Transportation Needs: No Transportation Needs  (2/15/2024)    PRAPARE - Transportation     Lack of Transportation (Medical): No     Lack of Transportation (Non-Medical): No   Physical Activity: Not on file   Stress: Not on file   Social Connections: Not on file   Intimate Partner Violence: Not on file   Housing Stability: Not on file      Medications and Allergies:     Current Outpatient Medications   Medication Sig Dispense Refill    albuterol (Proventil HFA) 90 mcg/act inhaler Inhale 2 puffs every 6 (six) hours as needed for wheezing 8.5 g 0    aspirin 325 mg tablet Take 325 mg by mouth daily      cholecalciferol (VITAMIN D3) 1,000 units tablet Take 1 tablet (1,000 Units total) by mouth daily 90 tablet 3    doxycycline hyclate (Vibramycin) 100 mg capsule Take 1 capsule (100 mg total) by mouth every 12 (twelve) hours for 14 days 28 capsule 0    Multiple Vitamin (MULTIVITAMIN) tablet Take 1 tablet by mouth daily      predniSONE 10 mg tablet 4 tabs for 2 days, 3 tabs for 2 days, 2 tabs for 2 days and 1 tab for 2 days 20 tablet 0    montelukast (SINGULAIR) 10 mg tablet Take 1 tablet (10 mg total) by mouth daily at bedtime 30 tablet 5     No current facility-administered medications for this visit.     Allergies   Allergen Reactions    Cefaclor Hives     Category: Allergy;     Cephalexin Nausea Only     Reaction Date: 28Apr2011; Category: Adverse Reaction;     Eggs Or Egg-Derived Products - Food Allergy Other (See Comments)     congestion    Avelox [Moxifloxacin] Palpitations     Reaction Date: 28Apr2011; Category: Adverse Reaction;       Immunizations:     Immunization History   Administered Date(s) Administered    Td (adult), adsorbed 08/07/2006    Tuberculin Skin Test-PPD Intradermal 10/23/2013, 11/29/2022, 12/13/2022      Health Maintenance:         Topic Date Due    HIV Screening  Never done    Breast Cancer Screening: Mammogram  02/16/2024    Cervical Cancer Screening  11/18/2024    DXA SCAN  01/07/2026    Colorectal Cancer Screening  09/27/2026    Hepatitis  C Screening  Completed         Topic Date Due    Pneumococcal Vaccine: 65+ Years (1 of 2 - PCV) Never done    COVID-19 Vaccine (1 - 2023-24 season) Never done      Medicare Screening Tests and Risk Assessments:     Ursula is here for her Welcome to Medicare visit. Last Medicare Wellness visit information reviewed, patient interviewed and updates made to the record today.      Health Risk Assessment:   Patient rates overall health as very good. Patient feels that their physical health rating is same. Patient is very satisfied with their life. Eyesight was rated as same. Hearing was rated as same. Patient feels that their emotional and mental health rating is same. Patients states they are never, rarely angry. Patient states they are sometimes unusually tired/fatigued. Pain experienced in the last 7 days has been none. Patient states that she has experienced no weight loss or gain in last 6 months.     Depression Screening:   PHQ-2 Score: 0      Fall Risk Screening:   In the past year, patient has experienced: no history of falling in past year      Urinary Incontinence Screening:   Patient has not leaked urine accidently in the last six months.     Home Safety:  Patient does not have trouble with stairs inside or outside of their home. Patient has working smoke alarms and has working carbon monoxide detector. Home safety hazards include: none.     Nutrition:   Current diet is Regular.     Medications:   Patient is currently taking over-the-counter supplements. OTC medications include: see medication list. Patient is able to manage medications.     Activities of Daily Living (ADLs)/Instrumental Activities of Daily Living (IADLs):   Walk and transfer into and out of bed and chair?: Yes  Dress and groom yourself?: Yes    Bathe or shower yourself?: Yes    Feed yourself? Yes  Do your laundry/housekeeping?: Yes  Manage your money, pay your bills and track your expenses?: Yes  Make your own meals?: Yes    Do your own  shopping?: Yes    Previous Hospitalizations:   Any hospitalizations or ED visits within the last 12 months?: No      Advance Care Planning:   Living will: Yes    Advanced directive: Yes      PREVENTIVE SCREENINGS      Cardiovascular Screening:    General: Screening Not Indicated and History Lipid Disorder      Colorectal Cancer Screening:     General: Screening Current      Breast Cancer Screening:     General: Screening Current      Cervical Cancer Screening:    General: Screening Not Indicated      Osteoporosis Screening:    General: Screening Not Indicated and History Osteoporosis      Lung Cancer Screening:     General: Screening Not Indicated      Hepatitis C Screening:    General: Screening Current    Screening, Brief Intervention, and Referral to Treatment (SBIRT)    Screening  Typical number of drinks in a day: 0  Typical number of drinks in a week: 0  Interpretation: Low risk drinking behavior.    Single Item Drug Screening:  How often have you used an illegal drug (including marijuana) or a prescription medication for non-medical reasons in the past year? never    Single Item Drug Screen Score: 0  Interpretation: Negative screen for possible drug use disorder    No results found.     Physical Exam:     /80   Pulse 82   Temp (!) 96.1 °F (35.6 °C) (Tympanic)   Ht 5' (1.524 m)   Wt 41.3 kg (91 lb)   SpO2 97%   BMI 17.77 kg/m²     Physical Exam  Vitals and nursing note reviewed.   Constitutional:       Appearance: She is well-developed.   HENT:      Head: Normocephalic and atraumatic.      Right Ear: External ear normal.      Left Ear: External ear normal.      Nose: Congestion present.      Mouth/Throat:      Pharynx: No posterior oropharyngeal erythema.   Eyes:      Conjunctiva/sclera: Conjunctivae normal.      Pupils: Pupils are equal, round, and reactive to light.   Cardiovascular:      Rate and Rhythm: Normal rate and regular rhythm.      Heart sounds: Normal heart sounds.   Pulmonary:       Effort: Pulmonary effort is normal.      Breath sounds: Normal breath sounds.   Abdominal:      General: Bowel sounds are normal.      Palpations: Abdomen is soft.   Musculoskeletal:         General: Normal range of motion.      Cervical back: Normal range of motion and neck supple.   Skin:     General: Skin is warm and dry.   Neurological:      Mental Status: She is alert and oriented to person, place, and time.   Psychiatric:         Behavior: Behavior normal.         Thought Content: Thought content normal.         Judgment: Judgment normal.          Minnie Aburto, DO

## 2024-02-16 ENCOUNTER — TELEPHONE (OUTPATIENT)
Dept: FAMILY MEDICINE CLINIC | Facility: CLINIC | Age: 64
End: 2024-02-16

## 2024-02-16 LAB
ALBUMIN SERPL-MCNC: 4.2 G/DL (ref 3.6–5.1)
ALBUMIN/GLOB SERPL: 1.8 (CALC) (ref 1–2.5)
ALP SERPL-CCNC: 61 U/L (ref 37–153)
ALT SERPL-CCNC: 14 U/L (ref 6–29)
AST SERPL-CCNC: 18 U/L (ref 10–35)
BASOPHILS # BLD AUTO: 42 CELLS/UL (ref 0–200)
BASOPHILS NFR BLD AUTO: 0.6 %
BILIRUB SERPL-MCNC: 0.4 MG/DL (ref 0.2–1.2)
BUN SERPL-MCNC: 16 MG/DL (ref 7–25)
BUN/CREAT SERPL: NORMAL (CALC) (ref 6–22)
CALCIUM SERPL-MCNC: 9.9 MG/DL (ref 8.6–10.4)
CHLORIDE SERPL-SCNC: 105 MMOL/L (ref 98–110)
CHOLEST SERPL-MCNC: 269 MG/DL
CHOLEST/HDLC SERPL: 3.3 (CALC)
CO2 SERPL-SCNC: 28 MMOL/L (ref 20–32)
CREAT SERPL-MCNC: 0.77 MG/DL (ref 0.5–1.05)
EOSINOPHIL # BLD AUTO: 161 CELLS/UL (ref 15–500)
EOSINOPHIL NFR BLD AUTO: 2.3 %
ERYTHROCYTE [DISTWIDTH] IN BLOOD BY AUTOMATED COUNT: 13 % (ref 11–15)
FERRITIN SERPL-MCNC: 84 NG/ML (ref 16–288)
GFR/BSA.PRED SERPLBLD CYS-BASED-ARV: 86 ML/MIN/1.73M2
GLOBULIN SER CALC-MCNC: 2.3 G/DL (CALC) (ref 1.9–3.7)
GLUCOSE SERPL-MCNC: 89 MG/DL (ref 65–99)
HCT VFR BLD AUTO: 41.2 % (ref 35–45)
HDLC SERPL-MCNC: 81 MG/DL
HGB BLD-MCNC: 14.2 G/DL (ref 11.7–15.5)
LDLC SERPL CALC-MCNC: 166 MG/DL (CALC)
LYMPHOCYTES # BLD AUTO: 1148 CELLS/UL (ref 850–3900)
LYMPHOCYTES NFR BLD AUTO: 16.4 %
MCH RBC QN AUTO: 31.1 PG (ref 27–33)
MCHC RBC AUTO-ENTMCNC: 34.5 G/DL (ref 32–36)
MCV RBC AUTO: 90.4 FL (ref 80–100)
MONOCYTES # BLD AUTO: 560 CELLS/UL (ref 200–950)
MONOCYTES NFR BLD AUTO: 8 %
NEUTROPHILS # BLD AUTO: 5089 CELLS/UL (ref 1500–7800)
NEUTROPHILS NFR BLD AUTO: 72.7 %
NONHDLC SERPL-MCNC: 188 MG/DL (CALC)
PLATELET # BLD AUTO: 226 THOUSAND/UL (ref 140–400)
PMV BLD REES-ECKER: 9.3 FL (ref 7.5–12.5)
POTASSIUM SERPL-SCNC: 4.1 MMOL/L (ref 3.5–5.3)
PROT SERPL-MCNC: 6.5 G/DL (ref 6.1–8.1)
RBC # BLD AUTO: 4.56 MILLION/UL (ref 3.8–5.1)
SODIUM SERPL-SCNC: 141 MMOL/L (ref 135–146)
TRIGL SERPL-MCNC: 104 MG/DL
TSH SERPL-ACNC: 1.52 MIU/L (ref 0.4–4.5)
VIT B12 SERPL-MCNC: 507 PG/ML (ref 200–1100)
WBC # BLD AUTO: 7 THOUSAND/UL (ref 3.8–10.8)

## 2024-02-16 NOTE — TELEPHONE ENCOUNTER
"PT called again to mention that she did have a recent echo done in November, \"I think 19th and not that anything could have happened since, but I'm just letting you know that\"    FYI  "

## 2024-02-16 NOTE — TELEPHONE ENCOUNTER
PT called stating that she had her wellness exam with her EKG and was told that it was normal but after reviewing it in [facility] she said that it is not normal.     She would like for it to be reviewed as she is concerned that it shows she may have had a heart attack at some point or the leads were placed incorrectly.    Please review and advise.

## 2024-02-17 PROBLEM — D75.1 ERYTHROCYTOSIS: Status: ACTIVE | Noted: 2024-02-17

## 2024-02-17 PROBLEM — J20.9 ACUTE BRONCHITIS: Status: RESOLVED | Noted: 2022-11-07 | Resolved: 2024-02-17

## 2024-02-17 PROBLEM — Z00.00 WELCOME TO MEDICARE PREVENTIVE VISIT: Status: ACTIVE | Noted: 2024-02-17

## 2024-02-17 PROBLEM — J01.01 ACUTE RECURRENT MAXILLARY SINUSITIS: Status: ACTIVE | Noted: 2022-06-05

## 2024-02-17 PROBLEM — R53.82 CHRONIC FATIGUE: Status: ACTIVE | Noted: 2024-02-17

## 2024-02-17 PROCEDURE — 93000 ELECTROCARDIOGRAM COMPLETE: CPT | Performed by: FAMILY MEDICINE

## 2024-02-21 DIAGNOSIS — J20.9 ACUTE BRONCHITIS, UNSPECIFIED ORGANISM: ICD-10-CM

## 2024-02-21 PROBLEM — J01.00 SUBACUTE MAXILLARY SINUSITIS: Status: RESOLVED | Noted: 2022-06-05 | Resolved: 2024-02-21

## 2024-02-21 PROBLEM — Z00.00 WELCOME TO MEDICARE PREVENTIVE VISIT: Status: RESOLVED | Noted: 2024-02-17 | Resolved: 2024-02-21

## 2024-02-21 RX ORDER — ALBUTEROL SULFATE 90 UG/1
AEROSOL, METERED RESPIRATORY (INHALATION)
Qty: 8.5 G | Refills: 0 | Status: SHIPPED | OUTPATIENT
Start: 2024-02-21

## 2024-02-22 ENCOUNTER — HOSPITAL ENCOUNTER (OUTPATIENT)
Dept: MAMMOGRAPHY | Facility: IMAGING CENTER | Age: 64
Discharge: HOME | End: 2024-02-22
Payer: MEDICARE

## 2024-02-22 VITALS — WEIGHT: 93 LBS | HEIGHT: 60 IN | BODY MASS INDEX: 18.26 KG/M2

## 2024-02-22 DIAGNOSIS — Z12.31 ENCOUNTER FOR SCREENING MAMMOGRAM FOR BREAST CANCER: ICD-10-CM

## 2024-02-22 PROCEDURE — 77067 SCR MAMMO BI INCL CAD: CPT

## 2024-02-22 PROCEDURE — 77063 BREAST TOMOSYNTHESIS BI: CPT

## 2024-03-08 DIAGNOSIS — J30.89 NON-SEASONAL ALLERGIC RHINITIS, UNSPECIFIED TRIGGER: ICD-10-CM

## 2024-03-08 RX ORDER — MONTELUKAST SODIUM 10 MG/1
10 TABLET ORAL
Qty: 90 TABLET | Refills: 0 | Status: SHIPPED | OUTPATIENT
Start: 2024-03-08

## 2024-03-27 DIAGNOSIS — J20.9 ACUTE BRONCHITIS, UNSPECIFIED ORGANISM: ICD-10-CM

## 2024-03-27 RX ORDER — ALBUTEROL SULFATE 90 UG/1
AEROSOL, METERED RESPIRATORY (INHALATION)
Qty: 8.5 G | Refills: 1 | Status: SHIPPED | OUTPATIENT
Start: 2024-03-27

## 2024-05-06 NOTE — TELEPHONE ENCOUNTER
FAXED [phone]
PATIENT IS REQUESTING A SCRIPT FOR RHINOCORT NASAL SPRAY  SHE WILL BE SENDING THIS TO [place]  [facility]#[id number] ON SCRIPT    PATIENT WOULD LIKE 4 REFILLS      PLEASE FAX [phone] WHEN COMPLETE
Prescription is written for the patient to  she can fax it there herself
Attending Attestation (For Attendings USE Only)...

## 2024-11-27 ENCOUNTER — OFFICE VISIT (OUTPATIENT)
Dept: FAMILY MEDICINE CLINIC | Facility: CLINIC | Age: 64
End: 2024-11-27
Payer: MEDICARE

## 2024-11-27 VITALS
OXYGEN SATURATION: 96 % | TEMPERATURE: 98.2 F | HEART RATE: 84 BPM | HEIGHT: 60 IN | WEIGHT: 95.8 LBS | BODY MASS INDEX: 18.81 KG/M2

## 2024-11-27 DIAGNOSIS — J45.31 MILD PERSISTENT ASTHMATIC BRONCHITIS WITH ACUTE EXACERBATION: ICD-10-CM

## 2024-11-27 PROCEDURE — G2211 COMPLEX E/M VISIT ADD ON: HCPCS | Performed by: FAMILY MEDICINE

## 2024-11-27 PROCEDURE — 99213 OFFICE O/P EST LOW 20 MIN: CPT | Performed by: FAMILY MEDICINE

## 2024-11-27 RX ORDER — DOXYCYCLINE HYCLATE 100 MG
100 TABLET ORAL 2 TIMES DAILY
Qty: 20 TABLET | Refills: 0 | Status: SHIPPED | OUTPATIENT
Start: 2024-11-27 | End: 2024-12-03 | Stop reason: CLARIF

## 2024-11-27 RX ORDER — PREDNISONE 10 MG/1
TABLET ORAL
Qty: 20 TABLET | Refills: 1 | Status: SHIPPED | OUTPATIENT
Start: 2024-11-27

## 2024-11-27 RX ORDER — DOXYCYCLINE 100 MG/1
100 CAPSULE ORAL 2 TIMES DAILY
Qty: 20 CAPSULE | Refills: 1 | Status: SHIPPED | OUTPATIENT
Start: 2024-11-27 | End: 2024-11-27 | Stop reason: ALTCHOICE

## 2024-11-27 NOTE — ASSESSMENT & PLAN NOTE
Start doxycycline 1 tab twice a day  Prednisone taper dose  Orders:    predniSONE 10 mg tablet; 4 tabs for 2 days, 3 tabs for 2 days, 2 tabs for 2 days and 1 tab for 2 days

## 2024-11-27 NOTE — PROGRESS NOTES
Name: Ursula Gold      : 1960      MRN: [id number]  Encounter Provider: Minnie Aburto DO  Encounter Date: 2024   Encounter department: [facility]  :  Assessment & Plan  Mild persistent asthmatic bronchitis with acute exacerbation  Start doxycycline 1 tab twice a day  Prednisone taper dose  Orders:    predniSONE 10 mg tablet; 4 tabs for 2 days, 3 tabs for 2 days, 2 tabs for 2 days and 1 tab for 2 days          Depression Screening and Follow-up Plan: Patient was screened for depression during today's encounter. They screened negative with a PHQ-2 score of 0.      History of Present Illness     Symptoms started 1 weeks ago. Symptoms worse . Started doxycycline that she had left over but ran out of medication  Complains of sinus Pressure, no fever  Covid test was negative.       Review of Systems   Constitutional:  Positive for fatigue. Negative for fever.   HENT:  Positive for congestion, sinus pressure and sinus pain.    Eyes: Negative.    Respiratory:  Positive for cough and shortness of breath.    Cardiovascular: Negative.    Gastrointestinal: Negative.    Endocrine: Negative.    Genitourinary: Negative.    Musculoskeletal: Negative.    Skin: Negative.    Allergic/Immunologic: Negative.    Neurological:  Positive for headaches.   Psychiatric/Behavioral: Negative.       Medical History Reviewed by provider this encounter:  Tobacco  Allergies  Meds  Problems  Med Hx  Surg Hx  Fam Hx     .  Current Outpatient Medications on File Prior to Visit   Medication Sig Dispense Refill    albuterol (PROVENTIL HFA,VENTOLIN HFA) 90 mcg/act inhaler TAKE 2 PUFFS BY MOUTH EVERY 6 HOURS AS NEEDED FOR WHEEZE 8.5 g 1    aspirin 325 mg tablet Take 325 mg by mouth daily      cholecalciferol (VITAMIN D3) 1,000 units tablet Take 1 tablet (1,000 Units total) by mouth daily 90 tablet 3    Multiple Vitamin (MULTIVITAMIN) tablet Take 1 tablet by mouth daily       No current  facility-administered medications on file prior to visit.      Social History     Tobacco Use    Smoking status: Every Day     Current packs/day: 0.50     Types: Cigarettes    Smokeless tobacco: Never   Vaping Use    Vaping status: Never Used   Substance and Sexual Activity    Alcohol use: Yes     Comment: social     Drug use: No    Sexual activity: Yes     Partners: Male     Birth control/protection: None        Objective   Pulse 84   Temp 98.2 °F (36.8 °C) (Tympanic)   Ht 5' (1.524 m)   Wt 43.5 kg (95 lb 12.8 oz)   SpO2 96%   BMI 18.71 kg/m²      Physical Exam  Vitals and nursing note reviewed.   Constitutional:       Appearance: She is well-developed.   HENT:      Head: Normocephalic and atraumatic.      Right Ear: External ear normal.      Left Ear: External ear normal.      Nose: Congestion present.      Mouth/Throat:      Pharynx: No posterior oropharyngeal erythema.   Eyes:      Conjunctiva/sclera: Conjunctivae normal.      Pupils: Pupils are equal, round, and reactive to light.   Cardiovascular:      Rate and Rhythm: Normal rate and regular rhythm.      Heart sounds: Normal heart sounds.   Pulmonary:      Effort: Pulmonary effort is normal.      Breath sounds: Normal breath sounds.   Abdominal:      General: Bowel sounds are normal.      Palpations: Abdomen is soft.   Musculoskeletal:         General: Normal range of motion.      Cervical back: Normal range of motion and neck supple.   Skin:     General: Skin is warm and dry.   Neurological:      Mental Status: She is alert and oriented to person, place, and time.   Psychiatric:         Behavior: Behavior normal.         Thought Content: Thought content normal.         Judgment: Judgment normal.

## 2024-12-03 ENCOUNTER — TELEPHONE (OUTPATIENT)
Age: 64
End: 2024-12-03

## 2024-12-03 DIAGNOSIS — J45.31 MILD PERSISTENT ASTHMATIC BRONCHITIS WITH ACUTE EXACERBATION: Primary | ICD-10-CM

## 2024-12-03 RX ORDER — DOXYCYCLINE 100 MG/1
100 CAPSULE ORAL EVERY 12 HOURS SCHEDULED
Qty: 20 CAPSULE | Refills: 0 | Status: SHIPPED | OUTPATIENT
Start: 2024-12-03 | End: 2024-12-11 | Stop reason: SDUPTHER

## 2024-12-03 NOTE — TELEPHONE ENCOUNTER
Pt called requesting a new script for    doxycycline hyclate be sent to pharmacy stating capsules and not tablets. There is a price difference for the tablets.    Please let pt know when script sent.      [facility]/pharmacy #[id number] - [place] - [phone] [phone]

## 2024-12-06 DIAGNOSIS — E78.2 MIXED HYPERLIPIDEMIA: ICD-10-CM

## 2024-12-06 DIAGNOSIS — R53.82 CHRONIC FATIGUE: ICD-10-CM

## 2024-12-06 DIAGNOSIS — R06.02 SHORTNESS OF BREATH: ICD-10-CM

## 2024-12-06 DIAGNOSIS — E64.0 SEQUELAE OF PROTEIN-CALORIE MALNUTRITION (HCC): ICD-10-CM

## 2024-12-06 DIAGNOSIS — R79.9 ABNORMAL FINDING OF BLOOD CHEMISTRY, UNSPECIFIED: ICD-10-CM

## 2024-12-06 DIAGNOSIS — D75.1 ERYTHROCYTOSIS: Primary | ICD-10-CM

## 2024-12-09 NOTE — TELEPHONE ENCOUNTER
Pt received two calls this morning and wanted to confirm what it was about; spoke with Vivian in office who confirmed it was regarding the new bloodwork orders.

## 2024-12-11 ENCOUNTER — TELEPHONE (OUTPATIENT)
Age: 64
End: 2024-12-11

## 2024-12-11 DIAGNOSIS — J45.31 MILD PERSISTENT ASTHMATIC BRONCHITIS WITH ACUTE EXACERBATION: ICD-10-CM

## 2024-12-11 RX ORDER — DOXYCYCLINE 100 MG/1
100 CAPSULE ORAL EVERY 12 HOURS SCHEDULED
Qty: 20 CAPSULE | Refills: 0 | Status: SHIPPED | OUTPATIENT
Start: 2024-12-11 | End: 2024-12-21

## 2024-12-11 NOTE — TELEPHONE ENCOUNTER
Yolanda called in because she finished taking the Antibiotics last Tuesday. Feel like left ear is hurting. She still feels lingering on sinus and chest. She would like to know if she should get a new script for antibiotics to have ready incase she gets worse. She said she can handle the Droxy but does not think her stomach can handle the Bactrim. She asked if she can be called.

## 2024-12-26 ENCOUNTER — TELEPHONE (OUTPATIENT)
Age: 64
End: 2024-12-26

## 2024-12-26 DIAGNOSIS — J30.89 NON-SEASONAL ALLERGIC RHINITIS, UNSPECIFIED TRIGGER: ICD-10-CM

## 2024-12-26 DIAGNOSIS — J30.89 NON-SEASONAL ALLERGIC RHINITIS, UNSPECIFIED TRIGGER: Primary | ICD-10-CM

## 2024-12-26 RX ORDER — BUDESONIDE 32 MCG
1 AEROSOL, SPRAY WITH PUMP (ML) NASAL DAILY
Qty: 25.29 ML | Refills: 3 | Status: SHIPPED | OUTPATIENT
Start: 2024-12-26 | End: 2024-12-26 | Stop reason: SDUPTHER

## 2024-12-26 RX ORDER — BUDESONIDE 32 MCG
1 AEROSOL, SPRAY WITH PUMP (ML) NASAL DAILY
Qty: 25.29 ML | Refills: 3 | Status: SHIPPED | OUTPATIENT
Start: 2024-12-26 | End: 2025-03-26

## 2024-12-26 NOTE — TELEPHONE ENCOUNTER
Patient would like prescription refill for Rhinocort AQ Nasal spray 32 mcg (refill for a year). Refill needs to be brand name not generic. Please send it to [facility] Fax # [phone]  Did not see the medication on patient's chart.  Thank you!!

## 2025-02-10 ENCOUNTER — OFFICE VISIT (OUTPATIENT)
Dept: FAMILY MEDICINE CLINIC | Facility: CLINIC | Age: 65
End: 2025-02-10
Payer: MEDICARE

## 2025-02-10 ENCOUNTER — HOSPITAL ENCOUNTER (OUTPATIENT)
Dept: RADIOLOGY | Facility: HOSPITAL | Age: 65
Discharge: HOME | End: 2025-02-10
Payer: MEDICARE

## 2025-02-10 VITALS
OXYGEN SATURATION: 96 % | SYSTOLIC BLOOD PRESSURE: 132 MMHG | TEMPERATURE: 98.7 F | HEART RATE: 98 BPM | DIASTOLIC BLOOD PRESSURE: 88 MMHG | WEIGHT: 95.75 LBS | HEIGHT: 60 IN | BODY MASS INDEX: 18.8 KG/M2

## 2025-02-10 DIAGNOSIS — M79.602 LEFT ARM PAIN: ICD-10-CM

## 2025-02-10 DIAGNOSIS — M79.602 LEFT ARM PAIN: Primary | ICD-10-CM

## 2025-02-10 PROCEDURE — 99213 OFFICE O/P EST LOW 20 MIN: CPT

## 2025-02-10 PROCEDURE — G2211 COMPLEX E/M VISIT ADD ON: HCPCS

## 2025-02-10 PROCEDURE — 73030 X-RAY EXAM OF SHOULDER: CPT

## 2025-02-10 PROCEDURE — 73060 X-RAY EXAM OF HUMERUS: CPT

## 2025-02-10 RX ORDER — LIDOCAINE 50 MG/G
1 PATCH TOPICAL DAILY
Qty: 30 PATCH | Refills: 0 | Status: SHIPPED | OUTPATIENT
Start: 2025-02-10

## 2025-02-10 NOTE — PROGRESS NOTES
Name: Ursula Gold      : 1960      MRN: [id number]  Encounter Provider: Luci Gilbert DO  Encounter Date: 2/10/2025   Encounter department: [facility]  :  Assessment & Plan  Left arm pain  -suspect strain of rotator cuff     Plan:  Will xray left humerus and shoulder to rule out fracture   Supportive care: rest, ice/heat, can alternate tylenol and ibuprofen as needed for pain, can trial lidocaine patches or gel   Referral to physical therapy  Orders:    XR humerus left; Future    XR shoulder 2+ vw left; Future    Ambulatory Referral to Physical Therapy; Future    lidocaine (Lidoderm) 5 %; Apply 1 patch topically over 12 hours daily Remove & Discard patch within 12 hours or as directed by MD           History of Present Illness   Presents for acute visit. Slipped on ice yesterday and landed on her left arm.     This morning, arm is very stiff and painful. Explains the pain is mostly in her forearm. Took an aspirin this AM with some improvement. Arm is painful to move.      Review of Systems   Constitutional:  Negative for chills and fever.   HENT:  Negative for ear pain and sore throat.    Eyes:  Negative for pain and visual disturbance.   Respiratory:  Negative for cough and shortness of breath.    Cardiovascular:  Negative for chest pain and palpitations.   Gastrointestinal:  Negative for abdominal pain and vomiting.   Genitourinary:  Negative for dysuria and hematuria.   Musculoskeletal:  Positive for arthralgias and myalgias. Negative for back pain.   Skin:  Negative for color change and rash.   Neurological:  Negative for seizures and syncope.   All other systems reviewed and are negative.      Objective   /88   Pulse 98   Temp 98.7 °F (37.1 °C) (Tympanic)   Ht 5' (1.524 m)   Wt 43.4 kg (95 lb 12 oz)   SpO2 96%   BMI 18.70 kg/m²      Physical Exam  Constitutional:       General: She is not in acute distress.     Appearance: Normal appearance. She is not  ill-appearing or toxic-appearing.   HENT:      Head: Normocephalic and atraumatic.      Right Ear: External ear normal.      Left Ear: External ear normal.      Nose: Nose normal.   Eyes:      Conjunctiva/sclera: Conjunctivae normal.   Cardiovascular:      Rate and Rhythm: Normal rate and regular rhythm.      Heart sounds: Normal heart sounds. No murmur heard.  Pulmonary:      Effort: Pulmonary effort is normal. No respiratory distress.      Breath sounds: Normal breath sounds. No wheezing, rhonchi or rales.   Musculoskeletal:      Left shoulder: No swelling, tenderness, bony tenderness (no tenderness or bony tenderness to palpation) or crepitus. Decreased range of motion (discomfort with abduction, especially beyond 90 degrees,  and adduction, internal and external rotation). Normal strength.      Comments: 5/5 strength bilateral upper extremities. Sensation intact left upper extremity.   Skin:     General: Skin is warm and dry.   Neurological:      General: No focal deficit present.      Mental Status: She is alert and oriented to person, place, and time.   Psychiatric:         Mood and Affect: Mood normal.         Behavior: Behavior normal.

## 2025-02-11 ENCOUNTER — RESULTS FOLLOW-UP (OUTPATIENT)
Dept: FAMILY MEDICINE CLINIC | Facility: CLINIC | Age: 65
End: 2025-02-11

## 2025-02-11 ENCOUNTER — TELEPHONE (OUTPATIENT)
Age: 65
End: 2025-02-11

## 2025-02-11 NOTE — TELEPHONE ENCOUNTER
PA for Lidocaine (Lidoderm) 5 % SUBMITTED to [facility]    via    [x]CMM-KEY: [id number]  []Surescripts-Case ID #   []Availity-Auth ID #  []Faxed to plan   []Other website   []Phone call Case ID #     [x]PA sent as URGENT    All office notes, labs and other pertaining documents and studies sent. Clinical questions answered. Awaiting determination from insurance company.     Turnaround time for your insurance to make a decision on your Prior Authorization can take 7-21 business days.

## 2025-02-19 ENCOUNTER — TELEPHONE (OUTPATIENT)
Dept: FAMILY MEDICINE CLINIC | Facility: CLINIC | Age: 65
End: 2025-02-19

## 2025-02-19 NOTE — TELEPHONE ENCOUNTER
Spoke to pt  clarified appt for 2/20/25 is just for bloodwork and scheduled her annual AWV for 3/20 10;15 arrival

## 2025-02-20 ENCOUNTER — TELEPHONE (OUTPATIENT)
Age: 65
End: 2025-02-20

## 2025-02-20 ENCOUNTER — CLINICAL SUPPORT (OUTPATIENT)
Dept: FAMILY MEDICINE CLINIC | Facility: CLINIC | Age: 65
End: 2025-02-20
Payer: MEDICARE

## 2025-02-20 DIAGNOSIS — R06.02 SHORTNESS OF BREATH: ICD-10-CM

## 2025-02-20 DIAGNOSIS — E78.2 MIXED HYPERLIPIDEMIA: ICD-10-CM

## 2025-02-20 DIAGNOSIS — R79.9 ABNORMAL FINDING OF BLOOD CHEMISTRY, UNSPECIFIED: ICD-10-CM

## 2025-02-20 DIAGNOSIS — R53.82 CHRONIC FATIGUE: ICD-10-CM

## 2025-02-20 DIAGNOSIS — D75.1 ERYTHROCYTOSIS: ICD-10-CM

## 2025-02-20 DIAGNOSIS — E64.0 SEQUELAE OF PROTEIN-CALORIE MALNUTRITION (HCC): ICD-10-CM

## 2025-02-20 PROCEDURE — 36415 COLL VENOUS BLD VENIPUNCTURE: CPT | Performed by: FAMILY MEDICINE

## 2025-02-20 NOTE — TELEPHONE ENCOUNTER
Pt calling in; she received a notification about her AVS being ready and saw a bunch of diagnoses on the visit and she was confused because none of that was discussed when she got her blood drawn, it was a quick in and out visit. Tried to call office but unavailable at time of call.    Please advise if this was correct for pt's visit and give her a call back to explain further, TY.  Callback # [phone]

## 2025-02-21 LAB
ALBUMIN SERPL-MCNC: 4.2 G/DL (ref 3.6–5.1)
ALBUMIN/GLOB SERPL: 2 (CALC) (ref 1–2.5)
ALP SERPL-CCNC: 63 U/L (ref 37–153)
ALT SERPL-CCNC: 20 U/L (ref 6–29)
AST SERPL-CCNC: 24 U/L (ref 10–35)
BASOPHILS # BLD AUTO: 62 CELLS/UL (ref 0–200)
BASOPHILS NFR BLD AUTO: 1.3 %
BILIRUB SERPL-MCNC: 0.6 MG/DL (ref 0.2–1.2)
BUN SERPL-MCNC: 19 MG/DL (ref 7–25)
BUN/CREAT SERPL: NORMAL (CALC) (ref 6–22)
CALCIUM SERPL-MCNC: 9.5 MG/DL (ref 8.6–10.4)
CHLORIDE SERPL-SCNC: 103 MMOL/L (ref 98–110)
CHOLEST SERPL-MCNC: 289 MG/DL
CHOLEST/HDLC SERPL: 3.2 (CALC)
CO2 SERPL-SCNC: 28 MMOL/L (ref 20–32)
CREAT SERPL-MCNC: 0.83 MG/DL (ref 0.5–1.05)
EOSINOPHIL # BLD AUTO: 192 CELLS/UL (ref 15–500)
EOSINOPHIL NFR BLD AUTO: 4 %
ERYTHROCYTE [DISTWIDTH] IN BLOOD BY AUTOMATED COUNT: 12.9 % (ref 11–15)
FERRITIN SERPL-MCNC: 44 NG/ML (ref 16–288)
GFR/BSA.PRED SERPLBLD CYS-BASED-ARV: 78 ML/MIN/1.73M2
GLOBULIN SER CALC-MCNC: 2.1 G/DL (CALC) (ref 1.9–3.7)
GLUCOSE SERPL-MCNC: 92 MG/DL (ref 65–99)
HCT VFR BLD AUTO: 45.3 % (ref 35–45)
HDLC SERPL-MCNC: 90 MG/DL
HGB BLD-MCNC: 15.9 G/DL (ref 11.7–15.5)
LDLC SERPL CALC-MCNC: 175 MG/DL (CALC)
LYMPHOCYTES # BLD AUTO: 1574 CELLS/UL (ref 850–3900)
LYMPHOCYTES NFR BLD AUTO: 32.8 %
MCH RBC QN AUTO: 32.5 PG (ref 27–33)
MCHC RBC AUTO-ENTMCNC: 35.1 G/DL (ref 32–36)
MCV RBC AUTO: 92.6 FL (ref 80–100)
MONOCYTES # BLD AUTO: 566 CELLS/UL (ref 200–950)
MONOCYTES NFR BLD AUTO: 11.8 %
NEUTROPHILS # BLD AUTO: 2405 CELLS/UL (ref 1500–7800)
NEUTROPHILS NFR BLD AUTO: 50.1 %
NONHDLC SERPL-MCNC: 199 MG/DL (CALC)
PLATELET # BLD AUTO: 274 THOUSAND/UL (ref 140–400)
PMV BLD REES-ECKER: 9.3 FL (ref 7.5–12.5)
POTASSIUM SERPL-SCNC: 4.1 MMOL/L (ref 3.5–5.3)
PROT SERPL-MCNC: 6.3 G/DL (ref 6.1–8.1)
RBC # BLD AUTO: 4.89 MILLION/UL (ref 3.8–5.1)
SODIUM SERPL-SCNC: 140 MMOL/L (ref 135–146)
TRIGL SERPL-MCNC: 111 MG/DL
TSH SERPL-ACNC: 1.55 MIU/L (ref 0.4–4.5)
VIT B12 SERPL-MCNC: 379 PG/ML (ref 200–1100)
WBC # BLD AUTO: 4.8 THOUSAND/UL (ref 3.8–10.8)

## 2025-03-20 ENCOUNTER — TELEPHONE (OUTPATIENT)
Age: 65
End: 2025-03-20

## 2025-03-20 ENCOUNTER — OFFICE VISIT (OUTPATIENT)
Dept: FAMILY MEDICINE CLINIC | Facility: CLINIC | Age: 65
End: 2025-03-20
Payer: MEDICARE

## 2025-03-20 VITALS
BODY MASS INDEX: 18.98 KG/M2 | TEMPERATURE: 97.2 F | SYSTOLIC BLOOD PRESSURE: 118 MMHG | HEIGHT: 59 IN | HEART RATE: 86 BPM | DIASTOLIC BLOOD PRESSURE: 74 MMHG | WEIGHT: 94.14 LBS | OXYGEN SATURATION: 97 %

## 2025-03-20 DIAGNOSIS — Z00.00 MEDICARE ANNUAL WELLNESS VISIT, SUBSEQUENT: Primary | ICD-10-CM

## 2025-03-20 DIAGNOSIS — F17.210 CIGARETTE NICOTINE DEPENDENCE WITHOUT COMPLICATION: ICD-10-CM

## 2025-03-20 DIAGNOSIS — E78.2 MIXED HYPERLIPIDEMIA: ICD-10-CM

## 2025-03-20 DIAGNOSIS — Z12.31 ENCOUNTER FOR SCREENING MAMMOGRAM FOR BREAST CANCER: ICD-10-CM

## 2025-03-20 PROCEDURE — G0402 INITIAL PREVENTIVE EXAM: HCPCS | Performed by: FAMILY MEDICINE

## 2025-03-20 NOTE — PROGRESS NOTES
Name: Ursula Gold      : 1960      MRN: [id number]  Encounter Provider: Minnie Aburto DO  Encounter Date: 3/20/2025   Encounter department: [facility]    Assessment & Plan  Medicare annual wellness visit, subsequent         Mixed hyperlipidemia  Discussed ct coronary calcium score  Pt does not want to take a statin         Cigarette nicotine dependence without complication  Schedule ct lung cancer screen  Discussed smoking cessation  Orders:    CT lung screening program; Future    Encounter for screening mammogram for breast cancer  Schedule mammogram  Orders:    Mammo screening bilateral w 3d and cad; Future      Depression Screening and Follow-up Plan: Patient was screened for depression during today's encounter. They screened negative with a PHQ-2 score of 0.      Falls Plan of Care: balance, strength, and gait training instructions were provided.     Urinary Incontinence Plan of Care: counseling topics discussed: practice Kegel (pelvic floor strengthening) exercises, use restroom every 2 hours, limit alcohol, caffeine, spicy foods, and acidic foods, limiting fluid intake 3-4 hours before bed and preventing constipation.     Tobacco Cessation Counseling: Tobacco cessation counseling was provided. The patient is sincerely urged to quit consumption of tobacco. She is not ready to quit tobacco. Medication options discussed. Side effects of medication not discussed. Patient refused medication.       Preventive health issues were discussed with patient, and age appropriate screening tests were ordered as noted in patient's After Visit Summary. Personalized health advice and appropriate referrals for health education or preventive services given if needed, as noted in patient's After Visit Summary.    History of Present Illness     Pt is here for medicare wellness.          Patient Care Team:  Minnie Aburto DO as PCP - General (Family Medicine)  Capri Lyles DO as PCP - OBGYN  (Obstetrics and Gynecology)  MD Capri Barker, DO    Review of Systems   Constitutional: Negative.  Negative for fatigue and fever.   HENT: Negative.     Eyes: Negative.    Respiratory: Negative.  Negative for cough.    Cardiovascular: Negative.    Gastrointestinal: Negative.    Endocrine: Negative.    Genitourinary: Negative.    Musculoskeletal:  Positive for arthralgias and myalgias.   Skin: Negative.    Allergic/Immunologic: Negative.    Psychiatric/Behavioral: Negative.       Medical History Reviewed by provider this encounter:  Tobacco  Allergies  Meds  Problems  Med Hx  Surg Hx  Fam Hx       Annual Wellness Visit Questionnaire   Ursula is here for her Subsequent Wellness visit. Last Medicare Wellness visit information reviewed, patient interviewed, no change since last AWV.     Health Risk Assessment:   Patient rates overall health as good. Patient feels that their physical health rating is same. Patient is satisfied with their life. Eyesight was rated as same. Hearing was rated as same. Patient feels that their emotional and mental health rating is same. Patients states they are never, rarely angry. Patient states they are sometimes unusually tired/fatigued. Pain experienced in the last 7 days has been some. Patient's pain rating has been 4/10. Patient states that she has experienced no weight loss or gain in last 6 months.     Depression Screening:   PHQ-2 Score: 0      Fall Risk Screening:   In the past year, patient has experienced: history of falling in past year    Number of falls: 1  Injured during fall?: Yes    Feels unsteady when standing or walking?: No    Worried about falling?: No      Urinary Incontinence Screening:   Patient has leaked urine accidently in the last six months.     Home Safety:  Patient does not have trouble with stairs inside or outside of their home. Patient has working smoke alarms and has working carbon monoxide detector. Home safety hazards include: none.      Nutrition:   Current diet is Regular and No Added Salt.     Medications:   Patient is currently taking over-the-counter supplements. OTC medications include: see medication list. Patient is able to manage medications.     Activities of Daily Living (ADLs)/Instrumental Activities of Daily Living (IADLs):   Walk and transfer into and out of bed and chair?: Yes  Dress and groom yourself?: Yes    Bathe or shower yourself?: Yes    Feed yourself? Yes  Do your laundry/housekeeping?: Yes  Manage your money, pay your bills and track your expenses?: Yes  Make your own meals?: Yes    Do your own shopping?: Yes    Previous Hospitalizations:   Any hospitalizations or ED visits within the last 12 months?: No      PREVENTIVE SCREENINGS      Cardiovascular Screening:    General: Screening Not Indicated and History Lipid Disorder      Diabetes Screening:     General: Screening Current      Colorectal Cancer Screening:     General: Screening Current      Breast Cancer Screening:     General: Screening Current      Cervical Cancer Screening:    General: Screening Not Indicated      Osteoporosis Screening:    General: Screening Not Indicated and History Osteoporosis      Lung Cancer Screening:     General: Screening Not Indicated      Hepatitis C Screening:    General: Screening Current    Screening, Brief Intervention, and Referral to Treatment (SBIRT)     Screening  Typical number of drinks in a day: 1  Typical number of drinks in a week: 0  Interpretation: Low risk drinking behavior.    AUDIT-C Screenin) How often did you have a drink containing alcohol in the past year? monthly or less  2) How many drinks did you have on a typical day when you were drinking in the past year? 1 to 2  3) How often did you have 6 or more drinks on one occasion in the past year? never    AUDIT-C Score: 1  Interpretation: Score 0-2 (female): Negative screen for alcohol misuse    Single Item Drug Screening:  How often have you used an illegal  "drug (including marijuana) or a prescription medication for non-medical reasons in the past year? never    Single Item Drug Screen Score: 0  Interpretation: Negative screen for possible drug use disorder    Social Drivers of Health     Financial Resource Strain: Low Risk  (2/15/2024)    Overall Financial Resource Strain (CARDIA)     Difficulty of Paying Living Expenses: Not hard at all   Food Insecurity: No Food Insecurity (3/20/2025)    Hunger Vital Sign     Worried About Running Out of Food in the Last Year: Never true     Ran Out of Food in the Last Year: Never true   Transportation Needs: No Transportation Needs (3/20/2025)    PRAPARE - Transportation     Lack of Transportation (Medical): No     Lack of Transportation (Non-Medical): No   Housing Stability: Low Risk  (3/20/2025)    Housing Stability Vital Sign     Unable to Pay for Housing in the Last Year: No     Number of Times Moved in the Last Year: 0     Homeless in the Last Year: No   Utilities: Not At Risk (3/20/2025)    [facility] Utilities     Threatened with loss of utilities: No     No results found.    Objective   /74   Pulse 86   Temp (!) 97.2 °F (36.2 °C) (Tympanic)   Ht 4' 11\" (1.499 m)   Wt 42.7 kg (94 lb 2.2 oz)   SpO2 97%   BMI 19.01 kg/m²     Physical Exam  Vitals and nursing note reviewed.   Constitutional:       Appearance: She is well-developed.   HENT:      Head: Normocephalic and atraumatic.      Right Ear: External ear normal.      Left Ear: External ear normal.      Nose: Nose normal.   Eyes:      Conjunctiva/sclera: Conjunctivae normal.      Pupils: Pupils are equal, round, and reactive to light.   Cardiovascular:      Rate and Rhythm: Normal rate and regular rhythm.      Heart sounds: Normal heart sounds.   Pulmonary:      Effort: Pulmonary effort is normal.      Breath sounds: Normal breath sounds.   Abdominal:      General: Bowel sounds are normal.      Palpations: Abdomen is soft.   Musculoskeletal:         General: Normal " range of motion.      Cervical back: Normal range of motion and neck supple.   Skin:     General: Skin is warm and dry.   Neurological:      Mental Status: She is alert and oriented to person, place, and time.   Psychiatric:         Behavior: Behavior normal.         Thought Content: Thought content normal.         Judgment: Judgment normal.

## 2025-03-20 NOTE — PATIENT INSTRUCTIONS
"Schedule mammogram  Schedule ct lung cancer screen   Ct coronary calcium score     Patient Education     Low-fat diet   The Basics   Written by the doctors and editors at [facility]   What are fats? -- The fat in the food you eat is often classified into 2 groups:   \"Healthy\" fats - These are monounsaturated or polyunsaturated fats. They tend to be more liquid at room temperature. Healthy fats are found in things like olive oil, canola oil, and sesame oil. They are also found in nuts, seeds, avocados, and nut butters.   \"Unhealthy\" fats - These are saturated and trans fats. Saturated fats are animal fats. Trans fats are artificial fats, like partially hydrogenated oils. These fats raise your cholesterol. Unhealthy fats tend to be more solid at room temperature. They are found in meats, egg yolks, butter, cheese, and full-fat milk products. They are also found in some fried foods, butter, margarine, and baked goods like cookies or cakes.  Why do I need a low-fat diet? -- The amounts and kinds of fats you eat can affect your health. This is especially true if you have specific conditions such as blocked arteries or gallbladder problems. Eating fewer unhealthy fats is 1 way to improve your health.  Some people try to eat less fat because they want to lose weight or avoid gaining weight. But this does not always work. That's because weight gain is related to how many calories you eat, no matter what foods they come from. Eating fewer unhealthy fats can be an important part of a weight loss plan. But it's also important to be aware of how many calories you are getting from other foods.  What can I eat and drink on a low-fat diet? -- Choose foods with healthy fats.  Foods with healthy fats include:   Canola, peanut, and olive oil   Safflower, sunflower, soybean, and corn oil   Walnuts, almonds, pecans, hazelnuts, cashews, and peanuts   Pumpkin, sesame, flax, and sunflower seeds   [place], tuna, and some other fish   Tofu   " "Soy milk   Avocado  Other healthy foods with lower amounts of fats include:   Fat-free (non-fat) or low-fat milk, yogurt, and cheese   Light, low-fat or fat-free cream cheese and sour cream   Dried beans, lentils, and tofu   Fruits and vegetables   Whole-grain breads, cereals, pastas, and rice   High-fiber foods, like oatmeal, fruits, beans, and nuts. These have soluble fiber, which helps lower cholesterol in the body.   Deli ham, turkey, chicken breast, and lean roast beef  What foods and drinks should I limit on a low-fat diet? -- It is important to limit certain foods with unhealthy fats.  Limit these types of foods that have saturated fats:   Whole-fat dairy products like cheese, ice cream, whole milk, and cream   High-fat meats like beef, lamb, poultry with the skin, arreaga, hot dogs, and sausage   Processed deli meats like bologna, pepperoni, and salami   Butter and lard   Palm and coconut oils   Mayonnaise, salad dressings, gravies, and sauces  Limit these types of foods that might have trans fats:   Granola, candy, and baked goods like cookies, cakes, doughnuts, and muffins   Pizza dough, and pie crusts that are packaged   Fried foods   Frozen dinners   Chips and crackers   Microwave popcorn   Stick margarine and vegetable shortenings  What else should I know? -- You do not have to remove all fat from your diet. Instead, pay attention to the amount and kinds of fats that you eat.   Read the labels of store-bought foods (figure 1) to find out how much fat is in each. Under 5 percent of total fat on a label means that it is \"low fat.\" Over 20 percent of total fat on a label means that it is \"high fat.\" Avoid foods with \"partially hydrogenated oil\" in the ingredient list. This means that there is trans fat in the food.   Change how you cook to help lower the amount of fat in your food:   Remove the fatty parts of meat and the skin from poultry before cooking   Bake, broil, grill, poach, or roast poultry, fish, " "and lean meats   Drain and throw away the fat that comes out of meat as you cook it   Try to add little or no fat to foods   Use olive or canola oil for cooking or baking   Steam your vegetables   Use herbs or no-oil marinades to flavor foods  All topics are updated as new evidence becomes available and our peer review process is complete.  This topic retrieved from [employer] on: Mar 13, 2024.  Topic 725260 Version 4.0  Release: 32.2.4 - C32.71  © 2024 UpToDate, Inc. and/or its affiliates. All rights reserved.  figure 1: Nutrition label - Fats     This is an example of a nutrition label. To figure out how much and what kinds of fats are in a food, look for the lines that say \"Saturated Fat\" and \"Trans Fat.\" It's also important to look at the serving size. This food has 3 grams of saturated fat and 2 grams of trans fat in each serving, and each serving is 2 tablespoons (32 grams).  Graphic 771140 Version 1.0  Consumer Information Use and Disclaimer   Disclaimer: This generalized information is a limited summary of diagnosis, treatment, and/or medication information. It is not meant to be comprehensive and should be used as a tool to help the user understand and/or assess potential diagnostic and treatment options. It does NOT include all information about conditions, treatments, medications, side effects, or risks that may apply to a specific patient. It is not intended to be medical advice or a substitute for the medical advice, diagnosis, or treatment of a health care provider based on the health care provider's examination and assessment of a patient's specific and unique circumstances. Patients must speak with a health care provider for complete information about their health, medical questions, and treatment options, including any risks or benefits regarding use of medications. This information does not endorse any treatments or medications as safe, effective, or approved for treating a specific patient. " UpToDate, Inc. and its affiliates disclaim any warranty or liability relating to this information or the use thereof.The use of this information is governed by the Terms of Use, available at https://www.[employer]wer.com/en/know/clinical-effectiveness-terms. 2024© UpToDate, Inc. and its affiliates and/or licensors. All rights reserved.  Copyright   © 2024 UpToDate, Inc. and/or its affiliates. All rights reserved.    Patient Education     Mediterranean diet   The Basics   Written by the doctors and editors at [employer]   What is a Mediterranean diet? -- A Mediterranean diet is a heart-healthy way of eating. It includes foods and cooking styles from many countries around the [place], like [place] and [place]. The exact foods included vary from place to place.  A Mediterranean diet involves eating a lot of fruits, vegetables, nuts, and whole grains. It uses olive oil instead of other fats. It also includes some fish, poultry, and dairy products, but not a lot of red meat.  Wine is often thought of as part of a Mediterranean diet. It is not needed, and you might choose not to include it. If you do drink alcohol, limit the amount to:   For females, no more than 1 drink a day   For males, no more than 2 drinks a day  What are the benefits of a Mediterranean diet? -- A Mediterranean diet can help you:   Improve your overall health, and help you lose weight   Lower your risk of stroke   Lower your risk of heart problems such as a heart attack   Manage your blood sugar if you have diabetes  What can I eat and drink on a Mediterranean diet? -- A Mediterranean diet is more of an eating pattern than a strict diet. Try to cover two-thirds of your plate with fresh fruits and vegetables. Some examples of foods that are often part of this pattern:   Grains - Whole grains like whole-grain bread and pasta, oats, couscous, brown rice, barley, and orzo.   Fruits - Many kinds and colors of fresh, frozen, dried, or canned fruits.  Frozen or canned fruits with 100% fruit juice or water (without added sugar). Examples include apples, pears, berries, melons, bananas, plums, raisins, figs, and peaches.   Vegetables - Many kinds and colors of fresh, frozen, or canned vegetables. If canned, low sodium or salt free. If frozen, without added fat and sodium. Examples include avocados, peppers, tomatoes, spinach, kale, beans, carrots, peas, olives, cucumbers, hummus, soybeans, lentils, and kidney beans.   Dairy - Low-fat milk, cheese, and other dairy products. Greek yogurt, kefir, and plant-based milk alternatives like soy milk.   Lean meats, poultry, seafood, and proteins - [place], tuna, cod, and other fish. Shrimp, clams, scallops, and mussels. White meat chicken and turkey, eggs, dried beans, lentils, and tofu. Nuts such as walnuts, almonds, pecans, hazelnuts, cashews, peanuts, and nut butters. Seeds such as pumpkin, sesame, flax, and sunflower seeds.   Fats, oils, and other foods - Foods with healthy fats found in fish, nuts, and avocados. Olive oil or vegetable oils such as canola oil. Use onions, garlic, spices, and herbs to season food.  What foods and drinks should I avoid or limit on a Mediterranean diet? -- A Mediterranean diet involves avoiding or limiting certain types of foods. Try to avoid foods with additives like artificial sweeteners. Avoid foods that are processed, refined, or preserved. These are often foods with a very long shelf life.   Grains to avoid - White bread, pasta, white rice, crackers, and biscuits.   Fruits to avoid - Fruits canned or frozen with extra sugar.   Vegetables to avoid - Commercially prepared potatoes and vegetable mixes, regular canned vegetables and juices, and vegetables frozen with sauce or pickled vegetables.   Dairy to avoid - Whole-fat dairy products like cheese, ice cream, whole milk, cream, and buttermilk.   Lean meats, poultry, seafood, and proteins to avoid - Red meat such as beef, pork, and lamb.  Processed meats such as sausages, deli meats, salami, hot dogs, and arreaga.   Fats, oils, and other foods to avoid - Butter, margarine, lard, gravies, sauces, and salad dressing. Cookies, cakes, candy, doughnuts, muffins, and other sweets.  All topics are updated as new evidence becomes available and our peer review process is complete.  This topic retrieved from [employer] on: Apr 04, 2024.  Topic 289488 Version 2.0  Release: 32.2.4 - C32.93  © 2024 UpToDate, Inc. and/or its affiliates. All rights reserved.  Consumer Information Use and Disclaimer   Disclaimer: This generalized information is a limited summary of diagnosis, treatment, and/or medication information. It is not meant to be comprehensive and should be used as a tool to help the user understand and/or assess potential diagnostic and treatment options. It does NOT include all information about conditions, treatments, medications, side effects, or risks that may apply to a specific patient. It is not intended to be medical advice or a substitute for the medical advice, diagnosis, or treatment of a health care provider based on the health care provider's examination and assessment of a patient's specific and unique circumstances. Patients must speak with a health care provider for complete information about their health, medical questions, and treatment options, including any risks or benefits regarding use of medications. This information does not endorse any treatments or medications as safe, effective, or approved for treating a specific patient. UpToDate, Inc. and its affiliates disclaim any warranty or liability relating to this information or the use thereof.The use of this information is governed by the Terms of Use, available at https://www.woltersWandrianuwer.com/en/know/clinical-effectiveness-terms. 2024© UpToDate, Inc. and its affiliates and/or licensors. All rights reserved.  Copyright   © 2024 UpToDate, Inc. and/or its affiliates. All rights  "reserved.    Patient Education     Quitting smoking   The Basics   Written by the doctors and editors at [facility]   What are the benefits of quitting smoking? -- Quitting smoking can dramatically improve your health and help you live longer. It lowers your risk of heart disease, lung disease, kidney failure, cancer, infection, stomach problems, and diabetes.  Quitting smoking can also lower your chances of getting osteoporosis, a condition that makes your bones weak.  Quitting is not easy for most people, and it might take several tries to completely quit. But help and support are available. Quitting smoking will improve your health no matter how old you are, even if you have smoked for a long time.  What should I do if I want to quit smoking? -- It's a good idea to start by talking with your doctor or nurse. It is possible to quit on your own, without help. But getting help greatly increases your chances of quitting successfully.  When you are ready to quit, you will make a plan to:   Set a quit date.   Tell your family and friends that you plan to quit.   Plan ahead for the challenges you will face, such as cigarette cravings.   Remove cigarettes from your home, car, and work.  How can my doctor or nurse help? -- Your doctor or nurse can give you advice on the best way to quit. They can also give you medicines to:   Reduce your craving for cigarettes   Reduce your \"withdrawal\" symptoms (symptoms that happen when you stop smoking)  Your doctor or nurse can also help you find a counselor to talk to. For most people who are trying to quit smoking, it works best to use both medicines and counseling.  You can also get help from a free phone line (9-458-QUIT-NOW, or 1-489.283.1839) or go online to www.smokefree.gov.  What are the symptoms of withdrawal? -- When you stop smoking, you might have symptoms such as:   Trouble sleeping   Feeling irritable, anxious, or restless   Getting frustrated or angry   Having trouble " thinking clearly  These symptoms can be hard to deal with, which is why it can be so hard to quit. But medicines can help.  Some people who stop smoking become temporarily depressed. Some people need treatment for depression, such as counseling or medicines or both. People with depression might:   No longer enjoy or care about doing the things they used to like to do   Feel sad, down, hopeless, nervous, or cranky most of the day, almost every day   Lose or gain weight   Sleep too much or too little   Feel tired or like they have no energy   Feel guilty or like they are worth nothing   Forget things or feel confused   Move and speak more slowly than usual   Act restless or have trouble staying still   Think about death or suicide  If you think you might be depressed, tell your doctor or nurse right away. They can talk to you about your symptoms and recommend treatment if needed.  Get help right away if you are thinking of hurting or killing yourself! -- Sometimes, people with depression think of hurting or killing themselves. If you ever feel like you might hurt yourself, help is available:   In the , contact the [employer] Suicide & Crisis Lifeline:   To speak to someone, call or text [employer].   To talk to someone online, go to www.CUPS.org/chat.   Call your doctor or nurse, and tell them that it is an emergency.   Call for an ambulance (in the [place] and [place], call 9-1-1).   Go to the emergency department at your local hospital.  If you think your partner might have depression, or if you are worried that they might hurt themselves, get them help right away.  How does counseling work? -- A counselor can help you figure out:   What triggers you to want to smoke, and how to handle these situations   How to resist cravings   What you can do differently if you have tried to quit before  You can meet with a counselor in 1-on-1 sessions or as part of a group. There are other ways to get counseling, too, such as over the  "phone, through text messaging, or online.  How do medicines help you stop smoking? -- Different medicines work in different ways:   Nicotine replacement therapy - Nicotine is the main drug in cigarettes, and the reason they are addictive. These medicines reduce your body's craving for nicotine. They also help with withdrawal symptoms.  There are different forms of nicotine replacement, including skin patches, lozenges, gum, nasal sprays, and inhalers. Most can be bought without a prescription. Also, health insurance might cover some or all of the cost.  It often helps to use 2 forms of nicotine replacement. For example, you might wear a patch all the time, plus use gum or lozenges when you get a craving to smoke.   Varenicline - Varenicline (brand names: Chantix, Champix) is a prescription medicine that reduces withdrawal symptoms and cigarette cravings. Varenicline can increase the effects of alcohol in some people. It's a good idea to limit drinking while you're taking it, at least until you know how it affects you.  Even if you are not yet ready to commit to a quit date, varenicline can help reduce cravings. This can make it easier to quit when you are ready.   Bupropion - Bupropion (sample brand names: Wellbutrin, Zyban) is a prescription medicine that reduces your desire to smoke. It is also available in a generic version, which is cheaper than the brand name medicines. Doctors do not usually prescribe bupropion for people with seizures or who have had seizures in the past.  It might also be helpful to combine nicotine replacement with bupropion or varenicline. In some cases, a person might even take both bupropion and varenicline. Your doctor or nurse can help you figure out the best combination for you.  Can vaping help me quit? -- Sometimes, people wonder if vaping can help them quit smoking. Vaping is using electronic cigarettes, or \"e-cigarettes.\"  Doctors recommend using medicines and counseling to quit " smoking. These methods have been studied the most. But for people who have tried these and not been able to quit, switching to vaping might be an option. There are some things to remember:   Vaping might be less harmful than smoking regular cigarettes. But e-cigarettes still contain nicotine as well as other substances that might be harmful.   If you decide to try vaping to help you quit, it's important to switch completely from regular cigarettes to e-cigarettes. Using both will probably not be helpful, and might increase the risks of harm.   It's not clear how vaping can affect a person's health in the long term.  For these reasons, doctors recommend that if you do try vaping to help you quit smoking, you still make a plan to quit vaping eventually.  If you are interested in trying vaping to help you quit smoking, it's a good idea to talk to your doctor or nurse first.  What if I am pregnant and I smoke? -- If you are pregnant, it's really important for the health of your baby that you quit. Ask your doctor what options you have, and what is safest for your baby.  What if I have already smoked for a long time? -- The longer you have smoked, the higher your chances are of having health problems. But it is never too late to quit smoking. It helps your health even if you are older or have smoked for many years. The best thing you can do to lower your chance of having a health problem caused by smoking is to quit.  Will I gain weight if I quit? -- You might gain a few pounds. This can be frustrating for some people. But it's important to remember that you are improving your health by quitting smoking. You can help prevent gaining a lot of weight by staying active and eating a healthy diet.  What if I am not able to quit? -- If you don't quit on your first try, or if you quit but then start smoking again, don't give up hope. Lots of people have to try more than once before they are able to completely quit.  It might  help to try to understand why quitting did not work. There might be something you can do differently when you try again. It can help to figure out which situations make you want to smoke, so you can avoid them.  What else can I do to improve my chances of quitting? -- You can:   Get regular exercise. Any type of physical activity, even gentle forms of movement, is good for your health. Physical activity can also help reduce stress.   Stay away from people who smoke and places that make you want to smoke. If people close to you smoke, ask them to quit with you or avoid smoking around you.   Carry gum, hard candy, or something to put in your mouth. If you get a craving for a cigarette, try 1 of these instead.   Don't give up, even if you start smoking again. It takes most people a few tries before they succeed.  All topics are updated as new evidence becomes available and our peer review process is complete.  This topic retrieved from [employer] on: Mar 14, 2024.  Topic 93062 Version 33.0  Release: 32.2.4 - C32.72  © 2024 UpToDate, Inc. and/or its affiliates. All rights reserved.  Consumer Information Use and Disclaimer   Disclaimer: This generalized information is a limited summary of diagnosis, treatment, and/or medication information. It is not meant to be comprehensive and should be used as a tool to help the user understand and/or assess potential diagnostic and treatment options. It does NOT include all information about conditions, treatments, medications, side effects, or risks that may apply to a specific patient. It is not intended to be medical advice or a substitute for the medical advice, diagnosis, or treatment of a health care provider based on the health care provider's examination and assessment of a patient's specific and unique circumstances. Patients must speak with a health care provider for complete information about their health, medical questions, and treatment options, including any risks or  benefits regarding use of medications. This information does not endorse any treatments or medications as safe, effective, or approved for treating a specific patient. UpToDate, Inc. and its affiliates disclaim any warranty or liability relating to this information or the use thereof.The use of this information is governed by the Terms of Use, available at https://www.[employer]her[employer] Biosciencesuwer.com/en/know/clinical-effectiveness-terms. 2024© UpToDate, Inc. and its affiliates and/or licensors. All rights reserved.  Copyright   © 2024 UpToDate, Inc. and/or its affiliates. All rights reserved.    Medicare Preventive Visit Patient Instructions  Thank you for completing your Welcome to Medicare Visit or Medicare Annual Wellness Visit today. Your next wellness visit will be due in one year (3/21/2026).  The screening/preventive services that you may require over the next 5-10 years are detailed below. Some tests may not apply to you based off risk factors and/or age. Screening tests ordered at today's visit but not completed yet may show as past due. Also, please note that scanned in results may not display below.  Preventive Screenings:  Service Recommendations Previous Testing/Comments   Colorectal Cancer Screening  * Colonoscopy    * Fecal Occult Blood Test (FOBT)/Fecal Immunochemical Test (FIT)  * Fecal DNA/Cologuard Test  * Flexible Sigmoidoscopy Age: 45-75 years old   Colonoscopy: every 10 years (may be performed more frequently if at higher risk)  OR  FOBT/FIT: every 1 year  OR  Cologuard: every 3 years  OR  Sigmoidoscopy: every 5 years  Screening may be recommended earlier than age 45 if at higher risk for colorectal cancer. Also, an individualized decision between you and your healthcare provider will decide whether screening between the ages of 76-85 would be appropriate. Colonoscopy: 09/28/2021  FOBT/FIT: Not on file  Cologuard: Not on file  Sigmoidoscopy: Not on file    Screening Current     Breast Cancer Screening Age:  40+ years old  Frequency: every 1-2 years  Not required if history of left and right mastectomy Mammogram: 02/22/2024    Screening Current   Cervical Cancer Screening Between the ages of 21-29, pap smear recommended once every 3 years.   Between the ages of 30-65, can perform pap smear with HPV co-testing every 5 years.   Recommendations may differ for women with a history of total hysterectomy, cervical cancer, or abnormal pap smears in past. Pap Smear: 11/18/2021    Screening Not Indicated   Hepatitis C Screening Once for adults born between 1945 and 1965  More frequently in patients at high risk for Hepatitis C Hep C Antibody: 10/22/2019    Screening Current   Diabetes Screening 1-2 times per year if you're at risk for diabetes or have pre-diabetes Fasting glucose: No results in last 5 years (No results in last 5 years)  A1C: No results in last 5 years (No results in last 5 years)  Screening Current   Cholesterol Screening Once every 5 years if you don't have a lipid disorder. May order more often based on risk factors. Lipid panel: 02/20/2025    Screening Not Indicated  History Lipid Disorder     Other Preventive Screenings Covered by Medicare:  Abdominal Aortic Aneurysm (AAA) Screening: covered once if your at risk. You're considered to be at risk if you have a family history of AAA.  Lung Cancer Screening: covers low dose CT scan once per year if you meet all of the following conditions: (1) Age 55-77; (2) No signs or symptoms of lung cancer; (3) Current smoker or have quit smoking within the last 15 years; (4) You have a tobacco smoking history of at least 20 pack years (packs per day multiplied by number of years you smoked); (5) You get a written order from a healthcare provider.  Glaucoma Screening: covered annually if you're considered high risk: (1) You have diabetes OR (2) Family history of glaucoma OR (3)  aged 50 and older OR (4)  American aged 65 and older  Osteoporosis  Screening: covered every 2 years if you meet one of the following conditions: (1) You're estrogen deficient and at risk for osteoporosis based off medical history and other findings; (2) Have a vertebral abnormality; (3) On glucocorticoid therapy for more than 3 months; (4) Have primary hyperparathyroidism; (5) On osteoporosis medications and need to assess response to drug therapy.   Last bone density test (DXA Scan): 01/07/2021.  HIV Screening: covered annually if you're between the age of 15-65. Also covered annually if you are younger than 15 and older than 65 with risk factors for HIV infection. For pregnant patients, it is covered up to 3 times per pregnancy.    Immunizations:  Immunization Recommendations   Influenza Vaccine Annual influenza vaccination during flu season is recommended for all persons aged >= 6 months who do not have contraindications   Pneumococcal Vaccine   * Pneumococcal conjugate vaccine = PCV13 (Prevnar 13), PCV15 (Vaxneuvance), PCV20 (Prevnar 20)  * Pneumococcal polysaccharide vaccine = PPSV23 (Pneumovax) Adults 19-65 yo with certain risk factors or if 65+ yo  If never received any pneumonia vaccine: recommend Prevnar 20 (PCV20)  Give PCV20 if previously received 1 dose of PCV13 or PPSV23   Hepatitis B Vaccine 3 dose series if at intermediate or high risk (ex: diabetes, end stage renal disease, liver disease)   Respiratory syncytial virus (RSV) Vaccine - COVERED BY MEDICARE PART D  * RSVPreF3 (Arexvy) CDC recommends that adults 60 years of age and older may receive a single dose of RSV vaccine using shared clinical decision-making (SCDM)   Tetanus (Td) Vaccine - COST NOT COVERED BY MEDICARE PART B Following completion of primary series, a booster dose should be given every 10 years to maintain immunity against tetanus. Td may also be given as tetanus wound prophylaxis.   Tdap Vaccine - COST NOT COVERED BY MEDICARE PART B Recommended at least once for all adults. For pregnant patients,  recommended with each pregnancy.   Shingles Vaccine (Shingrix) - COST NOT COVERED BY MEDICARE PART B  2 shot series recommended in those 19 years and older who have or will have weakened immune systems or those 50 years and older     Health Maintenance Due:      Topic Date Due    Breast Cancer Screening: Mammogram  02/22/2025    DXA SCAN  01/07/2026    Colorectal Cancer Screening  09/27/2026    Hepatitis C Screening  Completed     Immunizations Due:      Topic Date Due    Pneumococcal Vaccine: 65+ Years (1 of 2 - PCV) Never done    Influenza Vaccine (1) Never done    COVID-19 Vaccine (1 - 2024-25 season) Never done     Advance Directives   What are advance directives?  Advance directives are legal documents that state your wishes and plans for medical care. These plans are made ahead of time in case you lose your ability to make decisions for yourself. Advance directives can apply to any medical decision, such as the treatments you want, and if you want to donate organs.   What are the types of advance directives?  There are many types of advance directives, and each state has rules about how to use them. You may choose a combination of any of the following:  Living will:  This is a written record of the treatment you want. You can also choose which treatments you do not want, which to limit, and which to stop at a certain time. This includes surgery, medicine, IV fluid, and tube feedings.   Durable power of  for healthcare (DPAHC):  This is a written record that states who you want to make healthcare choices for you when you are unable to make them for yourself. This person, called a proxy, is usually a family member or a friend. You may choose more than 1 proxy.  Do not resuscitate (DNR) order:  A DNR order is used in case your heart stops beating or you stop breathing. It is a request not to have certain forms of treatment, such as CPR. A DNR order may be included in other types of advance  directives.  Medical directive:  This covers the care that you want if you are in a coma, near death, or unable to make decisions for yourself. You can list the treatments you want for each condition. Treatment may include pain medicine, surgery, blood transfusions, dialysis, IV or tube feedings, and a ventilator (breathing machine).  Values history:  This document has questions about your views, beliefs, and how you feel and think about life. This information can help others choose the care that you would choose.  Why are advance directives important?  An advance directive helps you control your care. Although spoken wishes may be used, it is better to have your wishes written down. Spoken wishes can be misunderstood, or not followed. Treatments may be given even if you do not want them. An advance directive may make it easier for your family to make difficult choices about your care.   Cigarette Smoking and Your Health   Risks to your health if you smoke:  Nicotine and other chemicals found in tobacco damage every cell in your body. Even if you are a light smoker, you have an increased risk for cancer, heart disease, and lung disease. If you are pregnant or have diabetes, smoking increases your risk for complications.   Benefits to your health if you stop smoking:   You decrease respiratory symptoms such as coughing, wheezing, and shortness of breath.   You reduce your risk for cancers of the lung, mouth, throat, kidney, bladder, pancreas, stomach, and cervix. If you already have cancer, you increase the benefits of chemotherapy. You also reduce your risk for cancer returning or a second cancer from developing.   You reduce your risk for heart disease, blood clots, heart attack, and stroke.   You reduce your risk for lung infections, and diseases such as pneumonia, asthma, chronic bronchitis, and emphysema.  Your circulation improves. More oxygen can be delivered to your body. If you have diabetes, you lower your  risk for complications, such as kidney, artery, and eye diseases. You also lower your risk for nerve damage. Nerve damage can lead to amputations, poor vision, and blindness.  You improve your body's ability to heal and to fight infections.  For more information and support to stop smoking:   Smokefree.gov  Phone: 7- 432 - 009-2143  Web Address: www.[employer]     © Copyright [employer] 2018 Information is for End User's use only and may not be sold, redistributed or otherwise used for commercial purposes. All illustrations and images included in CareNotes® are the copyrighted property of [employer].D.A[employer]M., Inc. or [employer]

## 2025-03-21 DIAGNOSIS — E78.2 MIXED HYPERLIPIDEMIA: Primary | ICD-10-CM

## 2025-03-23 PROBLEM — F17.210 CIGARETTE NICOTINE DEPENDENCE WITHOUT COMPLICATION: Status: ACTIVE | Noted: 2025-03-23

## 2025-03-23 PROBLEM — Z12.31 ENCOUNTER FOR SCREENING MAMMOGRAM FOR BREAST CANCER: Status: ACTIVE | Noted: 2025-03-23

## 2025-03-23 PROBLEM — Z00.00 MEDICARE ANNUAL WELLNESS VISIT, SUBSEQUENT: Status: ACTIVE | Noted: 2025-03-23

## 2025-03-24 NOTE — ASSESSMENT & PLAN NOTE
Schedule ct lung cancer screen  Discussed smoking cessation  Orders:    CT lung screening program; Future

## 2025-03-27 ENCOUNTER — HOSPITAL ENCOUNTER (OUTPATIENT)
Dept: CT IMAGING | Facility: HOSPITAL | Age: 65
Discharge: HOME | End: 2025-03-27
Payer: COMMERCIAL

## 2025-03-27 ENCOUNTER — EVALUATION (OUTPATIENT)
Dept: PHYSICAL THERAPY | Facility: CLINIC | Age: 65
End: 2025-03-27
Payer: MEDICARE

## 2025-03-27 DIAGNOSIS — E78.2 MIXED HYPERLIPIDEMIA: ICD-10-CM

## 2025-03-27 DIAGNOSIS — M79.602 LEFT ARM PAIN: ICD-10-CM

## 2025-03-27 PROCEDURE — 97112 NEUROMUSCULAR REEDUCATION: CPT

## 2025-03-27 PROCEDURE — 75571 CT HRT W/O DYE W/CA TEST: CPT

## 2025-03-27 PROCEDURE — 97161 PT EVAL LOW COMPLEX 20 MIN: CPT

## 2025-03-27 NOTE — PROGRESS NOTES
PT Evaluation     Today's date: 3/27/2025  Patient name: Ursula Gold  : 1960  MRN: [id number]  Referring provider: Luci Gilbert DO  Dx:   Encounter Diagnosis     ICD-10-CM    1. Left arm pain  M79.602 Ambulatory Referral to Physical Therapy                     Assessment  Impairments: abnormal muscle firing, abnormal or restricted ROM, activity intolerance, impaired physical strength, lacks appropriate home exercise program, pain with function, scapular dyskinesis, poor posture  and poor body mechanics    Assessment details: Ursula Gold is a 65 y.o. female who presents to hospital-based outpatient PT with left arm pain. Patient also presented with limitations in cervical AROM and (+) median nerve tension test on the left. Patient presents with the following impairments: limited cervical ROM, UT/LS tissue tension, limited left median nerve mobility, limited strength, and postural dysfunction. Due to these impairments, patient has difficulty performing the following: reaching forward, reaching to the side, lifting overhead, carrying, applying makeup, and performing self-care ADLs. Patient has been educated in home exercise program and plan of care. Patient would benefit from skilled physical therapy services to address the above functional limitations and progress towards prior level of function and independence with home exercise program.  Understanding of Dx/Px/POC: good     Prognosis: good    Goals  Short Term Goals [3 weeks; target date: 5/15/25]  1. Patient will be independent with initial HEP.  4. Patient will be able to complete all below shoulder-height ADLs without increase in symptoms.     Long Term Goals [6 weeks; target date: 25]  1. Patient will be independent with comprehensive HEP.    2. Patient will demonstrate an increase in left shoulder AROM to WNL without pain, in order to perform self-care ADLs without pain.   3. Patient will demonstrate an increase in left  "shoulder strength to at least 4+/5 on MMT in order to promote pain-free carrying/lifting.  4. Patient will be able to place a 5 lb. weight on a shelf above shoulder height with proper scapulohumeral mechanics.   5. Patient will abolish left arm symptoms at rest.     Plan  Patient would benefit from: skilled physical therapy  Planned modality interventions: cryotherapy, electrical stimulation/Russian stimulation, TENS, ultrasound, high voltage pulsed current: pain management, thermotherapy: hydrocollator packs, unattended electrical stimulation and high voltage pulsed current: spasm management    Planned therapy interventions: flexibility, functional ROM exercises, graded exercise, home exercise program, joint mobilization, manual therapy, neuromuscular re-education, patient education, strengthening, stretching, therapeutic exercise, therapeutic activities, activity modification, postural training, body mechanics training, graded activity, self care, muscle pump exercises, abdominal trunk stabilization, ADL retraining, ADL training, IADL retraining, breathing training, behavior modification, therapeutic training, transfer training and Loera taping    Frequency: 2x week  Plan of Care beginning date: 3/27/2025  Plan of Care expiration date: 6/27/2025  Treatment plan discussed with: patient      Subjective Evaluation    History of Present Illness  Date of onset: 2/9/2025  Mechanism of injury: Pt reports that she slipped on ice and fell directly onto her left shoulder on her porch as she was entering her home on [address] [facility] Sunday. Pt states she then saw family medicine the next day and has had an x-ray, showing no fracture. Pt states that due to left lateral upper arm pain, she has limited strength with reaching overhead, reaching to the side, and performing self-care ADLs. Pt states that at times, her pain referred to her left chest and back of left arm. Pt describes what feel like \"nerve symptoms\" down to the " wrist. Pt states that she also has aches at night. Pt states she has difficulty putting on makeup and feels she occasionally has poor control of her left hand. Pt was referred to PT.   Patient Goals  Patient goal: To feel like it did before  Pain  Current pain rating: 3  At best pain ratin  At worst pain ratin  Location: Left upper arm  Quality: radiating  Alleviating factors: icy-hot, aspirin.  Exacerbated by: putting seatbelt on, reaching up and behind, putting on makeup, impingement position.  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[employer])  Hand dominance: left  Exercise history: Walking at work      Diagnostic Tests  X-ray: normal  Treatments  No previous or current treatments      Objective     Active Range of Motion   Cervical/Thoracic Spine       Cervical    Flexion: 40 degrees   Extension: 50 degrees      Left lateral flexion: 20 (L UT) degrees     with pain  Right lateral flexion: 35 degrees      Left rotation: 60 degrees  Right rotation: 75 degrees     Left Shoulder   Flexion: 170 degrees with pain  Abduction: 180 degrees with pain  External rotation 0°: 80 degrees   Internal rotation 0°: WFL    Right Shoulder   Flexion: 170 degrees   Abduction: 180 degrees   External rotation 0°: 85 degrees   Internal rotation 0°: WFL    Additional Active Range of Motion Details  Functional ER = T4 bilaterally  Functional IR = T6 RUE; T9 LUE    Strength/Myotome Testing     Left Shoulder     Planes of Motion   Flexion: 4   Abduction: 4   External rotation at 0°: 3+   Internal rotation at 0°: 4+     Right Shoulder     Planes of Motion   Flexion: 5   Abduction: 5   External rotation at 0°: 4+   Internal rotation at 0°: 5             Daily Treatment Diary     Precautions: Standard    POC Expires Reeval for Medicare to be completed  Unit Limit Auth Expiration Date PT/OT/STVisit Limit   25 By visit 10 N/A N/A N/A                        Auth Status DATE 3/27        NA Visit # 1         Remaining         MANUAL THERAPY         STM/MFR         Manual SOR w/ cervical distraction         Joint mobs cervical                                     THERAPEUTIC EXERCISE HEP         Median nerve glide x 10x                                                                                                                                                     NEUROMUSCULAR REEDUCATION           Cervical retraction x 5x3s        Scap retraction  x 5x3s                                                                                                                                THERAPEUTIC ACTIVITY                                                  GAIT TRAINING                                                  MODALITIES

## 2025-04-03 ENCOUNTER — APPOINTMENT (OUTPATIENT)
Dept: PHYSICAL THERAPY | Facility: CLINIC | Age: 65
End: 2025-04-03
Payer: MEDICARE

## 2025-04-07 ENCOUNTER — APPOINTMENT (OUTPATIENT)
Dept: PHYSICAL THERAPY | Facility: CLINIC | Age: 65
End: 2025-04-07
Payer: MEDICARE

## 2025-04-11 ENCOUNTER — RESULTS FOLLOW-UP (OUTPATIENT)
Dept: FAMILY MEDICINE CLINIC | Facility: CLINIC | Age: 65
End: 2025-04-11

## 2025-04-14 NOTE — TELEPHONE ENCOUNTER
----- Message from Minnie Aburto DO sent at 4/11/2025 11:10 PM EDT -----  Your coronary calcium score is mildly elevated, divided at a low level among 3 arteries.

## 2025-04-14 NOTE — TELEPHONE ENCOUNTER
Your coronary calcium score is mildly elevated, divided at a low level among 3 arteries.     Last read by Yolanda Gold at 11:14PM on 4/11/2025.

## 2025-04-17 ENCOUNTER — OFFICE VISIT (OUTPATIENT)
Dept: PHYSICAL THERAPY | Facility: CLINIC | Age: 65
End: 2025-04-17
Payer: MEDICARE

## 2025-04-17 DIAGNOSIS — M79.602 LEFT ARM PAIN: Primary | ICD-10-CM

## 2025-04-17 PROCEDURE — 97112 NEUROMUSCULAR REEDUCATION: CPT

## 2025-04-17 PROCEDURE — 97140 MANUAL THERAPY 1/> REGIONS: CPT

## 2025-04-17 PROCEDURE — 97110 THERAPEUTIC EXERCISES: CPT

## 2025-04-17 NOTE — PROGRESS NOTES
Daily Note      Today's date: 2025  Patient name: Ursula Gold  : 1960  MRN: [id number]  Referring provider: Luci Gilbert DO  Dx:   Encounter Diagnosis     ICD-10-CM    1. Left arm pain  M79.602           Subjective: Pt reports that her left arm pain is less intense and less frequent. Pt states she still feels it when she lifts with her left arm to the side (around shoulder height).        Objective: See treatment diary below    Assessment: Pt tolerated treatment well.  Pt demonstrated good form with cervical retraction and scap retraction, but had been performing holds for longer duration. Educated pt regarding stoplight analogy for exercise/activity modification. Pt had performed median nerve glide with elbow remaining flexed, with increased shoulder extension into abduction, which resulted in increased posterior shoulder symptoms. These symptoms abolished when relaxing from this position. Introduced rows and shoulder extension, as well as B/L ER and updated HEP to  include these, as pt presents with muscle fatigue and posterior cuff weakness.     Plan: Continue per plan of care.         Daily Treatment Diary     Precautions: Standard    POC Expires Reeval for Medicare to be completed  Unit Limit Auth Expiration Date PT/OT/STVisit Limit   25 By visit 10 N/A N/A N/A                       Auth Status DATE 3/27 4/17       NA Visit # 1 2        Remaining         MANUAL THERAPY         STM/MFR  Cervical paraspinals       Manual SOR w/ cervical distraction    Performed       Joint mobs cervical                                     THERAPEUTIC EXERCISE HEP         Median nerve glide x 10x  10x LUE w/ R cervical sidebend                                                                                                                                                    NEUROMUSCULAR REEDUCATION           Cervical retraction x 5x3s 2x10 (5s) seated       Scap retraction  x 5x3s 2x10 (5s) seated        Rows   2x10 GTB       Shoulder extension   2x10 OTB       B/L ER   2x10 OTB                                                                                                  THERAPEUTIC ACTIVITY                                                    GAIT TRAINING                                                  MODALITIES                              Access Code: [email or web address]  URL: [email or web address]  Date: 04/17/2025  Prepared by: Hugo Clark    Exercises  - Seated Cervical Retraction  - 2 x daily - 7 x weekly - 2 sets - 10 reps - 5 hold  - Seated Scapular Retraction  - 2 x daily - 7 x weekly - 2 sets - 10 reps - 5 hold  - Standing Row with Anchored Resistance  - 1 x daily - 7 x weekly - 2 sets - 10 reps - 3 hold  - Shoulder Extension with Resistance  - 1 x daily - 7 x weekly - 2 sets - 10 reps - 3 hold  - Standing Shoulder External Rotation with Resistance  - 1 x daily - 7 x weekly - 2 sets - 10 reps - 3 hold  - Median Nerve Glide  - 1 x daily - 7 x weekly - 2 sets - 10 reps - 3 hold

## 2025-04-18 ENCOUNTER — APPOINTMENT (OUTPATIENT)
Dept: PHYSICAL THERAPY | Facility: CLINIC | Age: 65
End: 2025-04-18
Payer: MEDICARE

## 2025-04-22 PROBLEM — Z00.00 MEDICARE ANNUAL WELLNESS VISIT, SUBSEQUENT: Status: RESOLVED | Noted: 2025-03-23 | Resolved: 2025-04-22

## 2025-04-24 ENCOUNTER — APPOINTMENT (OUTPATIENT)
Dept: PHYSICAL THERAPY | Facility: CLINIC | Age: 65
End: 2025-04-24
Payer: MEDICARE

## 2025-05-01 ENCOUNTER — OFFICE VISIT (OUTPATIENT)
Dept: PHYSICAL THERAPY | Facility: CLINIC | Age: 65
End: 2025-05-01
Payer: MEDICARE

## 2025-05-01 DIAGNOSIS — M79.602 LEFT ARM PAIN: Primary | ICD-10-CM

## 2025-05-01 PROCEDURE — 97140 MANUAL THERAPY 1/> REGIONS: CPT

## 2025-05-01 PROCEDURE — 97112 NEUROMUSCULAR REEDUCATION: CPT

## 2025-05-01 PROCEDURE — 97110 THERAPEUTIC EXERCISES: CPT

## 2025-05-08 ENCOUNTER — APPOINTMENT (OUTPATIENT)
Dept: PHYSICAL THERAPY | Facility: CLINIC | Age: 65
End: 2025-05-08
Payer: MEDICARE

## 2025-05-15 ENCOUNTER — OFFICE VISIT (OUTPATIENT)
Dept: PHYSICAL THERAPY | Facility: CLINIC | Age: 65
End: 2025-05-15
Payer: MEDICARE

## 2025-05-15 DIAGNOSIS — M79.602 LEFT ARM PAIN: Primary | ICD-10-CM

## 2025-05-15 PROCEDURE — 97140 MANUAL THERAPY 1/> REGIONS: CPT

## 2025-05-15 PROCEDURE — 97112 NEUROMUSCULAR REEDUCATION: CPT

## 2025-05-15 PROCEDURE — 97110 THERAPEUTIC EXERCISES: CPT

## 2025-05-15 NOTE — PROGRESS NOTES
Daily Note      Today's date: 5/15/2025  Patient name: Ursula Gold  : 1960  MRN: [id number]  Referring provider: Luci Gilbert DO  Dx:   Encounter Diagnosis     ICD-10-CM    1. Left arm pain  M79.602           Subjective: Pt reports that she continues to have pain from the back of the neck on the left side, down to the back of the left shoulder. Pt states it continues to be intermittent and low in intensity. Pt states she primarily has shakiness in her left arm when reaching to the side (around 90+ degrees while demonstrating).        Objective: See treatment diary below    Assessment: Pt tolerated treatment well. Pt noted posterior left shoulder symptoms during median nerve glide with arm abducted to 90 degrees. Pt noted minimal change in symptoms with head in neutral compared to sidebending ipsilaterally and contralaterally. Pt introduced to shoulder flexion AROM and noted fatigue and posterior shoulder symptoms increasing at the end of the exercise. Pt also noted fatigue and mild pain in the posterior left scapula with sidelying shoulder ER.     Plan: Continue per plan of care.         Daily Treatment Diary     Precautions: Standard    POC Expires Reeval for Medicare to be completed  Unit Limit Auth Expiration Date PT/OT/STVisit Limit   25 By visit 10 N/A N/A N/A                       Auth Status DATE 3/27 4/17 5/1 5/15     NA Visit # 1 2 3 4      Remaining 9 8 7 6     MANUAL THERAPY         STM/MFR  Cervical paraspinals Cervical paraspinals + L shoulder  Cervical paraspinals + L shoulder     Manual SOR w/ cervical distraction    Performed JW DJA     Joint mobs cervical                                     THERAPEUTIC EXERCISE HEP         Median nerve glide x 10x  10x LUE w/ R cervical sidebend  10x LUE w/ R cervical sidebend  2x10 LUE w/ head in neutral      Shoulder flex AROM     2x10      Sidelying ER     2x10     Sidelying abd     Instructed                                                                                                                    NEUROMUSCULAR REEDUCATION           Cervical retraction x 5x3s 2x10 (5s) seated 2x10 (5s) seated 2x10 (5s) seated     Scap retraction  x 5x3s 2x10 (5s) seated 2x10 (5s) seated 2x10 (5s) seated     Rows   2x10 GTB 2x10 GTB 2x10 GTB     Shoulder extension   2x10 OTB 2x10 GTB 2x10 OTB      B/L ER   2x10 OTB  2x10                                                                                                 THERAPEUTIC ACTIVITY                                                    GAIT TRAINING                                                  MODALITIES                              Access Code: [email or web address]  URL: [email or web address]  Date: 04/17/2025  Prepared by: Hugo Clark    Exercises  - Seated Cervical Retraction  - 2 x daily - 7 x weekly - 2 sets - 10 reps - 5 hold  - Seated Scapular Retraction  - 2 x daily - 7 x weekly - 2 sets - 10 reps - 5 hold  - Standing Row with Anchored Resistance  - 1 x daily - 7 x weekly - 2 sets - 10 reps - 3 hold  - Shoulder Extension with Resistance  - 1 x daily - 7 x weekly - 2 sets - 10 reps - 3 hold  - Standing Shoulder External Rotation with Resistance  - 1 x daily - 7 x weekly - 2 sets - 10 reps - 3 hold  - Median Nerve Glide  - 1 x daily - 7 x weekly - 2 sets - 10 reps - 3 hold

## 2025-05-22 ENCOUNTER — OFFICE VISIT (OUTPATIENT)
Dept: PHYSICAL THERAPY | Facility: CLINIC | Age: 65
End: 2025-05-22
Payer: MEDICARE

## 2025-05-22 DIAGNOSIS — M79.602 LEFT ARM PAIN: Primary | ICD-10-CM

## 2025-05-22 PROCEDURE — 97110 THERAPEUTIC EXERCISES: CPT

## 2025-05-22 PROCEDURE — 97112 NEUROMUSCULAR REEDUCATION: CPT

## 2025-05-22 PROCEDURE — 97140 MANUAL THERAPY 1/> REGIONS: CPT

## 2025-05-22 NOTE — PROGRESS NOTES
Daily Note      Today's date: 2025  Patient name: Ursula Gold  : 1960  MRN: [id number]  Referring provider: Luci Gilbert DO  Dx:   Encounter Diagnosis     ICD-10-CM    1. Left arm pain  M79.602           Subjective: Pt reports that this past Tuesday, she had increased pain into the left arm, as far down as the wrist. Pt states that at times, symptoms also switched to the right arm. Pt states that she had her granddaughter with her, but states activity level was about the same. Pt asks about the next steps, considering her left arm pain has improved, but is still persistent.        Objective: See treatment diary below    Assessment: Pt tolerated treatment well. Pt noted onset of left periscapular symptoms with scap retraction and shoulder extensions, but these symptoms abolished immediately upon completion of each exercise. Pt was educated that the next step would be to see a spine/pain specialist and pt was emailed information about [facility]. Pt introduced to cervical SNAGs in extension and tolerated well. Pt also introduced to seated thoracic extension, but noted increased left upper arm pain with supporting her neck with her hands.     Plan: Continue per plan of care.         Daily Treatment Diary     Precautions: Standard    POC Expires Reeval for Medicare to be completed  Unit Limit Auth Expiration Date PT/OT/STVisit Limit   25 By visit 10 N/A N/A N/A                       Auth Status DATE 3/27 4/17 5/1 5/15 5/22    NA Visit # 1 2 3 4 5     Remaining 9 8 7 6 5    MANUAL THERAPY         STM/MFR  Cervical paraspinals Cervical paraspinals + L shoulder  Cervical paraspinals + L shoulder Cervical paraspinals    Manual SOR w/ cervical distraction    Performed JW DJA DJA    Joint mobs cervical      Median nerve glides 30x LUE                               THERAPEUTIC EXERCISE HEP         Median nerve glide x 10x  10x LUE w/ R cervical sidebend  10x LUE w/ R cervical  sidebend  2x10 LUE w/ head in neutral  2x10     Shoulder flex AROM     2x10      Sidelying ER     2x10     Sidelying abd     Instructed     Cervical SNAG ext       2x10 (2s)     Cervical SNAG L rotation          Seated thoracic extension      2x10 (5s) hands supporting neck                                                                                              NEUROMUSCULAR REEDUCATION           Cervical retraction x 5x3s 2x10 (5s) seated 2x10 (5s) seated 2x10 (5s) seated 2x10 (5s) seated    Scap retraction  x 5x3s 2x10 (5s) seated 2x10 (5s) seated 2x10 (5s) seated 2x10 (5s)     Rows   2x10 GTB 2x10 GTB 2x10 GTB 2x10 GTB    Shoulder extension   2x10 OTB 2x10 GTB 2x10 OTB  2x10 OTB     B/L ER   2x10 OTB  2x10                                                                                                 THERAPEUTIC ACTIVITY                                                    GAIT TRAINING                                                  MODALITIES                              Access Code: [email or web address]  URL: [email or web address]  Date: 04/17/2025  Prepared by: Hugo Clark    Exercises  - Seated Cervical Retraction  - 2 x daily - 7 x weekly - 2 sets - 10 reps - 5 hold  - Seated Scapular Retraction  - 2 x daily - 7 x weekly - 2 sets - 10 reps - 5 hold  - Standing Row with Anchored Resistance  - 1 x daily - 7 x weekly - 2 sets - 10 reps - 3 hold  - Shoulder Extension with Resistance  - 1 x daily - 7 x weekly - 2 sets - 10 reps - 3 hold  - Standing Shoulder External Rotation with Resistance  - 1 x daily - 7 x weekly - 2 sets - 10 reps - 3 hold  - Median Nerve Glide  - 1 x daily - 7 x weekly - 2 sets - 10 reps - 3 hold

## 2025-05-29 ENCOUNTER — OFFICE VISIT (OUTPATIENT)
Dept: PHYSICAL THERAPY | Facility: CLINIC | Age: 65
End: 2025-05-29
Payer: MEDICARE

## 2025-05-29 ENCOUNTER — APPOINTMENT (OUTPATIENT)
Dept: PHYSICAL THERAPY | Facility: CLINIC | Age: 65
End: 2025-05-29
Payer: MEDICARE

## 2025-05-29 DIAGNOSIS — M79.602 LEFT ARM PAIN: Primary | ICD-10-CM

## 2025-05-29 PROCEDURE — 97110 THERAPEUTIC EXERCISES: CPT

## 2025-05-29 PROCEDURE — 97112 NEUROMUSCULAR REEDUCATION: CPT

## 2025-05-29 PROCEDURE — 97140 MANUAL THERAPY 1/> REGIONS: CPT

## 2025-05-29 NOTE — PROGRESS NOTES
Daily Note      Today's date: 2025  Patient name: Ursula Gold  : 1960  MRN: [id number]  Referring provider: Luci Gilbert DO  Dx:   Encounter Diagnosis     ICD-10-CM    1. Left arm pain  M79.602           Subjective: Pt reports that her left arm has been feeling better overall, but still feels it when she moves her arm. Pt states that yesterday was a good day overall. Pt states that despite working yesterday and the day before and doing yard work when the weather permits, her arm has felt good.        Objective: See treatment diary below    Assessment: Pt tolerated treatment well. Pt demonstrates improved ROM with median nerve glide. Pt noted muscle trembling during shoulder flexion AROM on the left, but not on the right when comparing. Pt requested updated HEP to include new towel exercises and sidelying ER.      Plan: Continue per plan of care.         Daily Treatment Diary     Precautions: Standard    POC Expires Reeval for Medicare to be completed  Unit Limit Auth Expiration Date PT/OT/STVisit Limit   25 By visit 10 N/A N/A N/A                       Auth Status DATE 3/27 4/17 5/1 5/15 5/22 5/29   NA Visit # 1 2 3 4 5 6    Remaining 9 8 7 6 5 4   MANUAL THERAPY         STM/MFR  Cervical paraspinals Cervical paraspinals + L shoulder  Cervical paraspinals + L shoulder Cervical paraspinals Cervical paraspinals   Manual SOR w/ cervical distraction    Performed JW DJA DJA DJA   Joint mobs cervical      Median nerve glides 30x LUE                               THERAPEUTIC EXERCISE HEP         Median nerve glide x 10x  10x LUE w/ R cervical sidebend  10x LUE w/ R cervical sidebend  2x10 LUE w/ head in neutral  2x10  2x10    Shoulder flex AROM     2x10   2x10   Sidelying ER     2x10  2x10    Sidelying abd     Instructed     Cervical SNAG ext       2x10 (2s)  2x10 (2s)    Cervical SNAG L rotation       10x3s    Seated thoracic extension      2x10 (5s) hands supporting neck 2x10 (5s) hands  supporting neck                                                                                             NEUROMUSCULAR REEDUCATION           Cervical retraction x 5x3s 2x10 (5s) seated 2x10 (5s) seated 2x10 (5s) seated 2x10 (5s) seated 2x10 (5s) seated   Scap retraction  x 5x3s 2x10 (5s) seated 2x10 (5s) seated 2x10 (5s) seated 2x10 (5s)  2x10 (5s)    Rows   2x10 GTB 2x10 GTB 2x10 GTB 2x10 GTB 2x10 GTB   Shoulder extension   2x10 OTB 2x10 GTB 2x10 OTB  2x10 OTB  2x10 OTB    B/L ER   2x10 OTB  2x10                                                                                                 THERAPEUTIC ACTIVITY                                                    GAIT TRAINING                                                  MODALITIES                              Access Code: [email or web address]  URL: [email or web address]  Date: 05/29/2025  Prepared by: Hugo Clark    Exercises  - Seated Cervical Retraction  - 2 x daily - 7 x weekly - 2 sets - 10 reps - 5 hold  - Seated Scapular Retraction  - 2 x daily - 7 x weekly - 2 sets - 10 reps - 5 hold  - Standing Row with Anchored Resistance  - 1 x daily - 7 x weekly - 2 sets - 10 reps - 3 hold  - Shoulder Extension with Resistance  - 1 x daily - 7 x weekly - 2 sets - 10 reps - 3 hold  - Standing Shoulder External Rotation with Resistance  - 1 x daily - 7 x weekly - 2 sets - 10 reps - 3 hold  - Median Nerve Glide  - 1 x daily - 7 x weekly - 2 sets - 10 reps - 3 hold  - Sidelying Shoulder External Rotation  - 1 x daily - 7 x weekly - 2 sets - 10 reps - 3 hold  - Mid-Lower Cervical Extension SNAG with Strap  - 1 x daily - 7 x weekly - 2 sets - 10 reps - 5 hold  - Upper Cervical Rotation SNAG with Strap  - 1 x daily - 7 x weekly - 1 sets - 10 reps - 3 hold

## 2025-06-06 ENCOUNTER — APPOINTMENT (OUTPATIENT)
Dept: PHYSICAL THERAPY | Facility: CLINIC | Age: 65
End: 2025-06-06
Payer: MEDICARE

## 2025-06-12 ENCOUNTER — APPOINTMENT (OUTPATIENT)
Dept: PHYSICAL THERAPY | Facility: CLINIC | Age: 65
End: 2025-06-12
Payer: MEDICARE

## 2025-06-13 ENCOUNTER — APPOINTMENT (OUTPATIENT)
Dept: PHYSICAL THERAPY | Facility: CLINIC | Age: 65
End: 2025-06-13
Payer: MEDICARE

## 2025-06-19 ENCOUNTER — APPOINTMENT (OUTPATIENT)
Dept: PHYSICAL THERAPY | Facility: CLINIC | Age: 65
End: 2025-06-19
Payer: MEDICARE

## 2025-06-27 ENCOUNTER — OFFICE VISIT (OUTPATIENT)
Dept: PHYSICAL THERAPY | Facility: CLINIC | Age: 65
End: 2025-06-27
Payer: MEDICARE

## 2025-06-27 DIAGNOSIS — M79.602 LEFT ARM PAIN: Primary | ICD-10-CM

## 2025-06-27 PROCEDURE — 97112 NEUROMUSCULAR REEDUCATION: CPT

## 2025-06-27 PROCEDURE — 97110 THERAPEUTIC EXERCISES: CPT

## 2025-06-27 NOTE — PROGRESS NOTES
PT Discharge      Today's date: 2025  Patient name: Ursula Glod  : 1960  MRN: [id number]  Referring provider: Luci Gilbert DO  Dx:   Encounter Diagnosis     ICD-10-CM    1. Left arm pain  M79.602           Subjective: Pt reports that her left arm is doing better, but is still a work in progress. Pt states that she had contemplated seeing a specialist, however her arm has gotten to the point that it is not severe enough to do so.        Objective: See treatment diary below    Assessment: Pt tolerated treatment well. Reviewed comprehensive HEP with personalized instructions as pt's previous copy got stuck behind a piece of cabinetry in the garage that pt is not able to access. Provided pt with green band resistance for rows. Pt demonstrates good knowledge of exercise program and required review of cervical SNAG rotation, which was new last visit. Pt will be discharged from PT due to achieving maximum potential in this setting at this time.     Plan: Discharge from PT.         Daily Treatment Diary     Precautions: Standard    POC Expires Reeval for Medicare to be completed  Unit Limit Auth Expiration Date PT/OT/STVisit Limit   25 By visit 10 N/A N/A N/A                       Auth Status DATE 5/15 5/22 5/29 6/27     NA Visit # 4 5 6 7      Remaining 6 5 4 3     MANUAL THERAPY         STM/MFR Cervical paraspinals + L shoulder Cervical paraspinals Cervical paraspinals      Manual SOR w/ cervical distraction DJA DJA DJA      Joint mobs cervical   Median nerve glides 30x LUE                                  THERAPEUTIC EXERCISE HEP         Median nerve glide x 2x10 LUE w/ head in neutral  2x10  2x10  2x10      Shoulder flex AROM  2x10   2x10      Sidelying ER  2x10  2x10  2x10      Sidelying abd  Instructed        Cervical SNAG ext    2x10 (2s)  2x10 (2s)  10x2s      Cervical SNAG L rotation    10x3s  10x2s      Seated thoracic extension   2x10 (5s) hands supporting neck 2x10 (5s) hands  supporting neck                                                                                                NEUROMUSCULAR REEDUCATION           Cervical retraction x 2x10 (5s) seated 2x10 (5s) seated 2x10 (5s) seated 2x10 (5s)      Scap retraction  x 2x10 (5s) seated 2x10 (5s)  2x10 (5s)  2x10 (5s)      Rows  2x10 GTB 2x10 GTB 2x10 GTB 2x10 GTB     Shoulder extension  2x10 OTB  2x10 OTB  2x10 OTB  2x10 GTB     B/L ER     2x10 OTB                                                                                               THERAPEUTIC ACTIVITY                                                    GAIT TRAINING                                                  MODALITIES                              Access Code: [email or web address]  URL: [email or web address]  Date: 05/29/2025  Prepared by: Hugo Clark    Exercises  - Seated Cervical Retraction  - 2 x daily - 7 x weekly - 2 sets - 10 reps - 5 hold  - Seated Scapular Retraction  - 2 x daily - 7 x weekly - 2 sets - 10 reps - 5 hold  - Standing Row with Anchored Resistance  - 1 x daily - 7 x weekly - 2 sets - 10 reps - 3 hold  - Shoulder Extension with Resistance  - 1 x daily - 7 x weekly - 2 sets - 10 reps - 3 hold  - Standing Shoulder External Rotation with Resistance  - 1 x daily - 7 x weekly - 2 sets - 10 reps - 3 hold  - Median Nerve Glide  - 1 x daily - 7 x weekly - 2 sets - 10 reps - 3 hold  - Sidelying Shoulder External Rotation  - 1 x daily - 7 x weekly - 2 sets - 10 reps - 3 hold  - Mid-Lower Cervical Extension SNAG with Strap  - 1 x daily - 7 x weekly - 2 sets - 10 reps - 5 hold  - Upper Cervical Rotation SNAG with Strap  - 1 x daily - 7 x weekly - 1 sets - 10 reps - 3 hold